# Patient Record
Sex: FEMALE | Race: WHITE | Employment: OTHER | URBAN - METROPOLITAN AREA
[De-identification: names, ages, dates, MRNs, and addresses within clinical notes are randomized per-mention and may not be internally consistent; named-entity substitution may affect disease eponyms.]

---

## 2017-03-31 ENCOUNTER — OFFICE VISIT (OUTPATIENT)
Dept: ORTHOPEDIC SURGERY | Age: 82
End: 2017-03-31

## 2017-03-31 VITALS — BODY MASS INDEX: 42.39 KG/M2 | HEIGHT: 67 IN | WEIGHT: 270.06 LBS

## 2017-03-31 DIAGNOSIS — M17.0 PRIMARY OSTEOARTHRITIS OF BOTH KNEES: Primary | ICD-10-CM

## 2017-03-31 PROCEDURE — 99213 OFFICE O/P EST LOW 20 MIN: CPT | Performed by: ORTHOPAEDIC SURGERY

## 2017-03-31 PROCEDURE — G8399 PT W/DXA RESULTS DOCUMENT: HCPCS | Performed by: ORTHOPAEDIC SURGERY

## 2017-03-31 PROCEDURE — 4040F PNEUMOC VAC/ADMIN/RCVD: CPT | Performed by: ORTHOPAEDIC SURGERY

## 2017-03-31 PROCEDURE — 1090F PRES/ABSN URINE INCON ASSESS: CPT | Performed by: ORTHOPAEDIC SURGERY

## 2017-03-31 PROCEDURE — G8427 DOCREV CUR MEDS BY ELIG CLIN: HCPCS | Performed by: ORTHOPAEDIC SURGERY

## 2017-03-31 PROCEDURE — 20611 DRAIN/INJ JOINT/BURSA W/US: CPT | Performed by: ORTHOPAEDIC SURGERY

## 2017-03-31 PROCEDURE — G8417 CALC BMI ABV UP PARAM F/U: HCPCS | Performed by: ORTHOPAEDIC SURGERY

## 2017-03-31 PROCEDURE — 1123F ACP DISCUSS/DSCN MKR DOCD: CPT | Performed by: ORTHOPAEDIC SURGERY

## 2017-03-31 PROCEDURE — G8484 FLU IMMUNIZE NO ADMIN: HCPCS | Performed by: ORTHOPAEDIC SURGERY

## 2017-03-31 PROCEDURE — 1036F TOBACCO NON-USER: CPT | Performed by: ORTHOPAEDIC SURGERY

## 2017-05-25 ENCOUNTER — TELEPHONE (OUTPATIENT)
Dept: FAMILY MEDICINE CLINIC | Age: 82
End: 2017-05-25

## 2017-06-21 ENCOUNTER — OFFICE VISIT (OUTPATIENT)
Dept: ORTHOPEDIC SURGERY | Age: 82
End: 2017-06-21

## 2017-06-21 VITALS
BODY MASS INDEX: 26.68 KG/M2 | SYSTOLIC BLOOD PRESSURE: 168 MMHG | HEART RATE: 72 BPM | DIASTOLIC BLOOD PRESSURE: 93 MMHG | HEIGHT: 67 IN | WEIGHT: 170 LBS

## 2017-06-21 DIAGNOSIS — M17.0 PRIMARY OSTEOARTHRITIS OF BOTH KNEES: Primary | ICD-10-CM

## 2017-06-21 PROCEDURE — 99214 OFFICE O/P EST MOD 30 MIN: CPT | Performed by: ORTHOPAEDIC SURGERY

## 2017-06-21 PROCEDURE — 1036F TOBACCO NON-USER: CPT | Performed by: ORTHOPAEDIC SURGERY

## 2017-06-21 PROCEDURE — 20611 DRAIN/INJ JOINT/BURSA W/US: CPT | Performed by: ORTHOPAEDIC SURGERY

## 2017-06-21 PROCEDURE — G8417 CALC BMI ABV UP PARAM F/U: HCPCS | Performed by: ORTHOPAEDIC SURGERY

## 2017-06-21 PROCEDURE — 4040F PNEUMOC VAC/ADMIN/RCVD: CPT | Performed by: ORTHOPAEDIC SURGERY

## 2017-06-21 PROCEDURE — 1123F ACP DISCUSS/DSCN MKR DOCD: CPT | Performed by: ORTHOPAEDIC SURGERY

## 2017-06-21 PROCEDURE — G8399 PT W/DXA RESULTS DOCUMENT: HCPCS | Performed by: ORTHOPAEDIC SURGERY

## 2017-06-21 PROCEDURE — 1090F PRES/ABSN URINE INCON ASSESS: CPT | Performed by: ORTHOPAEDIC SURGERY

## 2017-06-21 PROCEDURE — G8427 DOCREV CUR MEDS BY ELIG CLIN: HCPCS | Performed by: ORTHOPAEDIC SURGERY

## 2017-06-28 ENCOUNTER — OFFICE VISIT (OUTPATIENT)
Dept: ORTHOPEDIC SURGERY | Age: 82
End: 2017-06-28

## 2017-06-28 DIAGNOSIS — M17.0 PRIMARY OSTEOARTHRITIS OF BOTH KNEES: Primary | ICD-10-CM

## 2017-06-28 PROCEDURE — 20611 DRAIN/INJ JOINT/BURSA W/US: CPT | Performed by: PHYSICIAN ASSISTANT

## 2017-06-28 PROCEDURE — 1036F TOBACCO NON-USER: CPT | Performed by: PHYSICIAN ASSISTANT

## 2017-06-28 PROCEDURE — 99999 PR OFFICE/OUTPT VISIT,PROCEDURE ONLY: CPT | Performed by: PHYSICIAN ASSISTANT

## 2017-07-05 ENCOUNTER — OFFICE VISIT (OUTPATIENT)
Dept: ORTHOPEDIC SURGERY | Age: 82
End: 2017-07-05

## 2017-07-05 DIAGNOSIS — M17.0 OSTEOARTHRITIS OF BOTH KNEES, UNSPECIFIED OSTEOARTHRITIS TYPE: Primary | ICD-10-CM

## 2017-07-05 PROCEDURE — 20611 DRAIN/INJ JOINT/BURSA W/US: CPT | Performed by: ORTHOPAEDIC SURGERY

## 2017-10-17 DIAGNOSIS — E55.9 VITAMIN D DEFICIENCY: ICD-10-CM

## 2017-10-18 ENCOUNTER — OFFICE VISIT (OUTPATIENT)
Dept: FAMILY MEDICINE CLINIC | Age: 82
End: 2017-10-18

## 2017-10-18 VITALS
BODY MASS INDEX: 23.49 KG/M2 | OXYGEN SATURATION: 93 % | SYSTOLIC BLOOD PRESSURE: 134 MMHG | HEART RATE: 85 BPM | HEIGHT: 65 IN | WEIGHT: 141 LBS | DIASTOLIC BLOOD PRESSURE: 83 MMHG

## 2017-10-18 DIAGNOSIS — Z83.49: ICD-10-CM

## 2017-10-18 DIAGNOSIS — Z91.81 AT HIGH RISK FOR FALLS: ICD-10-CM

## 2017-10-18 DIAGNOSIS — I10 ESSENTIAL HYPERTENSION: Primary | ICD-10-CM

## 2017-10-18 DIAGNOSIS — M17.0 OSTEOARTHRITIS OF BOTH KNEES, UNSPECIFIED OSTEOARTHRITIS TYPE: ICD-10-CM

## 2017-10-18 DIAGNOSIS — J45.20 MILD INTERMITTENT ASTHMA WITHOUT COMPLICATION: ICD-10-CM

## 2017-10-18 DIAGNOSIS — Z23 NEED FOR STREPTOCOCCUS PNEUMONIAE AND INFLUENZA VACCINATION: ICD-10-CM

## 2017-10-18 DIAGNOSIS — F32.A DEPRESSION, UNSPECIFIED DEPRESSION TYPE: ICD-10-CM

## 2017-10-18 DIAGNOSIS — E55.9 VITAMIN D DEFICIENCY: ICD-10-CM

## 2017-10-18 LAB
A/G RATIO: 1.6 (ref 1.1–2.2)
ALBUMIN SERPL-MCNC: 4.1 G/DL (ref 3.4–5)
ALP BLD-CCNC: 101 U/L (ref 40–129)
ALT SERPL-CCNC: 15 U/L (ref 10–40)
ANION GAP SERPL CALCULATED.3IONS-SCNC: 13 MMOL/L (ref 3–16)
AST SERPL-CCNC: 20 U/L (ref 15–37)
BILIRUB SERPL-MCNC: 0.3 MG/DL (ref 0–1)
BUN BLDV-MCNC: 15 MG/DL (ref 7–20)
CALCIUM SERPL-MCNC: 9.9 MG/DL (ref 8.3–10.6)
CHLORIDE BLD-SCNC: 101 MMOL/L (ref 99–110)
CO2: 29 MMOL/L (ref 21–32)
CREAT SERPL-MCNC: 0.7 MG/DL (ref 0.6–1.2)
GFR AFRICAN AMERICAN: >60
GFR NON-AFRICAN AMERICAN: >60
GLOBULIN: 2.6 G/DL
GLUCOSE BLD-MCNC: 145 MG/DL (ref 70–99)
POTASSIUM SERPL-SCNC: 3.9 MMOL/L (ref 3.5–5.1)
SODIUM BLD-SCNC: 143 MMOL/L (ref 136–145)
TOTAL PROTEIN: 6.7 G/DL (ref 6.4–8.2)
VITAMIN D 25-HYDROXY: 41.4 NG/ML

## 2017-10-18 PROCEDURE — 90670 PCV13 VACCINE IM: CPT | Performed by: FAMILY MEDICINE

## 2017-10-18 PROCEDURE — 4040F PNEUMOC VAC/ADMIN/RCVD: CPT | Performed by: FAMILY MEDICINE

## 2017-10-18 PROCEDURE — G8484 FLU IMMUNIZE NO ADMIN: HCPCS | Performed by: FAMILY MEDICINE

## 2017-10-18 PROCEDURE — 1123F ACP DISCUSS/DSCN MKR DOCD: CPT | Performed by: FAMILY MEDICINE

## 2017-10-18 PROCEDURE — 1036F TOBACCO NON-USER: CPT | Performed by: FAMILY MEDICINE

## 2017-10-18 PROCEDURE — G8399 PT W/DXA RESULTS DOCUMENT: HCPCS | Performed by: FAMILY MEDICINE

## 2017-10-18 PROCEDURE — 1090F PRES/ABSN URINE INCON ASSESS: CPT | Performed by: FAMILY MEDICINE

## 2017-10-18 PROCEDURE — 99214 OFFICE O/P EST MOD 30 MIN: CPT | Performed by: FAMILY MEDICINE

## 2017-10-18 PROCEDURE — G0009 ADMIN PNEUMOCOCCAL VACCINE: HCPCS | Performed by: FAMILY MEDICINE

## 2017-10-18 PROCEDURE — G8420 CALC BMI NORM PARAMETERS: HCPCS | Performed by: FAMILY MEDICINE

## 2017-10-18 PROCEDURE — G8427 DOCREV CUR MEDS BY ELIG CLIN: HCPCS | Performed by: FAMILY MEDICINE

## 2017-10-18 RX ORDER — CALCIUM CARBONATE/VITAMIN D3 600 MG-20
TABLET ORAL
Qty: 30 CAPSULE | Refills: 1 | Status: SHIPPED | OUTPATIENT
Start: 2017-10-18 | End: 2017-10-18 | Stop reason: SDUPTHER

## 2017-10-18 RX ORDER — LISINOPRIL 40 MG/1
40 TABLET ORAL DAILY
Qty: 90 TABLET | Refills: 3 | Status: SHIPPED | OUTPATIENT
Start: 2017-10-18 | End: 2019-11-14 | Stop reason: ALTCHOICE

## 2017-10-18 RX ORDER — FLUTICASONE PROPIONATE 110 UG/1
AEROSOL, METERED RESPIRATORY (INHALATION)
Qty: 1 INHALER | Refills: 5 | Status: SHIPPED | OUTPATIENT
Start: 2017-10-18 | End: 2020-03-04

## 2017-10-18 RX ORDER — AMLODIPINE BESYLATE 10 MG/1
10 TABLET ORAL DAILY
Qty: 90 TABLET | Refills: 3 | Status: SHIPPED | OUTPATIENT
Start: 2017-10-18 | End: 2018-04-25 | Stop reason: ALTCHOICE

## 2017-10-18 ASSESSMENT — PATIENT HEALTH QUESTIONNAIRE - PHQ9
2. FEELING DOWN, DEPRESSED OR HOPELESS: 0
1. LITTLE INTEREST OR PLEASURE IN DOING THINGS: 0
SUM OF ALL RESPONSES TO PHQ9 QUESTIONS 1 & 2: 0
SUM OF ALL RESPONSES TO PHQ QUESTIONS 1-9: 0

## 2017-10-18 ASSESSMENT — ENCOUNTER SYMPTOMS: SHORTNESS OF BREATH: 0

## 2017-10-18 NOTE — PROGRESS NOTES
Chacho Mendiola is a 80 y.o. female    Chief Complaint   Patient presents with    Hypertension    Asthma    Knee Pain    Other     vitamin D deficiency       HPI:    Hypertension   This is a chronic problem. The current episode started more than 1 year ago. The problem has been gradually worsening since onset. The problem is uncontrolled. Pertinent negatives include no chest pain or shortness of breath. Risk factors for coronary artery disease include post-menopausal state. Past treatments include calcium channel blockers and ACE inhibitors. The current treatment provides moderate improvement. Mild intermittent asthma. This is a chronic condition. She has not used her albuterol at all this month. She has no current wheezing. She is compliant with her Flovent. Osteoarthritis. This is a chronic condition. Patient is scheduled her next knee Supartz injection in 2 months. The injections help moderately. Patient takes aspirin for her pain which helps. No bleeding. The Meloxicam does not help much. She would be interested in trying physical therapy again. Vitamin D deficiency. She takes her supplement daily. She would like to have her levels rechecked again    History of Vitamin B12 deficiency. Patient had a normal level in 2014 and 2016 despite not taking the supplement. ROS:    Review of Systems   Respiratory: Negative for shortness of breath. Cardiovascular: Negative for chest pain. /83   Pulse 85   Ht 5' 5\" (1.651 m)   Wt 141 lb (64 kg)   SpO2 93%   Breastfeeding? No   BMI 23.46 kg/m²     Physical Exam:    Physical Exam   Constitutional: No distress. HENT:   Head: Normocephalic. Cardiovascular: Normal rate and regular rhythm. No murmur heard. Pulmonary/Chest: Effort normal and breath sounds normal.   Neurological: She is alert. Skin: She is not diaphoretic. Psychiatric: She has a normal mood and affect.        Current Outpatient Prescriptions   Medication Sig Dispense Refill    lisinopril (PRINIVIL;ZESTRIL) 40 MG tablet Take 1 tablet by mouth daily 90 tablet 3    amLODIPine (NORVASC) 10 MG tablet Take 1 tablet by mouth daily 90 tablet 3    Cholecalciferol (CVS D3) 2000 units CAPS TAKE ONE CAPSULE BY MOUTH DAILY 90 capsule 3    fluticasone (FLOVENT HFA) 110 MCG/ACT inhaler INHALE 1 PUFF INTO THE LUNGS 2 TIMES DAILY. 1 Inhaler 5    meloxicam (MOBIC) 15 MG tablet Take 0.5 tablets by mouth daily 30 tablet 0    aspirin 325 MG EC tablet Take 325 mg by mouth daily      albuterol (PROVENTIL HFA;VENTOLIN HFA) 108 (90 BASE) MCG/ACT inhaler Inhale 2 puffs into the lungs every 6 hours as needed. 1 Inhaler 2    acetaminophen (TYLENOL) 325 MG tablet Take 650 mg by mouth every 6 hours as needed. OTC       No current facility-administered medications for this visit. Assessment:    1. Essential hypertension    2. Mild intermittent asthma without complication    3. Osteoarthritis of both knees, unspecified osteoarthritis type    4. Vitamin D deficiency    5. Need for Streptococcus pneumoniae and influenza vaccination    6. At high risk for falls    7. Family history of non-anemic vitamin B12 deficiency        Plan:    1. Essential hypertension  Stable. Continue current medications. Recheck was normal.   - Comprehensive Metabolic Panel  - lisinopril (PRINIVIL;ZESTRIL) 40 MG tablet; Take 1 tablet by mouth daily  Dispense: 90 tablet; Refill: 3  - amLODIPine (NORVASC) 10 MG tablet; Take 1 tablet by mouth daily  Dispense: 90 tablet; Refill: 3    2. Mild intermittent asthma without complication  Stable. Continue current medications. - fluticasone (FLOVENT HFA) 110 MCG/ACT inhaler; INHALE 1 PUFF INTO THE LUNGS 2 TIMES DAILY. Dispense: 1 Inhaler; Refill: 5    3. Osteoarthritis of both knees, unspecified osteoarthritis type  Continue seeing Ortho. Go to [facility]    4. Vitamin D deficiency  Recheck.    - Vitamin D 25 Hydroxy  - Cholecalciferol (CVS D3) 2000

## 2017-11-01 ENCOUNTER — OFFICE VISIT (OUTPATIENT)
Dept: ORTHOPEDIC SURGERY | Age: 82
End: 2017-11-01

## 2017-11-01 VITALS — WEIGHT: 141.09 LBS | BODY MASS INDEX: 23.51 KG/M2 | HEIGHT: 65 IN

## 2017-11-01 DIAGNOSIS — M17.0 PRIMARY OSTEOARTHRITIS OF BOTH KNEES: Primary | ICD-10-CM

## 2017-11-01 PROCEDURE — G8427 DOCREV CUR MEDS BY ELIG CLIN: HCPCS | Performed by: PHYSICIAN ASSISTANT

## 2017-11-01 PROCEDURE — 1123F ACP DISCUSS/DSCN MKR DOCD: CPT | Performed by: PHYSICIAN ASSISTANT

## 2017-11-01 PROCEDURE — G8399 PT W/DXA RESULTS DOCUMENT: HCPCS | Performed by: PHYSICIAN ASSISTANT

## 2017-11-01 PROCEDURE — G8420 CALC BMI NORM PARAMETERS: HCPCS | Performed by: PHYSICIAN ASSISTANT

## 2017-11-01 PROCEDURE — 1090F PRES/ABSN URINE INCON ASSESS: CPT | Performed by: PHYSICIAN ASSISTANT

## 2017-11-01 PROCEDURE — 1036F TOBACCO NON-USER: CPT | Performed by: PHYSICIAN ASSISTANT

## 2017-11-01 PROCEDURE — G8484 FLU IMMUNIZE NO ADMIN: HCPCS | Performed by: PHYSICIAN ASSISTANT

## 2017-11-01 PROCEDURE — 99213 OFFICE O/P EST LOW 20 MIN: CPT | Performed by: PHYSICIAN ASSISTANT

## 2017-11-01 PROCEDURE — 4040F PNEUMOC VAC/ADMIN/RCVD: CPT | Performed by: PHYSICIAN ASSISTANT

## 2017-11-01 PROCEDURE — 20611 DRAIN/INJ JOINT/BURSA W/US: CPT | Performed by: PHYSICIAN ASSISTANT

## 2017-11-01 RX ORDER — MELOXICAM 15 MG/1
7.5 TABLET ORAL DAILY
Qty: 30 TABLET | Refills: 0 | Status: SHIPPED | OUTPATIENT
Start: 2017-11-01 | End: 2018-01-19

## 2017-11-01 NOTE — PROGRESS NOTES
(6.0-15.0 MHz) linear transducer was used to localize the placement of a 22-gauge needle into the knee joint. Findings: Successful needle placement for knee injection. Final images were taken and saved for permanent record. The patient tolerated the injection well. T`he patient was instructed to call the office immediately if there is any pain, redness, warmth, fever, or chills.

## 2017-12-11 ENCOUNTER — HOSPITAL ENCOUNTER (OUTPATIENT)
Dept: PHYSICAL THERAPY | Age: 82
Discharge: HOME | End: 2017-12-31
Admitting: FAMILY MEDICINE

## 2017-12-11 NOTE — PROGRESS NOTES
be indep in HEP  Short term goal 2: pt will decrease pain 25%  Short term goal 3: pt will increase B hip strength to 4/5  Short term goal 4: pt will increase TKE by 5 degress BLE  Short term goal 5: pt will ambulate with increased rosa and improved stride length       Therapy Time   Individual Concurrent Group Co-treatment   Time In 1105         Time Out 1200         Minutes 55         Timed Code Treatment Minutes: [id number] [facility] [facility], [address] [place] [address] [address]

## 2017-12-20 ENCOUNTER — HOSPITAL ENCOUNTER (OUTPATIENT)
Dept: PHYSICAL THERAPY | Age: 82
Discharge: HOME | End: 2017-12-20
Admitting: FAMILY MEDICINE

## 2017-12-20 NOTE — FLOWSHEET NOTE
treatment well [] Patient limited by fatique  [] Patient limited by pain [] Patient limited by other medical complications  [] Other:     Goals:    Short term goals  Time Frame for Short term goals: 4 weeks  Short term goal 1: pt will be indep in HEP  Short term goal 2: pt will decrease pain 25%  Short term goal 3: pt will increase B hip strength to 4/5  Short term goal 4: pt will increase TKE by 5 degress BLE  Short term goal 5: pt will ambulate with increased rosa and improved stride length           Plan: [x] Continue per plan of care [] Alter current plan (see comments)   [] Plan of care initiated [] Hold pending MD visit [] Discharge      Plan for Next Session:  Biomechanical correction of problems as they present. Facilitate correct muscle firing patterns and activation with appropriate activities. Progress patient as tolerated.      Re-Certification Due Date:    1/11/18     Signature:  Ana Nguyen PT

## 2018-01-01 ENCOUNTER — HOSPITAL ENCOUNTER (OUTPATIENT)
Dept: OTHER | Age: 83
Discharge: HOME | End: 2018-01-31
Attending: FAMILY MEDICINE | Admitting: FAMILY MEDICINE

## 2018-01-19 PROBLEM — R77.8 ELEVATED TROPONIN: Status: ACTIVE | Noted: 2018-01-19

## 2018-01-19 PROBLEM — R79.89 ELEVATED TROPONIN: Status: ACTIVE | Noted: 2018-01-19

## 2018-01-19 PROBLEM — J20.9 ACUTE BRONCHITIS: Status: ACTIVE | Noted: 2018-01-19

## 2018-02-01 ENCOUNTER — HOSPITAL ENCOUNTER (OUTPATIENT)
Dept: OTHER | Age: 83
Discharge: HOME | End: 2018-02-28
Attending: FAMILY MEDICINE | Admitting: FAMILY MEDICINE

## 2018-02-15 ENCOUNTER — OFFICE VISIT (OUTPATIENT)
Dept: ORTHOPEDIC SURGERY | Age: 83
End: 2018-02-15

## 2018-02-15 VITALS
WEIGHT: 134.26 LBS | HEIGHT: 65 IN | BODY MASS INDEX: 22.37 KG/M2 | SYSTOLIC BLOOD PRESSURE: 121 MMHG | HEART RATE: 183 BPM | DIASTOLIC BLOOD PRESSURE: 64 MMHG

## 2018-02-15 DIAGNOSIS — M17.11 PRIMARY OSTEOARTHRITIS OF RIGHT KNEE: Primary | ICD-10-CM

## 2018-02-15 DIAGNOSIS — M25.561 PAIN IN BOTH KNEES, UNSPECIFIED CHRONICITY: ICD-10-CM

## 2018-02-15 DIAGNOSIS — M25.562 PAIN IN BOTH KNEES, UNSPECIFIED CHRONICITY: ICD-10-CM

## 2018-02-15 PROCEDURE — 1123F ACP DISCUSS/DSCN MKR DOCD: CPT | Performed by: ORTHOPAEDIC SURGERY

## 2018-02-15 PROCEDURE — G8484 FLU IMMUNIZE NO ADMIN: HCPCS | Performed by: ORTHOPAEDIC SURGERY

## 2018-02-15 PROCEDURE — 4040F PNEUMOC VAC/ADMIN/RCVD: CPT | Performed by: ORTHOPAEDIC SURGERY

## 2018-02-15 PROCEDURE — 1036F TOBACCO NON-USER: CPT | Performed by: ORTHOPAEDIC SURGERY

## 2018-02-15 PROCEDURE — G8427 DOCREV CUR MEDS BY ELIG CLIN: HCPCS | Performed by: ORTHOPAEDIC SURGERY

## 2018-02-15 PROCEDURE — G8420 CALC BMI NORM PARAMETERS: HCPCS | Performed by: ORTHOPAEDIC SURGERY

## 2018-02-15 PROCEDURE — 1090F PRES/ABSN URINE INCON ASSESS: CPT | Performed by: ORTHOPAEDIC SURGERY

## 2018-02-15 PROCEDURE — L1812 KO ELASTIC W/JOINTS PRE OTS: HCPCS | Performed by: ORTHOPAEDIC SURGERY

## 2018-02-15 PROCEDURE — G8399 PT W/DXA RESULTS DOCUMENT: HCPCS | Performed by: ORTHOPAEDIC SURGERY

## 2018-02-15 PROCEDURE — 99213 OFFICE O/P EST LOW 20 MIN: CPT | Performed by: ORTHOPAEDIC SURGERY

## 2018-02-15 RX ORDER — MELOXICAM 15 MG/1
15 TABLET ORAL DAILY
Qty: 30 TABLET | Refills: 1 | Status: SHIPPED | OUTPATIENT
Start: 2018-02-15 | End: 2018-04-12

## 2018-02-15 NOTE — PROGRESS NOTES
for Pain, Disp: , Rfl:     lisinopril (PRINIVIL;ZESTRIL) 40 MG tablet, Take 1 tablet by mouth daily (Patient taking differently: Take 20 mg by mouth daily ), Disp: 90 tablet, Rfl: 3    amLODIPine (NORVASC) 10 MG tablet, Take 1 tablet by mouth daily (Patient taking differently: Take 10 mg by mouth daily ), Disp: 90 tablet, Rfl: 3    Cholecalciferol (CVS D3) 2000 units CAPS, TAKE ONE CAPSULE BY MOUTH DAILY, Disp: 90 capsule, Rfl: 3    fluticasone (FLOVENT HFA) 110 MCG/ACT inhaler, INHALE 1 PUFF INTO THE LUNGS 2 TIMES DAILY. (Patient taking differently: 2 times daily as needed INHALE 1 PUFF INTO THE LUNGS 2 TIMES DAILY.), Disp: 1 Inhaler, Rfl: 5    aspirin 325 MG EC tablet, Take 325 mg by mouth daily, Disp: , Rfl:     acetaminophen (TYLENOL) 325 MG tablet, Take 650 mg by mouth every 6 hours as needed. OTC, Disp: , Rfl:   Allergies:  Hydrochlorothiazide  Social History:    reports that she has never smoked. She has never used smokeless tobacco. She reports that she does not drink alcohol or use drugs. Family History:   Family History   Problem Relation Age of Onset    Heart Disease Father      MI       REVIEW OF SYSTEMS:   For new problems, a full review of systems will be found scanned in the patient's chart. CONSTITUTIONAL: Denies unexplained weight loss, fevers, chills   NEUROLOGICAL: Denies unsteady gait or progressive weakness  SKIN: Denies skin changes, delayed healing, rash, itching       PHYSICAL EXAM:    Vitals: Blood pressure 121/64, pulse 183, height 5' 4.96\" (1.65 m), weight 134 lb 4.2 oz (60.9 kg), not currently breastfeeding. GENERAL EXAM:  · General Apparence: Patient is adequately groomed with no evidence of malnutrition. · Orientation: The patient is oriented to time, place and person.    · Mood & Affect:The patient's mood and affect are appropriate       Bilateral knees PHYSICAL EXAMINATION:  · Inspection:  Visible valgus deformities to each knee    · Palpation:  Tenderness to palpation diffusely along the lateral aspect of each knee      · Range of Motion: range of motion is limited from proximally 5-110° bilaterally    · Strength: no gross lower extremity weakness    · Special Tests:  I am able to correct her valgus deformity on exam.  No ligamentous laxity is noted. Negative Homans testing bilaterally. · Skin:  There are no rashes, ulcerations or lesions. · Gait & station: antalgic with a walker      · Additional Examinations:        Lower Back: Examination of the lower back does not show any tenderness, deformity or injury. Range of motion is unremarkable. There is no gross instability. There are no rashes, ulcerations or lesions. Strength and tone are normal.      Diagnostic Testing:      Views:  5   Location:  Bilateral knees. One AP view including both knees. 2 additional views right knee. 2 additional views LEFT knee. Findings:  X-rays taken today reveal severe, end-stage osteoarthritis of both knees with bone-on-bone findings of the lateral compartment    Orders     Orders Placed This Encounter   Procedures    Knee Bilateral Standard         Assessment / Treatment Plan:     1. Bilateral knee osteoarthritis, severe    I discussed with the patient the nature of osteoarthritis of the knee. We talked about treatment of arthritis and the various options that are involved with this. The patient understands that the treatments can vary from essentially doing nothing to a total joint replacement arthroplasty for arthritis. I then went on to describe the utilization of glucosamine and chondroitin sulfate as a joint nutrition product. We talked about the fact that this is essentially a joint vitamin with typically minimal side effects. We also talked about utilization of prescription over-the-counter anti-inflammatory medications as the next option. We also discussed the possibility of brace wear or orthotic wear if the patient has significant varus alignment.  We then went on to discuss the possibility of Visco supplementation with hyaluronate products. We talked about the typical course of this type of treatment and the fact that often times in the treatment for significant arthritis, this is successful less than half the time. We also talked about the corticosteroid injections and the fact that this can give a brief window of relief, but does not cure the problem; in fact, the pain often has a rebound effect in 6-10 weeks after the steroid has worn off. We also discussed arthroscopy surgery in attempts to debride the joint, but the fact that this is relatively unreliable treatment in the face of significant arthritis. It can occasionally be used, particularly if there is significant meniscus pathology. Lastly we discussed total joint replacement arthroplasty as the final and definitive step in treatment of arthritis. Patient realizes the magnitude of this type of treatment as well as having voiced a general understanding to the duration of the prosthesis. The patient voiced understanding to these continuum of treatment options. I explained to the patient that I was the only answer for her disabling arthritis as a joint replacement probably really question whether she could medically get cleared. The severe question on essentially for the primary care physician. I have personally performed and/or participated in the history, exam and medical decision making and agree with all pertinent clinical information. I have also reviewed and agree with the past medical, family and social history unless otherwise noted. This dictation was performed with a verbal recognition program (DRAGON) and it was checked for errors. It is possible that there are still dictated errors within this office note. If so, please bring any errors to my attention for an addendum. All efforts were made to ensure that this office note is accurate.           Mark Parr MD

## 2018-03-01 ENCOUNTER — HOSPITAL ENCOUNTER (OUTPATIENT)
Dept: OTHER | Age: 83
Discharge: HOME | End: 2018-03-31
Attending: FAMILY MEDICINE | Admitting: FAMILY MEDICINE

## 2018-04-02 ENCOUNTER — HOSPITAL ENCOUNTER (OUTPATIENT)
Dept: OTHER | Age: 83
Discharge: HOME | End: 2018-04-02
Attending: INTERNAL MEDICINE | Admitting: INTERNAL MEDICINE

## 2018-04-02 LAB
ANION GAP SERPL CALCULATED.3IONS-SCNC: 14 MMOL/L (ref 3–16)
BUN BLDV-MCNC: 28 MG/DL (ref 7–20)
CALCIUM SERPL-MCNC: 9.5 MG/DL (ref 8.3–10.6)
CHLORIDE BLD-SCNC: 102 MMOL/L (ref 99–110)
CO2: 28 MMOL/L (ref 21–32)
CREAT SERPL-MCNC: 0.7 MG/DL (ref 0.6–1.2)
GFR AFRICAN AMERICAN: >60
GFR NON-AFRICAN AMERICAN: >60
GLUCOSE BLD-MCNC: 84 MG/DL (ref 70–99)
POTASSIUM SERPL-SCNC: 3.8 MMOL/L (ref 3.5–5.1)
SODIUM BLD-SCNC: 144 MMOL/L (ref 136–145)

## 2018-04-10 ENCOUNTER — HOSPITAL ENCOUNTER (OUTPATIENT)
Dept: CT IMAGING | Age: 83
Discharge: HOME | End: 2018-04-10
Attending: INTERNAL MEDICINE | Admitting: INTERNAL MEDICINE

## 2018-04-10 DIAGNOSIS — K59.00 CONSTIPATION, UNSPECIFIED CONSTIPATION TYPE: ICD-10-CM

## 2018-04-10 DIAGNOSIS — K59.00 CONSTIPATION: ICD-10-CM

## 2018-04-11 PROBLEM — R77.8 ELEVATED TROPONIN: Status: RESOLVED | Noted: 2018-01-19 | Resolved: 2018-04-11

## 2018-04-11 PROBLEM — R79.89 ELEVATED TROPONIN: Status: RESOLVED | Noted: 2018-01-19 | Resolved: 2018-04-11

## 2018-04-12 ENCOUNTER — OFFICE VISIT (OUTPATIENT)
Dept: INTERNAL MEDICINE CLINIC | Age: 83
End: 2018-04-12

## 2018-04-12 VITALS
WEIGHT: 131 LBS | BODY MASS INDEX: 21.83 KG/M2 | DIASTOLIC BLOOD PRESSURE: 80 MMHG | HEIGHT: 65 IN | HEART RATE: 73 BPM | SYSTOLIC BLOOD PRESSURE: 152 MMHG | OXYGEN SATURATION: 97 % | RESPIRATION RATE: 18 BRPM

## 2018-04-12 DIAGNOSIS — N20.0 STAGHORN CALCULUS: ICD-10-CM

## 2018-04-12 DIAGNOSIS — I10 ESSENTIAL HYPERTENSION: ICD-10-CM

## 2018-04-12 DIAGNOSIS — Z00.00 ROUTINE GENERAL MEDICAL EXAMINATION AT A HEALTH CARE FACILITY: Primary | ICD-10-CM

## 2018-04-12 DIAGNOSIS — M17.0 PRIMARY OSTEOARTHRITIS OF BOTH KNEES: ICD-10-CM

## 2018-04-12 DIAGNOSIS — Z71.89 ACP (ADVANCE CARE PLANNING): ICD-10-CM

## 2018-04-12 DIAGNOSIS — M75.111 INCOMPLETE TEAR OF RIGHT ROTATOR CUFF: ICD-10-CM

## 2018-04-12 DIAGNOSIS — E04.9 GOITER: ICD-10-CM

## 2018-04-12 DIAGNOSIS — J45.20 MILD INTERMITTENT ASTHMA WITHOUT COMPLICATION: ICD-10-CM

## 2018-04-12 DIAGNOSIS — E53.8 B12 DEFICIENCY: ICD-10-CM

## 2018-04-12 DIAGNOSIS — Z23 NEED FOR PROPHYLACTIC VACCINATION AND INOCULATION AGAINST VARICELLA: ICD-10-CM

## 2018-04-12 DIAGNOSIS — H61.21 IMPACTED CERUMEN OF RIGHT EAR: ICD-10-CM

## 2018-04-12 DIAGNOSIS — E55.9 VITAMIN D DEFICIENCY: ICD-10-CM

## 2018-04-12 DIAGNOSIS — Z91.199 HISTORY OF NONADHERENCE TO MEDICAL TREATMENT: ICD-10-CM

## 2018-04-12 DIAGNOSIS — G56.02 CARPAL TUNNEL SYNDROME OF LEFT WRIST: ICD-10-CM

## 2018-04-12 PROBLEM — J20.9 ACUTE BRONCHITIS: Status: RESOLVED | Noted: 2018-01-19 | Resolved: 2018-04-12

## 2018-04-12 PROCEDURE — 1090F PRES/ABSN URINE INCON ASSESS: CPT | Performed by: INTERNAL MEDICINE

## 2018-04-12 PROCEDURE — 1123F ACP DISCUSS/DSCN MKR DOCD: CPT | Performed by: INTERNAL MEDICINE

## 2018-04-12 PROCEDURE — G8399 PT W/DXA RESULTS DOCUMENT: HCPCS | Performed by: INTERNAL MEDICINE

## 2018-04-12 PROCEDURE — G8427 DOCREV CUR MEDS BY ELIG CLIN: HCPCS | Performed by: INTERNAL MEDICINE

## 2018-04-12 PROCEDURE — 4040F PNEUMOC VAC/ADMIN/RCVD: CPT | Performed by: INTERNAL MEDICINE

## 2018-04-12 PROCEDURE — 99214 OFFICE O/P EST MOD 30 MIN: CPT | Performed by: INTERNAL MEDICINE

## 2018-04-12 PROCEDURE — G0439 PPPS, SUBSEQ VISIT: HCPCS | Performed by: INTERNAL MEDICINE

## 2018-04-12 PROCEDURE — 1036F TOBACCO NON-USER: CPT | Performed by: INTERNAL MEDICINE

## 2018-04-12 PROCEDURE — G8420 CALC BMI NORM PARAMETERS: HCPCS | Performed by: INTERNAL MEDICINE

## 2018-04-12 ASSESSMENT — ANXIETY QUESTIONNAIRES: GAD7 TOTAL SCORE: 0

## 2018-04-12 ASSESSMENT — LIFESTYLE VARIABLES: HOW OFTEN DO YOU HAVE A DRINK CONTAINING ALCOHOL: 0

## 2018-04-25 ENCOUNTER — OFFICE VISIT (OUTPATIENT)
Dept: INTERNAL MEDICINE CLINIC | Age: 83
End: 2018-04-25

## 2018-04-25 VITALS
BODY MASS INDEX: 22.88 KG/M2 | DIASTOLIC BLOOD PRESSURE: 80 MMHG | SYSTOLIC BLOOD PRESSURE: 130 MMHG | WEIGHT: 134 LBS | HEIGHT: 64 IN | RESPIRATION RATE: 16 BRPM | OXYGEN SATURATION: 99 % | HEART RATE: 66 BPM

## 2018-04-25 DIAGNOSIS — I10 ESSENTIAL HYPERTENSION: Primary | ICD-10-CM

## 2018-04-25 PROCEDURE — 1036F TOBACCO NON-USER: CPT | Performed by: INTERNAL MEDICINE

## 2018-04-25 PROCEDURE — 1090F PRES/ABSN URINE INCON ASSESS: CPT | Performed by: INTERNAL MEDICINE

## 2018-04-25 PROCEDURE — 99213 OFFICE O/P EST LOW 20 MIN: CPT | Performed by: INTERNAL MEDICINE

## 2018-04-25 PROCEDURE — G8399 PT W/DXA RESULTS DOCUMENT: HCPCS | Performed by: INTERNAL MEDICINE

## 2018-04-25 PROCEDURE — G8420 CALC BMI NORM PARAMETERS: HCPCS | Performed by: INTERNAL MEDICINE

## 2018-04-25 PROCEDURE — 1123F ACP DISCUSS/DSCN MKR DOCD: CPT | Performed by: INTERNAL MEDICINE

## 2018-04-25 PROCEDURE — G8427 DOCREV CUR MEDS BY ELIG CLIN: HCPCS | Performed by: INTERNAL MEDICINE

## 2018-04-25 PROCEDURE — 4040F PNEUMOC VAC/ADMIN/RCVD: CPT | Performed by: INTERNAL MEDICINE

## 2018-05-14 ENCOUNTER — TELEPHONE (OUTPATIENT)
Dept: INTERNAL MEDICINE CLINIC | Age: 83
End: 2018-05-14

## 2018-07-19 ENCOUNTER — TELEPHONE (OUTPATIENT)
Dept: INTERNAL MEDICINE CLINIC | Age: 83
End: 2018-07-19

## 2018-07-19 DIAGNOSIS — M17.0 PRIMARY OSTEOARTHRITIS OF BOTH KNEES: Primary | ICD-10-CM

## 2018-07-24 ENCOUNTER — TELEPHONE (OUTPATIENT)
Dept: INTERNAL MEDICINE CLINIC | Age: 83
End: 2018-07-24

## 2018-07-24 NOTE — TELEPHONE ENCOUNTER
I did fax this last week and received confirmation that it was successful so I shredded it. Please re-write so I can send it again and scan into pt's chart.

## 2018-07-24 NOTE — TELEPHONE ENCOUNTER
Patient states they did not receive her walker prescription. I do not see any confirmation notes or document scanned in for original faxing.        please fax again to     [phone]

## 2018-07-26 ENCOUNTER — TELEPHONE (OUTPATIENT)
Dept: INTERNAL MEDICINE CLINIC | Age: 83
End: 2018-07-26

## 2018-08-29 ENCOUNTER — OFFICE VISIT (OUTPATIENT)
Dept: INTERNAL MEDICINE CLINIC | Age: 83
End: 2018-08-29

## 2018-08-29 VITALS
OXYGEN SATURATION: 97 % | HEART RATE: 92 BPM | SYSTOLIC BLOOD PRESSURE: 130 MMHG | DIASTOLIC BLOOD PRESSURE: 94 MMHG | BODY MASS INDEX: 19.91 KG/M2 | WEIGHT: 116 LBS

## 2018-08-29 DIAGNOSIS — I10 ESSENTIAL HYPERTENSION: Primary | ICD-10-CM

## 2018-08-29 DIAGNOSIS — K59.04 CHRONIC IDIOPATHIC CONSTIPATION: ICD-10-CM

## 2018-08-29 DIAGNOSIS — M75.111 INCOMPLETE TEAR OF RIGHT ROTATOR CUFF: ICD-10-CM

## 2018-08-29 DIAGNOSIS — M17.0 PRIMARY OSTEOARTHRITIS OF BOTH KNEES: ICD-10-CM

## 2018-08-29 PROCEDURE — G8427 DOCREV CUR MEDS BY ELIG CLIN: HCPCS | Performed by: INTERNAL MEDICINE

## 2018-08-29 PROCEDURE — 99214 OFFICE O/P EST MOD 30 MIN: CPT | Performed by: INTERNAL MEDICINE

## 2018-08-29 PROCEDURE — 4040F PNEUMOC VAC/ADMIN/RCVD: CPT | Performed by: INTERNAL MEDICINE

## 2018-08-29 PROCEDURE — 1036F TOBACCO NON-USER: CPT | Performed by: INTERNAL MEDICINE

## 2018-08-29 PROCEDURE — 1123F ACP DISCUSS/DSCN MKR DOCD: CPT | Performed by: INTERNAL MEDICINE

## 2018-08-29 PROCEDURE — G8399 PT W/DXA RESULTS DOCUMENT: HCPCS | Performed by: INTERNAL MEDICINE

## 2018-08-29 PROCEDURE — 1090F PRES/ABSN URINE INCON ASSESS: CPT | Performed by: INTERNAL MEDICINE

## 2018-08-29 PROCEDURE — G8420 CALC BMI NORM PARAMETERS: HCPCS | Performed by: INTERNAL MEDICINE

## 2018-08-29 PROCEDURE — 1101F PT FALLS ASSESS-DOCD LE1/YR: CPT | Performed by: INTERNAL MEDICINE

## 2018-08-29 RX ORDER — AMLODIPINE BESYLATE 10 MG/1
5 TABLET ORAL DAILY
Qty: 1 TABLET | Refills: 0 | COMMUNITY
Start: 2018-08-29 | End: 2018-09-08

## 2018-08-29 RX ORDER — MELOXICAM 15 MG/1
15 TABLET ORAL DAILY
Qty: 1 TABLET | Refills: 0 | COMMUNITY
Start: 2018-08-29 | End: 2018-09-05 | Stop reason: SDUPTHER

## 2018-08-29 NOTE — PROGRESS NOTES
Severa Paschal  YOB: 1935    Date of Service:  8/29/2018    Chief Complaint:      Chief Complaint   Patient presents with    Hypertension     Patient says her BP has been \"crazy\". HPI:  Severa Paschal is a 80 y.o. She also needs a letter to excuse her from jury duty due to arthritis and difficulty getting around with her rolling walker. Hypertension:  Home blood pressure monitoring: Yes - 130/80 on Lisinopril 40 mg 1/2 qd and amlodipine 10 mg 1/2 qd.  She is adherent to a low sodium diet. Patient denies chest pain, shortness of breath, headache, lightheadedness, blurred vision, peripheral edema, palpitations, dry cough and fatigue.  Antihypertensive medication side effects: no medication side effects noted.  Use of agents associated with hypertension: none.    Right rotator cuff tear and bilateral knee OA:  Still in pain on Mobic 15 mg 1/2 qd with Tyl prn and  mg qd.   Chronic Constipation:  Continue Milk of Mag prn    Lab Results   Component Value Date    LABMICR YES 01/19/2018     Lab Results   Component Value Date     04/02/2018    K 3.8 04/02/2018     04/02/2018    CO2 28 04/02/2018    BUN 28 (H) 04/02/2018    CREATININE 0.7 04/02/2018    GLUCOSE 84 04/02/2018    CALCIUM 9.5 04/02/2018     Lab Results   Component Value Date    CHOL 150 01/20/2018    TRIG 56 01/20/2018    HDL 47 01/20/2018    LDLCALC 92 01/20/2018     Lab Results   Component Value Date    ALT 9 (L) 01/19/2018    AST 13 (L) 01/19/2018     Lab Results   Component Value Date    TSH 0.57 05/19/2015    TSH 1.18 10/06/2011    T4FREE 1.05 10/06/2011    T4FREE 1.05 10/06/2011     Lab Results   Component Value Date    WBC 4.1 01/19/2018    HGB 12.9 01/19/2018    HCT 39.5 01/19/2018    MCV 89.8 01/19/2018     01/19/2018     Lab Results   Component Value Date    INR 1.10 10/31/2016      No results found for: PSA   No results found for: [facility]     Patient Active Problem List   Diagnosis    Carpal tunnel syndrome of left wrist    Mild intermittent asthma    Essential hypertension    Goiter    Vitamin D deficiency    Staghorn calculus    Biceps rupture, proximal    Incomplete tear of right rotator cuff    History of nonadherence to medical treatment    B12 deficiency    Primary osteoarthritis of both knees       Allergies   Allergen Reactions    Hydrochlorothiazide      Constipation and severe leg cramps     Outpatient Prescriptions Marked as Taking for the 8/29/18 encounter (Office Visit) with Lara Sinha MD   Medication Sig Dispense Refill    Misc. Devices (ROLLER WALKER) MISC 1 each by Does not apply route daily 1 each 0    lisinopril (PRINIVIL;ZESTRIL) 40 MG tablet Take 1 tablet by mouth daily (Patient taking differently: Take 20 mg by mouth daily ) 90 tablet 3    Cholecalciferol (CVS D3) 2000 units CAPS TAKE ONE CAPSULE BY MOUTH DAILY 90 capsule 3    fluticasone (FLOVENT HFA) 110 MCG/ACT inhaler INHALE 1 PUFF INTO THE LUNGS 2 TIMES DAILY. (Patient taking differently: 2 times daily as needed INHALE 1 PUFF INTO THE LUNGS 2 TIMES DAILY. ) 1 Inhaler 5    aspirin 325 MG EC tablet Take 325 mg by mouth daily      acetaminophen (TYLENOL) 325 MG tablet Take 650 mg by mouth every 6 hours as needed. OTC           Review of Systems: 14 systems were negative except of what was stated on HPI    Nursing note and vitals reviewed. Vitals:    08/29/18 1337   BP: (!) 130/94   Site: Left Arm   Position: Sitting   Cuff Size: Medium Adult   Pulse: 92   SpO2: 97%   Weight: 116 lb (52.6 kg)     Wt Readings from Last 3 Encounters:   08/29/18 116 lb (52.6 kg)   04/25/18 134 lb (60.8 kg)   04/12/18 131 lb (59.4 kg)     BP Readings from Last 3 Encounters:   08/29/18 (!) 130/94   04/25/18 130/80   04/12/18 (!) 152/80     Body mass index is 19.91 kg/m². Constitutional: Patient appears well-developed and well-nourished. No distress. Head: Normocephalic and atraumatic. Neck: Normal range of motion. Neck supple.

## 2018-09-05 ENCOUNTER — OFFICE VISIT (OUTPATIENT)
Dept: ORTHOPEDIC SURGERY | Age: 83
End: 2018-09-05

## 2018-09-05 VITALS — BODY MASS INDEX: 22.88 KG/M2 | HEIGHT: 64 IN | WEIGHT: 134 LBS

## 2018-09-05 DIAGNOSIS — M17.0 PRIMARY OSTEOARTHRITIS OF BOTH KNEES: Primary | ICD-10-CM

## 2018-09-05 PROCEDURE — G8399 PT W/DXA RESULTS DOCUMENT: HCPCS | Performed by: ORTHOPAEDIC SURGERY

## 2018-09-05 PROCEDURE — 1036F TOBACCO NON-USER: CPT | Performed by: ORTHOPAEDIC SURGERY

## 2018-09-05 PROCEDURE — 4040F PNEUMOC VAC/ADMIN/RCVD: CPT | Performed by: ORTHOPAEDIC SURGERY

## 2018-09-05 PROCEDURE — 99213 OFFICE O/P EST LOW 20 MIN: CPT | Performed by: ORTHOPAEDIC SURGERY

## 2018-09-05 PROCEDURE — 1123F ACP DISCUSS/DSCN MKR DOCD: CPT | Performed by: ORTHOPAEDIC SURGERY

## 2018-09-05 PROCEDURE — 1090F PRES/ABSN URINE INCON ASSESS: CPT | Performed by: ORTHOPAEDIC SURGERY

## 2018-09-05 PROCEDURE — 20611 DRAIN/INJ JOINT/BURSA W/US: CPT | Performed by: ORTHOPAEDIC SURGERY

## 2018-09-05 PROCEDURE — G8427 DOCREV CUR MEDS BY ELIG CLIN: HCPCS | Performed by: ORTHOPAEDIC SURGERY

## 2018-09-05 PROCEDURE — G8420 CALC BMI NORM PARAMETERS: HCPCS | Performed by: ORTHOPAEDIC SURGERY

## 2018-09-05 PROCEDURE — 1101F PT FALLS ASSESS-DOCD LE1/YR: CPT | Performed by: ORTHOPAEDIC SURGERY

## 2018-09-05 RX ORDER — MELOXICAM 15 MG/1
15 TABLET ORAL DAILY
Qty: 90 TABLET | Refills: 0 | Status: SHIPPED | OUTPATIENT
Start: 2018-09-05 | End: 2019-01-02 | Stop reason: SDUPTHER

## 2018-09-05 NOTE — PROGRESS NOTES
(60.8 kg), not currently breastfeeding. GENERAL EXAM:  · General Apparence: Patient is adequately groomed with no evidence of malnutrition. · Orientation: The patient is oriented to time, place and person. · Mood & Affect:The patient's mood and affect are appropriate       Bilateral knees PHYSICAL EXAMINATION:  · Inspection:  Moderate valgus alignment bilateral limbus effusions bilateral    · Palpation:  Tender diffusely over both knees. · Range of Motion: 5120 degreesbilateral    · Strength: No gross motor weaknessbilateral    · Special Tests:  No ligamentous instabilitybilateral.  The MCL is intactbilateral            · Skin:  There are no rashes, ulcerations or lesions. · There are mild distal dysvascular changes     Gait & station: Ambulatory aid      Additional Examinations:        Negative straight leg raise bilateral.  Negative log roll examination bilateral.      Diagnostic Testing:          Orders   No orders of the defined types were placed in this encounter. Assessment / Treatment Plan:     1. End-stage osteoarthritis bilateral knees in this nonsurgical candidate. We discussed treatment options and she is decided to go ahead and receive another course of steroids. 2.  LEFT and right knee injected today. I discussed in detail the risks, benefits and complications of an injection which included but are not limited to infection, skin reactions, hot swollen joint, and anaphylaxis with the patient. The patient verbalized understanding and gave informed consent for the injection. The patient's knee was flexed to 90° and the skin prepped using sterile alcohol solution. A sterile 22-gauge needle was inserted into the knee and the mixture of 4 mL of 2% Carbocaine, 4 mL of 0.25% Marcaine, and 2mL of 40 mg of Depo-Medrol was injected under sterile technique. The needle was withdrawn and the puncture site sealed with a Band-Aid.      Technique: Under sterile conditions a [employer] ultrasound unit with a variable frequency (6.0-15.0 MHz) linear transducer was used to localize the placement of a 22-gauge needle into the knee joint. Findings: Successful needle placement for knee injection. Final images were taken and saved for permanent record. The patient tolerated the injection well. The patient was instructed to call the office immediately if there is any pain, redness, warmth, fever, or chills. 3.  Follow-up in 3 months p.r.n.    I have personally performed and/or participated in the history, exam and medical decision making and agree with all pertinent clinical information. I have also reviewed and agree with the past medical, family and social history unless otherwise noted. This dictation was performed with a verbal recognition program (DRAGON) and it was checked for errors. It is possible that there are still dictated errors within this office note. If so, please bring any errors to my attention for an addendum. All efforts were made to ensure that this office note is accurate.           Brittney Islas MD

## 2018-09-08 ENCOUNTER — APPOINTMENT (OUTPATIENT)
Dept: GENERAL RADIOLOGY | Age: 83
End: 2018-09-08
Payer: MEDICARE

## 2018-09-08 ENCOUNTER — HOSPITAL ENCOUNTER (EMERGENCY)
Age: 83
Discharge: HOME | End: 2018-09-08
Payer: MEDICARE

## 2018-09-08 VITALS
SYSTOLIC BLOOD PRESSURE: 143 MMHG | BODY MASS INDEX: 17.19 KG/M2 | HEART RATE: 69 BPM | HEIGHT: 67 IN | TEMPERATURE: 97.9 F | DIASTOLIC BLOOD PRESSURE: 94 MMHG | RESPIRATION RATE: 18 BRPM | OXYGEN SATURATION: 97 % | WEIGHT: 109.5 LBS

## 2018-09-08 DIAGNOSIS — R53.83 FATIGUE, UNSPECIFIED TYPE: Primary | ICD-10-CM

## 2018-09-08 DIAGNOSIS — E86.0 DEHYDRATION: ICD-10-CM

## 2018-09-08 DIAGNOSIS — R63.4 WEIGHT LOSS: ICD-10-CM

## 2018-09-08 LAB
A/G RATIO: 1.7 (ref 1.1–2.2)
ALBUMIN SERPL-MCNC: 4.2 G/DL (ref 3.4–5)
ALP BLD-CCNC: 85 U/L (ref 40–129)
ALT SERPL-CCNC: 13 U/L (ref 10–40)
ANION GAP SERPL CALCULATED.3IONS-SCNC: 11 MMOL/L (ref 3–16)
AST SERPL-CCNC: 16 U/L (ref 15–37)
BACTERIA: ABNORMAL /HPF
BASOPHILS ABSOLUTE: 0 K/UL (ref 0–0.2)
BASOPHILS RELATIVE PERCENT: 0.5 %
BILIRUB SERPL-MCNC: 0.3 MG/DL (ref 0–1)
BILIRUBIN URINE: NEGATIVE
BLOOD, URINE: ABNORMAL
BUN BLDV-MCNC: 24 MG/DL (ref 7–20)
CALCIUM SERPL-MCNC: 9.4 MG/DL (ref 8.3–10.6)
CHLORIDE BLD-SCNC: 98 MMOL/L (ref 99–110)
CLARITY: ABNORMAL
CO2: 29 MMOL/L (ref 21–32)
COLOR: YELLOW
CREAT SERPL-MCNC: 0.7 MG/DL (ref 0.6–1.2)
EOSINOPHILS ABSOLUTE: 0 K/UL (ref 0–0.6)
EOSINOPHILS RELATIVE PERCENT: 0.3 %
EPITHELIAL CELLS, UA: ABNORMAL /HPF
GFR AFRICAN AMERICAN: >60
GFR NON-AFRICAN AMERICAN: >60
GLOBULIN: 2.5 G/DL
GLUCOSE BLD-MCNC: 193 MG/DL (ref 70–99)
GLUCOSE URINE: NEGATIVE MG/DL
HCT VFR BLD CALC: 40.9 % (ref 36–48)
HEMOGLOBIN: 13.5 G/DL (ref 12–16)
KETONES, URINE: NEGATIVE MG/DL
LEUKOCYTE ESTERASE, URINE: ABNORMAL
LIPASE: 36 U/L (ref 13–60)
LYMPHOCYTES ABSOLUTE: 0.9 K/UL (ref 1–5.1)
LYMPHOCYTES RELATIVE PERCENT: 15.7 %
MCH RBC QN AUTO: 29.7 PG (ref 26–34)
MCHC RBC AUTO-ENTMCNC: 32.9 G/DL (ref 31–36)
MCV RBC AUTO: 90 FL (ref 80–100)
MICROSCOPIC EXAMINATION: YES
MONOCYTES ABSOLUTE: 0.4 K/UL (ref 0–1.3)
MONOCYTES RELATIVE PERCENT: 6.5 %
MUCUS: ABNORMAL /LPF
NEUTROPHILS ABSOLUTE: 4.5 K/UL (ref 1.7–7.7)
NEUTROPHILS RELATIVE PERCENT: 77 %
NITRITE, URINE: NEGATIVE
PDW BLD-RTO: 14.1 % (ref 12.4–15.4)
PH UA: 6
PLATELET # BLD: 181 K/UL (ref 135–450)
PMV BLD AUTO: 9.7 FL (ref 5–10.5)
POTASSIUM SERPL-SCNC: 3.9 MMOL/L (ref 3.5–5.1)
PROTEIN UA: NEGATIVE MG/DL
RBC # BLD: 4.54 M/UL (ref 4–5.2)
RBC UA: ABNORMAL /HPF (ref 0–2)
SODIUM BLD-SCNC: 138 MMOL/L (ref 136–145)
SPECIFIC GRAVITY UA: 1.02
TOTAL PROTEIN: 6.7 G/DL (ref 6.4–8.2)
TROPONIN: 0.02 NG/ML
URINE REFLEX TO CULTURE: YES
URINE TYPE: ABNORMAL
UROBILINOGEN, URINE: 0.2 E.U./DL
WBC # BLD: 5.8 K/UL (ref 4–11)
WBC UA: ABNORMAL /HPF (ref 0–5)

## 2018-09-08 PROCEDURE — 93005 ELECTROCARDIOGRAM TRACING: CPT | Performed by: NURSE PRACTITIONER

## 2018-09-08 PROCEDURE — 85025 COMPLETE CBC W/AUTO DIFF WBC: CPT

## 2018-09-08 PROCEDURE — 96360 HYDRATION IV INFUSION INIT: CPT

## 2018-09-08 PROCEDURE — 81001 URINALYSIS AUTO W/SCOPE: CPT

## 2018-09-08 PROCEDURE — 93010 ELECTROCARDIOGRAM REPORT: CPT | Performed by: INTERNAL MEDICINE

## 2018-09-08 PROCEDURE — 83690 ASSAY OF LIPASE: CPT

## 2018-09-08 PROCEDURE — 80053 COMPREHEN METABOLIC PANEL: CPT

## 2018-09-08 PROCEDURE — 2580000003 HC RX 258: Performed by: NURSE PRACTITIONER

## 2018-09-08 PROCEDURE — 99284 EMERGENCY DEPT VISIT MOD MDM: CPT

## 2018-09-08 PROCEDURE — 71046 X-RAY EXAM CHEST 2 VIEWS: CPT

## 2018-09-08 PROCEDURE — 87086 URINE CULTURE/COLONY COUNT: CPT

## 2018-09-08 PROCEDURE — 84484 ASSAY OF TROPONIN QUANT: CPT

## 2018-09-08 RX ORDER — 0.9 % SODIUM CHLORIDE 0.9 %
500 INTRAVENOUS SOLUTION INTRAVENOUS ONCE
Status: COMPLETED | OUTPATIENT
Start: 2018-09-08 | End: 2018-09-08

## 2018-09-08 RX ADMIN — SODIUM CHLORIDE 500 ML: 9 INJECTION, SOLUTION INTRAVENOUS at 12:30

## 2018-09-08 ASSESSMENT — ENCOUNTER SYMPTOMS
SHORTNESS OF BREATH: 0
ABDOMINAL PAIN: 0

## 2018-09-08 NOTE — ED PROVIDER NOTES
Evaluated by [id number] [facility] Provider          [facility] [address] [facility] ED  eMERGENCY dEPARTMENT eNCOUnter        Pt Name: Blu Gomez  MRN: [id number]  [place] 1935  Date of evaluation: 9/8/2018  Provider: Prabhu Johnson, APRN - CNP  PCP: Enma Webber MD  ED Attending: No att. providers found    [facility]       Chief Complaint   Patient presents with    Dehydration     Pt presents with c/o dehydration. Pt states she has been week for 1 month but this morning it is worse. Denies chest pain. States she had steroid injection on Wednesday in Knees. States she normally feels bad after these injections. Denies n/v/f.  Fatigue       HISTORY OF PRESENT ILLNESS   (Location/Symptom, Timing/Onset, Context/Setting, Quality, Duration, Modifying Factors, Severity)  Note limiting factors. Blu Gomez is a 80 y.o. female for concerns for dehydration. Onset was the last few weeks. Duration has been since the onset. Context includes pt states she is having generalized weakness and has lost 59 pounds in the last 6 months. Patient is concerned that she is dehydrated. Patient denies any chest pain or shortness of breath. Patient reports that she is eating okay and having increased urine output but recently had a cortisone injection for her knees. Alleviating factors include nothing. Aggravating factors include nothing. Pain is 0/10. Nothing has been used for pain today. Nursing Notes were all reviewed and agreed with or any disagreements were addressed  in the HPI. REVIEW OF SYSTEMS    (2-9 systems for level 4, 10 or more for level 5)     Review of Systems   Constitutional: Positive for fatigue and unexpected weight change. Negative for fever. Respiratory: Negative for shortness of breath. Cardiovascular: Negative for chest pain. Gastrointestinal: Negative for abdominal pain. Genitourinary: Negative for difficulty urinating. Neurological: Positive for weakness.    All other systems reviewed and are negative. Positives and Pertinent negatives as per HPI. Except as noted above in the ROS, all other systems were reviewed and negative. PAST MEDICAL HISTORY     Past Medical History:   Diagnosis Date    Anxiety 7/20/2012    Arthritis     Asthma     Carpal tunnel syndrome of left wrist 10/6/2011    Depression 5/2/2012    GERD (gastroesophageal reflux disease) 9/4/2013    Hematuria     chronic from stone    History of nonadherence to medical treatment 10/11/2014    Hypertension     Kidney stone     saw Dr Jerome - 6 cm stone    Thyroid disease     goiter    Wears glasses     for reading         SURGICAL HISTORY       Past Surgical History:   Procedure Laterality Date    CARPAL TUNNEL RELEASE Right 09/14/2011    OPEN REDUCTION INTERNAL FIXATION RIGHT WRIST (RUSSO'S FRACTURE)    HYSTERECTOMY           CURRENT MEDICATIONS       Previous Medications    ASPIRIN 325 MG EC TABLET    Take 325 mg by mouth every 4 hours as needed     CHOLECALCIFEROL (CVS D3) 2000 UNITS CAPS    TAKE ONE CAPSULE BY MOUTH DAILY    FLUTICASONE (FLOVENT HFA) 110 MCG/ACT INHALER    INHALE 1 PUFF INTO THE LUNGS 2 TIMES DAILY. LISINOPRIL (PRINIVIL;ZESTRIL) 40 MG TABLET    Take 1 tablet by mouth daily    MAGNESIUM HYDROXIDE (MILK OF MAGNESIA) 400 MG/5ML SUSPENSION    Take 30 mLs by mouth daily as needed for Constipation    MELOXICAM (MOBIC) 15 MG TABLET    Take 1 tablet by mouth daily    MISC. DEVICES (ROLLER WALKER) MISC    1 each by Does not apply route daily         ALLERGIES     Hydrochlorothiazide    FAMILY HISTORY       Family History   Problem Relation Age of Onset    Other Mother         scleroderma    Heart Disease Father         MI    Heart Attack Son 43    Diabetes Son           SOCIAL HISTORY       Social History     Social History    Marital status:       Spouse name: N/A    Number of children: N/A    Years of education: N/A     Social History Main Topics    Smoking

## 2018-09-08 NOTE — ED TRIAGE NOTES
Chief Complaint   Patient presents with    Dehydration     Pt presents with c/o dehydration. Pt states she has been week for 1 month but this morning it is worse. Denies chest pain. States she had steroid injection on Wednesday in Knees. States she normally feels bad after these injections. Denies n/v/f.     Fatigue

## 2018-09-08 NOTE — ED NOTES
Pt up to bedside with standby assistance. Urine sample obtained and sent to lab.       Alfredito Magdaleno RN  09/08/18 [id number]

## 2018-09-09 LAB — URINE CULTURE, ROUTINE: NORMAL

## 2018-09-11 LAB
EKG ATRIAL RATE: 72 BPM
EKG DIAGNOSIS: NORMAL
EKG P AXIS: 46 DEGREES
EKG P-R INTERVAL: 158 MS
EKG Q-T INTERVAL: 382 MS
EKG QRS DURATION: 104 MS
EKG QTC CALCULATION (BAZETT): 418 MS
EKG R AXIS: -63 DEGREES
EKG T AXIS: 46 DEGREES
EKG VENTRICULAR RATE: 72 BPM

## 2018-10-18 ENCOUNTER — TELEPHONE (OUTPATIENT)
Dept: INTERNAL MEDICINE CLINIC | Age: 83
End: 2018-10-18

## 2018-10-18 NOTE — TELEPHONE ENCOUNTER
Home Care Nurse called & just give FYI    Patient had a fall & did land/injure left elbow. There was a very small cut that did bleed a little, nurse went to look at it & it was very minor & it was healing.

## 2019-01-02 ENCOUNTER — OFFICE VISIT (OUTPATIENT)
Dept: INTERNAL MEDICINE CLINIC | Age: 84
End: 2019-01-02
Payer: MEDICARE

## 2019-01-02 VITALS
DIASTOLIC BLOOD PRESSURE: 88 MMHG | HEART RATE: 60 BPM | SYSTOLIC BLOOD PRESSURE: 138 MMHG | WEIGHT: 121 LBS | OXYGEN SATURATION: 93 % | BODY MASS INDEX: 18.95 KG/M2

## 2019-01-02 DIAGNOSIS — E55.9 VITAMIN D DEFICIENCY: ICD-10-CM

## 2019-01-02 DIAGNOSIS — I10 ESSENTIAL HYPERTENSION: Primary | ICD-10-CM

## 2019-01-02 DIAGNOSIS — H61.22 IMPACTED CERUMEN OF LEFT EAR: ICD-10-CM

## 2019-01-02 DIAGNOSIS — K59.04 CHRONIC IDIOPATHIC CONSTIPATION: ICD-10-CM

## 2019-01-02 DIAGNOSIS — M17.0 PRIMARY OSTEOARTHRITIS OF BOTH KNEES: ICD-10-CM

## 2019-01-02 DIAGNOSIS — E53.8 B12 DEFICIENCY: ICD-10-CM

## 2019-01-02 PROCEDURE — G8420 CALC BMI NORM PARAMETERS: HCPCS | Performed by: INTERNAL MEDICINE

## 2019-01-02 PROCEDURE — G8399 PT W/DXA RESULTS DOCUMENT: HCPCS | Performed by: INTERNAL MEDICINE

## 2019-01-02 PROCEDURE — G8484 FLU IMMUNIZE NO ADMIN: HCPCS | Performed by: INTERNAL MEDICINE

## 2019-01-02 PROCEDURE — 69209 REMOVE IMPACTED EAR WAX UNI: CPT | Performed by: INTERNAL MEDICINE

## 2019-01-02 PROCEDURE — 1036F TOBACCO NON-USER: CPT | Performed by: INTERNAL MEDICINE

## 2019-01-02 PROCEDURE — 1090F PRES/ABSN URINE INCON ASSESS: CPT | Performed by: INTERNAL MEDICINE

## 2019-01-02 PROCEDURE — 1101F PT FALLS ASSESS-DOCD LE1/YR: CPT | Performed by: INTERNAL MEDICINE

## 2019-01-02 PROCEDURE — 4040F PNEUMOC VAC/ADMIN/RCVD: CPT | Performed by: INTERNAL MEDICINE

## 2019-01-02 PROCEDURE — 99214 OFFICE O/P EST MOD 30 MIN: CPT | Performed by: INTERNAL MEDICINE

## 2019-01-02 PROCEDURE — 1123F ACP DISCUSS/DSCN MKR DOCD: CPT | Performed by: INTERNAL MEDICINE

## 2019-01-02 PROCEDURE — G8427 DOCREV CUR MEDS BY ELIG CLIN: HCPCS | Performed by: INTERNAL MEDICINE

## 2019-01-02 RX ORDER — DOCUSATE SODIUM 100 MG/1
100 CAPSULE, LIQUID FILLED ORAL DAILY PRN
Qty: 30 CAPSULE | Refills: 2 | Status: SHIPPED | OUTPATIENT
Start: 2019-01-02 | End: 2019-04-03 | Stop reason: ALTCHOICE

## 2019-01-02 RX ORDER — MELOXICAM 15 MG/1
15 TABLET ORAL DAILY
Qty: 90 TABLET | Refills: 0 | Status: SHIPPED | OUTPATIENT
Start: 2019-01-02 | End: 2019-07-25

## 2019-01-02 RX ORDER — CYANOCOBALAMIN (VITAMIN B-12) 1000 MCG
1000 TABLET ORAL DAILY
Qty: 30 TABLET | Refills: 2 | Status: SHIPPED | OUTPATIENT
Start: 2019-01-02 | End: 2019-07-25

## 2019-02-04 ENCOUNTER — OFFICE VISIT (OUTPATIENT)
Dept: ORTHOPEDIC SURGERY | Age: 84
End: 2019-02-04
Payer: MEDICARE

## 2019-02-04 VITALS — HEIGHT: 67 IN | BODY MASS INDEX: 19 KG/M2 | WEIGHT: 121.03 LBS

## 2019-02-04 DIAGNOSIS — M17.0 BILATERAL PRIMARY OSTEOARTHRITIS OF KNEE: Primary | ICD-10-CM

## 2019-02-04 PROCEDURE — 1090F PRES/ABSN URINE INCON ASSESS: CPT | Performed by: ORTHOPAEDIC SURGERY

## 2019-02-04 PROCEDURE — 1036F TOBACCO NON-USER: CPT | Performed by: ORTHOPAEDIC SURGERY

## 2019-02-04 PROCEDURE — 1123F ACP DISCUSS/DSCN MKR DOCD: CPT | Performed by: ORTHOPAEDIC SURGERY

## 2019-02-04 PROCEDURE — 99214 OFFICE O/P EST MOD 30 MIN: CPT | Performed by: ORTHOPAEDIC SURGERY

## 2019-02-04 PROCEDURE — G8484 FLU IMMUNIZE NO ADMIN: HCPCS | Performed by: ORTHOPAEDIC SURGERY

## 2019-02-04 PROCEDURE — 4040F PNEUMOC VAC/ADMIN/RCVD: CPT | Performed by: ORTHOPAEDIC SURGERY

## 2019-02-04 PROCEDURE — G8427 DOCREV CUR MEDS BY ELIG CLIN: HCPCS | Performed by: ORTHOPAEDIC SURGERY

## 2019-02-04 PROCEDURE — 1101F PT FALLS ASSESS-DOCD LE1/YR: CPT | Performed by: ORTHOPAEDIC SURGERY

## 2019-02-04 PROCEDURE — G8399 PT W/DXA RESULTS DOCUMENT: HCPCS | Performed by: ORTHOPAEDIC SURGERY

## 2019-02-04 PROCEDURE — G8420 CALC BMI NORM PARAMETERS: HCPCS | Performed by: ORTHOPAEDIC SURGERY

## 2019-04-03 ENCOUNTER — OFFICE VISIT (OUTPATIENT)
Dept: INTERNAL MEDICINE CLINIC | Age: 84
End: 2019-04-03
Payer: MEDICARE

## 2019-04-03 VITALS
DIASTOLIC BLOOD PRESSURE: 78 MMHG | OXYGEN SATURATION: 98 % | SYSTOLIC BLOOD PRESSURE: 132 MMHG | BODY MASS INDEX: 18.32 KG/M2 | WEIGHT: 117 LBS | HEART RATE: 81 BPM

## 2019-04-03 DIAGNOSIS — K58.1 IRRITABLE BOWEL SYNDROME WITH CONSTIPATION: Primary | ICD-10-CM

## 2019-04-03 DIAGNOSIS — J30.89 ENVIRONMENTAL AND SEASONAL ALLERGIES: ICD-10-CM

## 2019-04-03 DIAGNOSIS — I10 ESSENTIAL HYPERTENSION: ICD-10-CM

## 2019-04-03 DIAGNOSIS — M17.0 PRIMARY OSTEOARTHRITIS OF BOTH KNEES: ICD-10-CM

## 2019-04-03 PROCEDURE — G8399 PT W/DXA RESULTS DOCUMENT: HCPCS | Performed by: INTERNAL MEDICINE

## 2019-04-03 PROCEDURE — 1036F TOBACCO NON-USER: CPT | Performed by: INTERNAL MEDICINE

## 2019-04-03 PROCEDURE — 4040F PNEUMOC VAC/ADMIN/RCVD: CPT | Performed by: INTERNAL MEDICINE

## 2019-04-03 PROCEDURE — G8427 DOCREV CUR MEDS BY ELIG CLIN: HCPCS | Performed by: INTERNAL MEDICINE

## 2019-04-03 PROCEDURE — 1090F PRES/ABSN URINE INCON ASSESS: CPT | Performed by: INTERNAL MEDICINE

## 2019-04-03 PROCEDURE — G8419 CALC BMI OUT NRM PARAM NOF/U: HCPCS | Performed by: INTERNAL MEDICINE

## 2019-04-03 PROCEDURE — 99214 OFFICE O/P EST MOD 30 MIN: CPT | Performed by: INTERNAL MEDICINE

## 2019-04-03 PROCEDURE — 1123F ACP DISCUSS/DSCN MKR DOCD: CPT | Performed by: INTERNAL MEDICINE

## 2019-04-03 RX ORDER — AMLODIPINE BESYLATE 5 MG/1
5 TABLET ORAL DAILY
COMMUNITY
End: 2019-05-30

## 2019-04-03 ASSESSMENT — PATIENT HEALTH QUESTIONNAIRE - PHQ9
SUM OF ALL RESPONSES TO PHQ9 QUESTIONS 1 & 2: 0
SUM OF ALL RESPONSES TO PHQ QUESTIONS 1-9: 0
2. FEELING DOWN, DEPRESSED OR HOPELESS: 0
1. LITTLE INTEREST OR PLEASURE IN DOING THINGS: 0
SUM OF ALL RESPONSES TO PHQ QUESTIONS 1-9: 0

## 2019-04-03 NOTE — PROGRESS NOTES
Vanessa Garduno  YOB: 1935    Date of Service:  4/3/2019    Chief Complaint:      Chief Complaint   Patient presents with    Hypertension       HPI:  Vanessa Garduno is a 80 y.o. Hypertension:  Home blood pressure monitoring: Yes - 130/80 on Lisinopril 40 mg 1/2 qd and amlodipine 10 mg 1/2 qd.  She is adherent to a low sodium diet. Patient denies chest pain, shortness of breath, headache, lightheadedness, blurred vision, peripheral edema, palpitations, dry cough and fatigue.  Antihypertensive medication side effects: no medication side effects noted.  Use of agents associated with hypertension: none.    Right rotator cuff tear and bilateral knee OA:  Still in pain since been off Mobic 15 mg 1/2 qd due to fear of affecting her kidneys and upseting her stomach since she doesn't eat much. Chronic Constipation:  not like taste of Milk of Mag so only been using it q6 days and Colace didn't work.     Lab Results   Component Value Date    LABMICR YES 09/08/2018     Lab Results   Component Value Date     09/08/2018    K 3.9 09/08/2018    CL 98 (L) 09/08/2018    CO2 29 09/08/2018    BUN 24 (H) 09/08/2018    CREATININE 0.7 09/08/2018    GLUCOSE 193 (H) 09/08/2018    CALCIUM 9.4 09/08/2018     Lab Results   Component Value Date    CHOL 150 01/20/2018    TRIG 56 01/20/2018    HDL 47 01/20/2018    LDLCALC 92 01/20/2018     Lab Results   Component Value Date    ALT 13 09/08/2018    AST 16 09/08/2018     Lab Results   Component Value Date    TSH 0.57 05/19/2015    TSH 1.18 10/06/2011    T4FREE 1.05 10/06/2011    T4FREE 1.05 10/06/2011     Lab Results   Component Value Date    WBC 5.8 09/08/2018    HGB 13.5 09/08/2018    HCT 40.9 09/08/2018    MCV 90.0 09/08/2018     09/08/2018     Lab Results   Component Value Date    INR 1.10 10/31/2016      No results found for: PSA   No results found for: [facility]     Patient Active Problem List   Diagnosis    Carpal tunnel syndrome of left wrist    Mild intermittent asthma    Essential hypertension    Goiter    Vitamin D deficiency    Staghorn calculus    Biceps rupture, proximal    Incomplete tear of right rotator cuff    History of nonadherence to medical treatment    B12 deficiency    Primary osteoarthritis of both knees    Chronic idiopathic constipation       Allergies   Allergen Reactions    Hydrochlorothiazide      Constipation and severe leg cramps     Outpatient Medications Marked as Taking for the 4/3/19 encounter (Office Visit) with Nina Harris MD   Medication Sig Dispense Refill    amLODIPine (NORVASC) 5 MG tablet Take 5 mg by mouth daily      Cholecalciferol ([employer] D3) 2000 units CAPS TAKE ONE CAPSULE BY MOUTH DAILY 90 capsule 3    magnesium hydroxide (MILK OF MAGNESIA) 400 MG/5ML suspension Take 30 mLs by mouth daily as needed for Constipation 1 Bottle 0    Misc. Devices (ROLLER WALKER) MISC 1 each by Does not apply route daily 1 each 0    lisinopril (PRINIVIL;ZESTRIL) 40 MG tablet Take 1 tablet by mouth daily (Patient taking differently: Take 20 mg by mouth daily Patient states she only takes 10 mg) 90 tablet 3    fluticasone (FLOVENT HFA) 110 MCG/ACT inhaler INHALE 1 PUFF INTO THE LUNGS 2 TIMES DAILY. (Patient taking differently: 2 times daily as needed INHALE 1 PUFF INTO THE LUNGS 2 TIMES DAILY. ) 1 Inhaler 5    aspirin 325 MG EC tablet Take 325 mg by mouth every 4 hours as needed            Review of Systems: 14 systems were negative except of what was stated on HPI    Nursing note and vitals reviewed. Vitals:    04/03/19 1301   BP: 132/78   Pulse: 81   SpO2: 98%   Weight: 117 lb (53.1 kg)     Wt Readings from Last 3 Encounters:   04/03/19 117 lb (53.1 kg)   02/04/19 121 lb 0.5 oz (54.9 kg)   01/02/19 121 lb (54.9 kg)     BP Readings from Last 3 Encounters:   04/03/19 132/78   01/02/19 138/88   09/08/18 (!) 143/94     Body mass index is 18.32 kg/m². Constitutional: Patient appears well-developed and well-nourished.  No distress. Head: Normocephalic and atraumatic. Neck: Normal range of motion. Neck supple. No thyroidmegaly. Cardiovascular: Normal rate, regular rhythm, normal heart sounds and intact distal pulses. Pulmonary/Chest: Effort normal and breath sounds normal. No stridor. No respiratory distress. No wheezes and no rales. Abdominal: Soft. Bowel sounds are normal. No distension and no mass. No tenderness. No rebound and no guarding. Musculoskeletal: No edema and no tenderness. Skin: No rash or erythema. Psychiatric: Normal mood and affect. Behavior is normal.     Assessment/Plan:  Dorie Chinchilla was seen today for hypertension. Diagnoses and all orders for this visit:    Irritable bowel syndrome with constipation  Start linaclotide (LINZESS) 145 MCG capsule; Take 1 capsule by mouth every morning (before breakfast)    Essential hypertension    Primary osteoarthritis of both knees    Environmental and seasonal allergies        Return Medicare Wellness and constipation f/u 4 weeks.

## 2019-04-10 DIAGNOSIS — M17.0 PRIMARY OSTEOARTHRITIS OF BOTH KNEES: ICD-10-CM

## 2019-04-10 RX ORDER — MELOXICAM 15 MG/1
15 TABLET ORAL DAILY
Qty: 90 TABLET | Refills: 0 | OUTPATIENT
Start: 2019-04-10 | End: 2019-07-09

## 2019-05-09 DIAGNOSIS — K58.1 IRRITABLE BOWEL SYNDROME WITH CONSTIPATION: ICD-10-CM

## 2019-05-09 RX ORDER — LINACLOTIDE 145 UG/1
CAPSULE, GELATIN COATED ORAL
Qty: 30 CAPSULE | Refills: 0 | Status: SHIPPED | OUTPATIENT
Start: 2019-05-09 | End: 2019-07-25

## 2019-05-09 NOTE — TELEPHONE ENCOUNTER
Requested Prescriptions     Pending Prescriptions Disp Refills    LINZESS 145 MCG capsule [Pharmacy Med Name: Jaleesa Kamran 145 MCG CAPSULE] 30 capsule 0     Sig: TAKE 1 CAPSULE BY MOUTH EVERY DAY IN THE MORNING BEFORE BREAKFAST       LAST REFILL 4/3/19  QUANTITY 30         REFILLS 0  LAST VISIT 4/3/19  NEXT VISIT n/a cancelled AWV 5/9/19    [facility] [phone]

## 2019-05-30 ENCOUNTER — OFFICE VISIT (OUTPATIENT)
Dept: INTERNAL MEDICINE CLINIC | Age: 84
End: 2019-05-30
Payer: MEDICARE

## 2019-05-30 VITALS
WEIGHT: 121 LBS | HEART RATE: 70 BPM | BODY MASS INDEX: 18.99 KG/M2 | OXYGEN SATURATION: 98 % | DIASTOLIC BLOOD PRESSURE: 68 MMHG | SYSTOLIC BLOOD PRESSURE: 130 MMHG | HEIGHT: 67 IN

## 2019-05-30 DIAGNOSIS — M17.0 PRIMARY OSTEOARTHRITIS OF BOTH KNEES: ICD-10-CM

## 2019-05-30 DIAGNOSIS — K58.1 IRRITABLE BOWEL SYNDROME WITH CONSTIPATION: ICD-10-CM

## 2019-05-30 DIAGNOSIS — Z00.00 ROUTINE GENERAL MEDICAL EXAMINATION AT A HEALTH CARE FACILITY: Primary | ICD-10-CM

## 2019-05-30 DIAGNOSIS — R53.1 WEAKNESS GENERALIZED: ICD-10-CM

## 2019-05-30 DIAGNOSIS — I10 ESSENTIAL HYPERTENSION: ICD-10-CM

## 2019-05-30 PROBLEM — F03.90 SENILE DEMENTIA WITHOUT BEHAVIORAL DISTURBANCE (HCC): Status: ACTIVE | Noted: 2019-05-30

## 2019-05-30 PROCEDURE — 4040F PNEUMOC VAC/ADMIN/RCVD: CPT | Performed by: INTERNAL MEDICINE

## 2019-05-30 PROCEDURE — G8399 PT W/DXA RESULTS DOCUMENT: HCPCS | Performed by: INTERNAL MEDICINE

## 2019-05-30 PROCEDURE — G8427 DOCREV CUR MEDS BY ELIG CLIN: HCPCS | Performed by: INTERNAL MEDICINE

## 2019-05-30 PROCEDURE — G0439 PPPS, SUBSEQ VISIT: HCPCS | Performed by: INTERNAL MEDICINE

## 2019-05-30 PROCEDURE — 1090F PRES/ABSN URINE INCON ASSESS: CPT | Performed by: INTERNAL MEDICINE

## 2019-05-30 PROCEDURE — 1036F TOBACCO NON-USER: CPT | Performed by: INTERNAL MEDICINE

## 2019-05-30 PROCEDURE — 99214 OFFICE O/P EST MOD 30 MIN: CPT | Performed by: INTERNAL MEDICINE

## 2019-05-30 PROCEDURE — G8420 CALC BMI NORM PARAMETERS: HCPCS | Performed by: INTERNAL MEDICINE

## 2019-05-30 PROCEDURE — 1123F ACP DISCUSS/DSCN MKR DOCD: CPT | Performed by: INTERNAL MEDICINE

## 2019-05-30 RX ORDER — AMLODIPINE BESYLATE 10 MG/1
5 TABLET ORAL DAILY
Qty: 1 TABLET | Refills: 0 | COMMUNITY
Start: 2019-05-30 | End: 2019-11-14 | Stop reason: ALTCHOICE

## 2019-05-30 ASSESSMENT — LIFESTYLE VARIABLES: HOW OFTEN DO YOU HAVE A DRINK CONTAINING ALCOHOL: 0

## 2019-05-30 ASSESSMENT — PATIENT HEALTH QUESTIONNAIRE - PHQ9
SUM OF ALL RESPONSES TO PHQ QUESTIONS 1-9: 1
SUM OF ALL RESPONSES TO PHQ QUESTIONS 1-9: 1

## 2019-05-30 ASSESSMENT — ANXIETY QUESTIONNAIRES: GAD7 TOTAL SCORE: 1

## 2019-05-30 NOTE — PATIENT INSTRUCTIONS
Once you run out of Lisinopril 40 mg or Amlodipine 10 mg, you start taking a whole pill of one of the blood pressure medication    Personalized Preventive Plan for Rin Anthony - 5/30/2019  Medicare offers a range of preventive health benefits. Some of the tests and screenings are paid in full while other may be subject to a deductible, co-insurance, and/or copay. Some of these benefits include a comprehensive review of your medical history including lifestyle, illnesses that may run in your family, and various assessments and screenings as appropriate. After reviewing your medical record and screening and assessments performed today your provider may have ordered immunizations, labs, imaging, and/or referrals for you. A list of these orders (if applicable) as well as your Preventive Care list are included within your After Visit Summary for your review. Other Preventive Recommendations:    · A preventive eye exam performed by an eye specialist is recommended every 1-2 years to screen for glaucoma; cataracts, macular degeneration, and other eye disorders. · A preventive dental visit is recommended every 6 months. · Try to get at least 150 minutes of exercise per week or 10,000 steps per day on a pedometer . · Order or download the FREE \"Exercise & Physical Activity: Your Everyday Guide\" from [employer]. Call 9-684.293.2181 or search [employer] online. · You need 6342-7346 mg of calcium and 4668-3271 IU of vitamin D per day. It is possible to meet your calcium requirement with diet alone, but a vitamin D supplement is usually necessary to meet this goal.  · When exposed to the sun, use a sunscreen that protects against both UVA and UVB radiation with an SPF of 30 or greater. Reapply every 2 to 3 hours or after sweating, drying off with a towel, or swimming. · Always wear a seat belt when traveling in a car.  Always wear a helmet when riding a bicycle or motorcycle.

## 2019-05-30 NOTE — PROGRESS NOTES
Medicare Annual Wellness Visit  Name: Antonia Ovalles Date: 2019   MRN: [id number] Sex: Female   Age: 80 y.o. Ethnicity: /   : 1935 Race: Juan Garcia is here for Medicare AWV and Hypertension    Screenings for behavioral, psychosocial and functional/safety risks, and cognitive dysfunction are all negative except as indicated below. These results, as well as other patient data from the [address] form, are documented in Flowsheets linked to this Encounter. Allergies   Allergen Reactions    Hydrochlorothiazide      Constipation and severe leg cramps     Prior to Visit Medications    Medication Sig Taking? Authorizing Provider   LINZESS 145 MCG capsule TAKE 1 CAPSULE BY MOUTH EVERY DAY IN THE MORNING BEFORE BREAKFAST Yes Mark Harris MD   amLODIPine (NORVASC) 5 MG tablet Take 5 mg by mouth daily Yes Historical Provider, MD   Cholecalciferol (CVS D3) 2000 units CAPS TAKE ONE CAPSULE BY MOUTH DAILY Yes Diane Harris MD   Cyanocobalamin (B-12) 1000 MCG TABS Take 1,000 mcg by mouth daily Yes Diane Harris MD   magnesium hydroxide (MILK OF MAGNESIA) 400 MG/5ML suspension Take 30 mLs by mouth daily as needed for Constipation Yes Zhanna Harris MD   Misc. Devices (ROLLER [place]) MISC 1 each by Does not apply route daily Yes Diane Harris MD   lisinopril (PRINIVIL;ZESTRIL) 40 MG tablet Take 1 tablet by mouth daily  Patient taking differently: Take 20 mg by mouth daily Patient states she only takes 10 mg Yes Дмитрий Cooney DO   fluticasone (FLOVENT HFA) 110 MCG/ACT inhaler INHALE 1 PUFF INTO THE LUNGS 2 TIMES DAILY. Patient taking differently: 2 times daily as needed INHALE 1 PUFF INTO THE LUNGS 2 TIMES DAILY.  Yes Дмитрий Cooney DO   aspirin 325 MG EC tablet Take 325 mg by mouth every 4 hours as needed  Yes Historical Provider, MD   meloxicam (MOBIC) 15 MG tablet Take 1 tablet by mouth daily  Elmira Flowers MD     Past Medical History:   Diagnosis Date    Anxiety 7/20/2012    Arthritis     Asthma     Carpal tunnel syndrome of left wrist 10/6/2011    Depression 5/2/2012    GERD (gastroesophageal reflux disease) 9/4/2013    Hematuria     chronic from stone    History of nonadherence to medical treatment 10/11/2014    Hypertension     Kidney stone     saw Dr Amos Verde - 6 cm stone    Thyroid disease     goiter    Wears glasses     for reading     Past Surgical History:   Procedure Laterality Date    CARPAL TUNNEL RELEASE Right 09/14/2011    OPEN REDUCTION INTERNAL FIXATION RIGHT WRIST (RUSSO'S FRACTURE)    HYSTERECTOMY       Family History   Problem Relation Age of Onset    Other Mother         scleroderma    Heart Disease Father         MI    Heart Attack Son 43    Diabetes Son        CareTeam (Including outside providers/suppliers regularly involved in providing care):   Patient Care Team:  Zhanna Harris MD as PCP - General  Zhanna Harris MD as PCP - S Attributed Provider    Wt Readings from Last 3 Encounters:   05/30/19 121 lb (54.9 kg)   04/03/19 117 lb (53.1 kg)   02/04/19 121 lb 0.5 oz (54.9 kg)     Vitals:    05/30/19 1316   BP: 130/68   Pulse: 70   SpO2: 98%   Weight: 121 lb (54.9 kg)   Height: 5' 7\" (1.702 m)     Body mass index is 18.95 kg/m². Based upon direct observation of the patient, evaluation of cognition reveals recent and remote memory intact. Patient's complete Health Risk Assessment and screening values have been reviewed and are found in Flowsheets. The following problems were reviewed today and where indicated follow up appointments were made and/or referrals ordered. Positive Risk Factor Screenings with Interventions:     Fall Risk:  Timed Up and Go Test > 12 seconds?: (!) yes  2 or more falls in past year?: no  Fall with injury in past year?: (!) yes  Fall Risk Assessment[de-identified] (!) Positive Screening for Falls  Fall Risk Interventions:    · walk with rolling walker    Cognitive:   Words recalled: 2 Words Recalled  Total Score Interpretation: Positive Mini-Cog  Cognitive Impairment Interventions:  · She's able to remember once she focuses    General Health:  General  In general, how would you say your health is?: Fair  In the past 7 days, have you experienced any of the following? New or Increased Pain, New or Increased Fatigue, Loneliness, Social Isolation, Stress or Anger?: (!) Loneliness, New or Increased Pain, Stress  Do you get the social and emotional support that you need?: (!) No  Do you have a Living Will?: (!) No  General Health Risk Interventions:  · No family member to check on her. She has a house keeper from [employer] to come every 2 weeks to help her clean her house. Health Habits/Nutrition:  Health Habits/Nutrition  Do you exercise for at least 20 minutes 2-3 times per week?: (!) No  Have you lost any weight without trying in the past 3 months?: No  Do you eat fewer than 2 meals per day?: No  Have you seen a dentist within the past year?: Yes  Body mass index is 18.95 kg/m². Health Habits/Nutrition Interventions:  · Inadequate physical activity:  she will try to move around with a walker as much as possible    Hearing/Vision:  Hearing/Vision  Do you or your family notice any trouble with your hearing?: No  Do you have difficulty driving, watching TV, or doing any of your daily activities because of your eyesight?: (!) Yes  Have you had an eye exam within the past year?: (!) No  Hearing/Vision Interventions:  Vision concerns:  patient encouraged to make appointment with his/her eye specialist   [facility]  YOB: 1935    Date of Service:  5/30/2019    Chief Complaint:      Chief Complaint   Patient presents with    Medicare AWV    Hypertension       HPI:  [facility] is a 80 y.o.   Lab Results   Component Value Date    LABMICR YES 09/08/2018     Lab Results   Component Value Date     09/08/2018    K 3.9 09/08/2018    CL 98 (L) 09/08/2018    CO2 29 09/08/2018    BUN 24 (H) 09/08/2018    CREATININE 0.7 09/08/2018    GLUCOSE 193 (H) 09/08/2018    CALCIUM 9.4 09/08/2018     Lab Results   Component Value Date    CHOL 150 01/20/2018    TRIG 56 01/20/2018    HDL 47 01/20/2018    LDLCALC 92 01/20/2018     Lab Results   Component Value Date    ALT 13 09/08/2018    AST 16 09/08/2018     Lab Results   Component Value Date    TSH 0.57 05/19/2015    TSH 1.18 10/06/2011    T4FREE 1.05 10/06/2011    T4FREE 1.05 10/06/2011     Lab Results   Component Value Date    WBC 5.8 09/08/2018    HGB 13.5 09/08/2018    HCT 40.9 09/08/2018    MCV 90.0 09/08/2018     09/08/2018     Lab Results   Component Value Date    INR 1.10 10/31/2016      No results found for: PSA   No results found for: [facility]     Patient Active Problem List   Diagnosis    Carpal tunnel syndrome of left wrist    Mild intermittent asthma    Essential hypertension    Goiter    Vitamin D deficiency    Staghorn calculus    Biceps rupture, proximal    Incomplete tear of right rotator cuff    History of nonadherence to medical treatment    B12 deficiency    Primary osteoarthritis of both knees    Irritable bowel syndrome with constipation    Environmental and seasonal allergies    Senile dementia without behavioral disturbance    Weakness generalized       Allergies   Allergen Reactions    Hydrochlorothiazide      Constipation and severe leg cramps     Outpatient Medications Marked as Taking for the 5/30/19 encounter (Office Visit) with Malika Harris MD   Medication Sig Dispense Refill    LINZESS 145 MCG capsule TAKE 1 CAPSULE BY MOUTH EVERY DAY IN THE MORNING BEFORE BREAKFAST 30 capsule 0    amLODIPine (NORVASC) 5 MG tablet Take 5 mg by mouth daily      Cholecalciferol (CVS D3) 2000 units CAPS TAKE ONE CAPSULE BY MOUTH DAILY 90 capsule 3    Cyanocobalamin (B-12) 1000 MCG TABS Take 1,000 mcg by mouth daily 30 tablet 2    magnesium hydroxide (MILK OF MAGNESIA) 400 MG/5ML suspension Take 30 mLs by mouth daily as needed for Constipation 1 Bottle 0    Misc. Devices (ROLLER WALKER) MISC 1 each by Does not apply route daily 1 each 0    lisinopril (PRINIVIL;ZESTRIL) 40 MG tablet Take 1 tablet by mouth daily (Patient taking differently: Take 20 mg by mouth daily Patient states she only takes 10 mg) 90 tablet 3    fluticasone (FLOVENT HFA) 110 MCG/ACT inhaler INHALE 1 PUFF INTO THE LUNGS 2 TIMES DAILY. (Patient taking differently: 2 times daily as needed INHALE 1 PUFF INTO THE LUNGS 2 TIMES DAILY. ) 1 Inhaler 5    aspirin 325 MG EC tablet Take 325 mg by mouth every 4 hours as needed            Review of Systems: 14 systems were negative except of what was stated on HPI    Nursing note and vitals reviewed. Vitals:    05/30/19 1316   BP: 130/68   Pulse: 70   SpO2: 98%   Weight: 121 lb (54.9 kg)   Height: 5' 7\" (1.702 m)     Wt Readings from Last 3 Encounters:   05/30/19 121 lb (54.9 kg)   04/03/19 117 lb (53.1 kg)   02/04/19 121 lb 0.5 oz (54.9 kg)     BP Readings from Last 3 Encounters:   05/30/19 130/68   04/03/19 132/78   01/02/19 138/88     Body mass index is 18.95 kg/m². Constitutional: Patient appears well-developed and well-nourished. No distress. Head: Normocephalic and atraumatic. Neck: Normal range of motion. Neck supple. No thyroidmegaly. Cardiovascular: Normal rate, regular rhythm, normal heart sounds and intact distal pulses. Pulmonary/Chest: Effort normal and breath sounds normal. No stridor. No respiratory distress. No wheezes and no rales. Abdominal: Soft. Bowel sounds are normal. No distension and no mass. No tenderness. No rebound and no guarding. Musculoskeletal: No edema and no tenderness. Skin: No rash or erythema. Psychiatric: Normal mood and affect. Behavior is normal.     Assessment/Plan:  Beto Murillo was seen today for medicare awv and hypertension.     Diagnoses and all orders for this visit:    Routine general medical examination at a health care facility    Essential hypertension    Primary

## 2019-05-30 NOTE — PROGRESS NOTES
wrist    Mild intermittent asthma    Essential hypertension    Goiter    Vitamin D deficiency    Staghorn calculus    Biceps rupture, proximal    Incomplete tear of right rotator cuff    History of nonadherence to medical treatment    B12 deficiency    Primary osteoarthritis of both knees    Irritable bowel syndrome with constipation    Environmental and seasonal allergies    Senile dementia without behavioral disturbance    Weakness generalized       Allergies   Allergen Reactions    Hydrochlorothiazide      Constipation and severe leg cramps     Outpatient Medications Marked as Taking for the 5/30/19 encounter (Office Visit) with Bk Harris MD   Medication Sig Dispense Refill    LINZESS 145 MCG capsule TAKE 1 CAPSULE BY MOUTH EVERY DAY IN THE MORNING BEFORE BREAKFAST 30 capsule 0    amLODIPine (NORVASC) 5 MG tablet Take 5 mg by mouth daily      Cholecalciferol (CVS D3) 2000 units CAPS TAKE ONE CAPSULE BY MOUTH DAILY 90 capsule 3    Cyanocobalamin (B-12) 1000 MCG TABS Take 1,000 mcg by mouth daily 30 tablet 2    magnesium hydroxide (MILK OF MAGNESIA) 400 MG/5ML suspension Take 30 mLs by mouth daily as needed for Constipation 1 Bottle 0    Misc. Devices (ROLLER WALKER) MISC 1 each by Does not apply route daily 1 each 0    lisinopril (PRINIVIL;ZESTRIL) 40 MG tablet Take 1 tablet by mouth daily (Patient taking differently: Take 20 mg by mouth daily Patient states she only takes 10 mg) 90 tablet 3    fluticasone (FLOVENT HFA) 110 MCG/ACT inhaler INHALE 1 PUFF INTO THE LUNGS 2 TIMES DAILY. (Patient taking differently: 2 times daily as needed INHALE 1 PUFF INTO THE LUNGS 2 TIMES DAILY. ) 1 Inhaler 5    aspirin 325 MG EC tablet Take 325 mg by mouth every 4 hours as needed            Review of Systems: 14 systems were negative except of what was stated on HPI    Nursing note and vitals reviewed.     Vitals:    05/30/19 1316   BP: 130/68   Pulse: 70   SpO2: 98%   Weight: 121 lb (54.9 kg)   Height: 5' 7\" (1.702 m)     Wt Readings from Last 3 Encounters:   05/30/19 121 lb (54.9 kg)   04/03/19 117 lb (53.1 kg)   02/04/19 121 lb 0.5 oz (54.9 kg)     BP Readings from Last 3 Encounters:   05/30/19 130/68   04/03/19 132/78   01/02/19 138/88     Body mass index is 18.95 kg/m². Constitutional: Patient appears well-developed and well-nourished. No distress. Head: Normocephalic and atraumatic. Neck: Normal range of motion. Neck supple. No thyroidmegaly. Cardiovascular: Normal rate, regular rhythm, normal heart sounds and intact distal pulses. Pulmonary/Chest: Effort normal and breath sounds normal. No stridor. No respiratory distress. No wheezes and no rales. Abdominal: Soft. Bowel sounds are normal. No distension and no mass. No tenderness. No rebound and no guarding. Musculoskeletal: No edema and no tenderness. Skin: No rash or erythema. Psychiatric: Normal mood and affect. Behavior is normal.     Assessment/Plan:  Samantha Oreilly was seen today for medicare awv and hypertension. Diagnoses and all orders for this visit:    Routine general medical examination at a health care facility    Essential hypertension  Once she runs out of Lisinopril 40 mg or Amlodipine 10 mg, start taking a whole pill of one of the blood pressure medication    Primary osteoarthritis of both knees  -     [facility] [place]    Weakness generalized  -     [facility] [place]    Irritable bowel syndrome with constipation        Return 2 months BP and weak.

## 2019-06-18 ENCOUNTER — HOSPITAL ENCOUNTER (OUTPATIENT)
Dept: PHYSICAL THERAPY | Age: 84
Setting detail: THERAPIES SERIES
Discharge: HOME | End: 2019-06-18
Payer: MEDICARE

## 2019-06-18 PROCEDURE — 97110 THERAPEUTIC EXERCISES: CPT

## 2019-06-18 PROCEDURE — 97161 PT EVAL LOW COMPLEX 20 MIN: CPT

## 2019-06-18 NOTE — PROGRESS NOTES
Outpatient Physical Therapy  Phone: [phone] Fax: [phone]     To: Patito Courtney MD       From: Driss Alexis PT     Patient: Cameron Little        : 1935  Diagnosis: B knee OA      Date: 2019    Physical Therapy Certification/Re-Certification Form  Dear Dr. Deja Betts,  The following patient has been evaluated for physical therapy services and for therapy to continue, Medicare requires monthly physician review of the treatment plan. Please review the attached evaluation and/or summary of the patient's plan of care, and verify that you agree therapy should continue by signing the attached document and sending it back to our office. Plan of Care/Treatment to date:  [x] Therapeutic Exercise   [] Modalities:  [] Therapeutic Activity     [] Ultrasound  [] Electrical Stimulation   [x] Gait Training      [] Cervical Traction [] Lumbar Traction  [] Neuromuscular Re-education  [] Hot/Coldpack [] Iontophoresis    [x] Instruction in HEP      Other:  [x] Manual Therapy       []                        [] Aquatic Therapy       []                      ? Frequency/Duration:  # Days per week: [] 1 day # Weeks: [] 1 week [] 5 weeks      [x] 2 days? [] 2 weeks [] 6 weeks     [] 3 days   [] 3 weeks [] 7 weeks     [] 4 days   [x] 4 weeks [] 8 weeks    Rehab Potential: [] Excellent [] Good [x] Fair  [] Poor       Electronically signed by:  Driss Alexis PT      If you have any questions or concerns, please don't hesitate to call.   Thank you for your referral.    Physician Signature:________________________________Date:__________________  By signing above, therapists plan is approved by physician        Physical Therapy  Initial Assessment  Date: 2019  Patient Name: Cameron Little  MRN: [id number]  : 1935     Treatment Diagnosis: M17.0    Restrictions  Position Activity Restriction  Other position/activity restrictions: none    Subjective   General  Chart Reviewed: Yes  Patient assessed for rehabilitation services?: Yes  Referring Practitioner: Robert Novoa MD  Referral Date : 06/18/19  Diagnosis: OA of both knees  General Comment  Comments: PLOF: working as an RN, indep in ADLs  PT Visit Information  Onset Date: 04/18/19  PT Insurance Information: Medicare  Subjective  Subjective: Pt reports that she is still dealing with knee pain but it is worse in the last couple months. Pt notes that she has been having falls now because her knees are giving out on her. Her last fall was a couple weeks ago. Pt reports that her RLE is still worse than her LLE but the LLE is quickly approaching the R. She is still doing the exercises in the morning to stretch out. Pt has since moved to a place that does not have any steps, but she still lives alone. Pt also reprots that she got fired from her job on March 3. She gets transportation from [employer] and meals from [employer]. Spends most of her day in the apartment. Drives very infrequently. Notes that she does not feel like doing anything. Small bouts of housework at a time.   Pain Screening  Patient Currently in Pain: Denies(at rest; when she is standing pain is moderate)  Vital Signs  Patient Currently in Pain: Denies(at rest; when she is standing pain is moderate)    Objective     Observation/Palpation  Posture: Poor(FF, B knee flexion)  Observation: gait - pt ambulated with 4WW, FF with forward lean, on toes, small shuffing steps    AROM RLE (degrees)  R Knee Flexion 0-145: 18-91*  AROM LLE (degrees)  L Knee Flexion 0-145: *    Strength RLE  R Hip Flexion: 4/5  R Hip Extension: 3/5  R Hip ABduction: 3/5  R Hip External Rotation: 3+/5  Strength LLE  L Hip Flexion: 4/5  L Hip Extension: 3/5  L Hip ABduction: 3/5  L Hip External Rotation: 3+/5     Additional Measures  Special Tests: DTRs - L3/4 and S1/2 2+ BLE; (-) clonus BLE  Sensation  Overall Sensation Status: WFL  Bed mobility  Bridging: Independent  Supine to Sit: Independent  Sit to Supine: Independent  Scooting: Independent  Transfers  Sit to Stand: Supervision  Stand to sit: Supervision    Assessment   Conditions Requiring Skilled Therapeutic Intervention  Body structures, Functions, Activity limitations: Decreased functional mobility ; Decreased ADL status; Decreased ROM; Decreased strength;Decreased balance; Increased Pain;Decreased endurance  Assessment: Pt presented to physical therapy with worsening B knee OA symptoms. Pt demonstrated significant limitations in B knee AROM, most notably TKE. Pt also demonstrated poor muscle firing patterns of glute complex BLE. Pt with altered gait sequence, placing her at a high risk for falls while ambulating.   Treatment Diagnosis: M17.0  Prognosis: Fair  Decision Making: Low Complexity  REQUIRES PT FOLLOW UP: Yes  Activity Tolerance  Activity Tolerance: Patient Tolerated treatment well         Plan   Plan  Times per week: 2x/wk  Plan weeks: 4 weeks  Current Treatment Recommendations: Strengthening, ROM, Balance Training, Neuromuscular Re-education, Manual Therapy - Soft Tissue Mobilization, Endurance Training, Gait Training, Home Exercise Program    Goals  Short term goals  Time Frame for Short term goals: 4 weeks  Short term goal 1: pt will be indep in HEP  Short term goal 2: pt will decrease pain 25%  Short term goal 3: pt will increase B knee TKE by 5 degrees  Short term goal 4: pt will ambulate with increased stride length and glut activation       Therapy Time   Individual Concurrent Group Co-treatment   Time In 1005         Time Out 1100         Minutes 55         Timed Code Treatment Minutes: [id number] [facility] [facility], [address] [place] [address] [address]

## 2019-06-18 NOTE — FLOWSHEET NOTE
they present. Facilitate correct muscle firing patterns and activation with appropriate activities. Progress patient as tolerated.      Re-Certification Due Date:         Signature:  Aaron Hawkins PT

## 2019-07-09 ENCOUNTER — HOSPITAL ENCOUNTER (OUTPATIENT)
Dept: PHYSICAL THERAPY | Age: 84
Setting detail: THERAPIES SERIES
Discharge: HOME | End: 2019-07-09
Payer: MEDICARE

## 2019-07-09 PROCEDURE — 97110 THERAPEUTIC EXERCISES: CPT

## 2019-07-09 NOTE — FLOWSHEET NOTE
pt will increase B knee TKE by 5 degrees  Short term goal 4: pt will ambulate with increased stride length and glut activation           Plan: [x] Continue per plan of care [] Alter current plan (see comments)   [] Plan of care initiated [] Hold pending MD visit [] Discharge      Plan for Next Session:  Biomechanical correction of problems as they present. Facilitate correct muscle firing patterns and activation with appropriate activities. Progress patient as tolerated.      Re-Certification Due Date:         Signature:  Marcie Boston PT

## 2019-07-12 ENCOUNTER — HOSPITAL ENCOUNTER (OUTPATIENT)
Dept: PHYSICAL THERAPY | Age: 84
Setting detail: THERAPIES SERIES
Discharge: HOME | End: 2019-07-12
Payer: MEDICARE

## 2019-07-12 PROCEDURE — 97110 THERAPEUTIC EXERCISES: CPT

## 2019-07-16 ENCOUNTER — HOSPITAL ENCOUNTER (OUTPATIENT)
Dept: PHYSICAL THERAPY | Age: 84
Setting detail: THERAPIES SERIES
Discharge: HOME | End: 2019-07-16
Payer: MEDICARE

## 2019-07-16 PROCEDURE — 97110 THERAPEUTIC EXERCISES: CPT

## 2019-07-19 ENCOUNTER — APPOINTMENT (OUTPATIENT)
Dept: PHYSICAL THERAPY | Age: 84
End: 2019-07-19
Payer: MEDICARE

## 2019-07-23 ENCOUNTER — HOSPITAL ENCOUNTER (OUTPATIENT)
Dept: PHYSICAL THERAPY | Age: 84
Setting detail: THERAPIES SERIES
Discharge: HOME | End: 2019-07-23
Payer: MEDICARE

## 2019-07-23 PROCEDURE — 97110 THERAPEUTIC EXERCISES: CPT

## 2019-07-25 ENCOUNTER — OFFICE VISIT (OUTPATIENT)
Dept: INTERNAL MEDICINE CLINIC | Age: 84
End: 2019-07-25
Payer: MEDICARE

## 2019-07-25 VITALS
HEIGHT: 67 IN | BODY MASS INDEX: 18.52 KG/M2 | OXYGEN SATURATION: 98 % | SYSTOLIC BLOOD PRESSURE: 138 MMHG | DIASTOLIC BLOOD PRESSURE: 84 MMHG | HEART RATE: 61 BPM | WEIGHT: 118 LBS

## 2019-07-25 DIAGNOSIS — K58.1 IRRITABLE BOWEL SYNDROME WITH CONSTIPATION: ICD-10-CM

## 2019-07-25 DIAGNOSIS — E53.8 B12 DEFICIENCY: ICD-10-CM

## 2019-07-25 DIAGNOSIS — I10 ESSENTIAL HYPERTENSION: Primary | ICD-10-CM

## 2019-07-25 DIAGNOSIS — H61.22 HEARING LOSS OF LEFT EAR DUE TO CERUMEN IMPACTION: ICD-10-CM

## 2019-07-25 DIAGNOSIS — R53.1 WEAKNESS GENERALIZED: ICD-10-CM

## 2019-07-25 PROCEDURE — G8419 CALC BMI OUT NRM PARAM NOF/U: HCPCS | Performed by: INTERNAL MEDICINE

## 2019-07-25 PROCEDURE — 4040F PNEUMOC VAC/ADMIN/RCVD: CPT | Performed by: INTERNAL MEDICINE

## 2019-07-25 PROCEDURE — 69209 REMOVE IMPACTED EAR WAX UNI: CPT | Performed by: INTERNAL MEDICINE

## 2019-07-25 PROCEDURE — 1090F PRES/ABSN URINE INCON ASSESS: CPT | Performed by: INTERNAL MEDICINE

## 2019-07-25 PROCEDURE — G8427 DOCREV CUR MEDS BY ELIG CLIN: HCPCS | Performed by: INTERNAL MEDICINE

## 2019-07-25 PROCEDURE — 1123F ACP DISCUSS/DSCN MKR DOCD: CPT | Performed by: INTERNAL MEDICINE

## 2019-07-25 PROCEDURE — 99213 OFFICE O/P EST LOW 20 MIN: CPT | Performed by: INTERNAL MEDICINE

## 2019-07-25 PROCEDURE — 1036F TOBACCO NON-USER: CPT | Performed by: INTERNAL MEDICINE

## 2019-07-25 PROCEDURE — G8399 PT W/DXA RESULTS DOCUMENT: HCPCS | Performed by: INTERNAL MEDICINE

## 2019-07-25 NOTE — PROGRESS NOTES
04/03/19 132/78     Body mass index is 18.48 kg/m². Constitutional: Patient appears well-developed and well-nourished. No distress. Head: Normocephalic and atraumatic. Neck: Normal range of motion. Neck supple. No thyroidmegaly. Cardiovascular: Normal rate, regular rhythm, normal heart sounds and intact distal pulses. Pulmonary/Chest: Effort normal and breath sounds normal. No stridor. No respiratory distress. No wheezes and no rales. Abdominal: Soft. Bowel sounds are normal. No distension and no mass. No tenderness. No rebound and no guarding. Musculoskeletal: No edema and no tenderness. Skin: No rash or erythema. Psychiatric: Normal mood and affect. Behavior is normal.   Left ear cerumen impaction    Assessment/Plan:  Austin Manley was seen today for fatigue and hypertension. Diagnoses and all orders for this visit:    Essential hypertension  Once she runs out of Lisinopril 40 mg or Amlodipine 10 mg, start taking a whole pill of one of the blood pressure medication    Weakness generalized    Irritable bowel syndrome with constipation    B12 deficiency    Hearing loss of left ear due to cerumen impaction  -     FL REMOVAL IMPACTED CERUMEN IRRIGATION/LVG UNILAT        Return November for BP.

## 2019-07-26 ENCOUNTER — HOSPITAL ENCOUNTER (OUTPATIENT)
Dept: PHYSICAL THERAPY | Age: 84
Setting detail: THERAPIES SERIES
Discharge: HOME | End: 2019-07-26
Payer: MEDICARE

## 2019-07-26 PROCEDURE — 97110 THERAPEUTIC EXERCISES: CPT

## 2019-07-26 NOTE — FLOWSHEET NOTE
Physical Therapy Daily Treatment Note    Date:  2019    Patient Name:  Kinsey Gifford    :  1935  MRN: [id number]  Restrictions/Precautions:    Medical/Treatment Diagnosis Information:  · Diagnosis: OA of both knees  Insurance/Certification information:  PT Insurance Information: Medicare  Physician Information:  Referring Practitioner: Shelbie Bedoya MD  Plan of care signed (Y/N):  N  Visit# / total visits:       G-Code (if applicable):          LEFS     Medicare Cap (if applicable):  887 = total amount used, updated 2019    Time in:   10:30      Timed Treatment: 30 Total Treatment Time:  30  ________________________________________________________________________________________    Pain Level:    /10  SUBJECTIVE:  Pt verbally agreed to being searched for bed bugs prior to prior, per [address] [employer] protocol. Pt reports that she had her apartment sprayed for bed bugs this week and they will return in 2 weeks to check again. She notes that they found them around her windowsill and pictures. OBJECTIVE:     Exercise/Equipment Resistance/Repetitions Other comments   NuStep 7 min           QS     SLR x10 BLE    bridge x10    Supine clams x15 Green TB   Supine hip flexion x10 B Green TB   DKTC x15    Standing marches x15 B    Mini-squats x15    Standing hip abduction x10 B    Calf raises x15    Lateral walking 3 laps in // bars                                              Other Therapeutic Activities:  Pt educated on POC and HEP. Manual Treatments:         Modalities:      Test/Measurements:  See eval       ASSESSMENT:  Pt tolerated session well. Progressed appropriately without complaint.      Treatment/Activity Tolerance:   [x]Patient tolerated treatment well [] Patient limited by fatique  []Patient limited by pain [] Patient limited by other medical complications  [] Other:     Goals:    Short term goals  Time Frame for Short term goals: 4 weeks  Short term goal 1: pt will be indep in HEP  Short term goal 2: pt will decrease pain 25%  Short term goal 3: pt will increase B knee TKE by 5 degrees  Short term goal 4: pt will ambulate with increased stride length and glut activation           Plan: [x] Continue per plan of care [] Alter current plan (see comments)   [] Plan of care initiated [] Hold pending MD visit [] Discharge      Plan for Next Session:  Biomechanical correction of problems as they present. Facilitate correct muscle firing patterns and activation with appropriate activities. Progress patient as tolerated.      Re-Certification Due Date:         Signature:  Mike Castillo , PT

## 2019-07-30 ENCOUNTER — HOSPITAL ENCOUNTER (OUTPATIENT)
Dept: PHYSICAL THERAPY | Age: 84
Setting detail: THERAPIES SERIES
Discharge: HOME | End: 2019-07-30
Payer: MEDICARE

## 2019-07-30 PROCEDURE — 97110 THERAPEUTIC EXERCISES: CPT

## 2019-07-30 NOTE — FLOWSHEET NOTE
Physical Therapy Daily Treatment Note    Date:  2019    Patient Name:  Alverda Angelucci    :  1935  MRN: [id number]  Restrictions/Precautions:    Medical/Treatment Diagnosis Information:  · Diagnosis: OA of both knees  Insurance/Certification information:  PT Insurance Information: Medicare  Physician Information:  Referring Practitioner: Irma Asencio MD  Plan of care signed (Y/N):  N  Visit# / total visits:       G-Code (if applicable):          LEFS     Medicare Cap (if applicable):  119 = total amount used, updated 2019    Time in:   12:13      Timed Treatment: 40 Total Treatment Time:  40  ________________________________________________________________________________________    Pain Level:    /10  SUBJECTIVE:  Pt reports that he was able to schedule a couple more visits but could not get on the schedule for 2 weeks. OBJECTIVE:     Exercise/Equipment Resistance/Repetitions Other comments   NuStep 7 min           QS     SLR x15 BLE    bridge x10    Supine clams x15 Green TB   Supine hip flexion x10 B Green TB   DKTC     Standing marches x15 B    Mini-squats x15    Standing hip abduction x10 B    Calf raises x15    Lateral walking     FSU x10 B 4\"                                        Other Therapeutic Activities:  Pt educated on POC and HEP. Manual Treatments:         Modalities:      Test/Measurements:  See eval       ASSESSMENT:  Pt tolerated session well. Progressed appropriately without complaint.      Treatment/Activity Tolerance:   [x]Patient tolerated treatment well [] Patient limited by fatique  []Patient limited by pain [] Patient limited by other medical complications  [] Other:     Goals:    Short term goals  Time Frame for Short term goals: 4 weeks  Short term goal 1: pt will be indep in HEP  Short term goal 2: pt will decrease pain 25%  Short term goal 3: pt will increase B knee TKE by 5 degrees  Short term goal 4: pt will ambulate with increased stride length and

## 2019-08-16 ENCOUNTER — APPOINTMENT (OUTPATIENT)
Dept: PHYSICAL THERAPY | Age: 84
End: 2019-08-16
Payer: MEDICARE

## 2019-08-20 ENCOUNTER — HOSPITAL ENCOUNTER (OUTPATIENT)
Dept: PHYSICAL THERAPY | Age: 84
Setting detail: THERAPIES SERIES
Discharge: HOME | End: 2019-08-20
Payer: MEDICARE

## 2019-08-20 PROCEDURE — 97110 THERAPEUTIC EXERCISES: CPT

## 2019-08-20 NOTE — FLOWSHEET NOTE
Continue per plan of care [] Alter current plan (see comments)   [] Plan of care initiated [] Hold pending MD visit [] Discharge      Plan for Next Session:  Biomechanical correction of problems as they present. Facilitate correct muscle firing patterns and activation with appropriate activities. Progress patient as tolerated.      Re-Certification Due Date:         Signature:  Monica Sands PT

## 2019-08-27 ENCOUNTER — HOSPITAL ENCOUNTER (OUTPATIENT)
Dept: PHYSICAL THERAPY | Age: 84
Setting detail: THERAPIES SERIES
Discharge: HOME | End: 2019-08-27
Payer: MEDICARE

## 2019-08-27 PROCEDURE — 97110 THERAPEUTIC EXERCISES: CPT

## 2019-11-14 ENCOUNTER — OFFICE VISIT (OUTPATIENT)
Dept: INTERNAL MEDICINE CLINIC | Age: 84
End: 2019-11-14
Payer: MEDICARE

## 2019-11-14 VITALS
BODY MASS INDEX: 18.21 KG/M2 | WEIGHT: 116 LBS | SYSTOLIC BLOOD PRESSURE: 130 MMHG | OXYGEN SATURATION: 94 % | HEIGHT: 67 IN | DIASTOLIC BLOOD PRESSURE: 82 MMHG | HEART RATE: 68 BPM

## 2019-11-14 DIAGNOSIS — Z23 NEED FOR INFLUENZA VACCINATION: ICD-10-CM

## 2019-11-14 DIAGNOSIS — H61.22 HEARING LOSS DUE TO CERUMEN IMPACTION, LEFT: ICD-10-CM

## 2019-11-14 DIAGNOSIS — I10 ESSENTIAL HYPERTENSION: Primary | ICD-10-CM

## 2019-11-14 DIAGNOSIS — G56.02 CARPAL TUNNEL SYNDROME OF LEFT WRIST: ICD-10-CM

## 2019-11-14 PROCEDURE — 1036F TOBACCO NON-USER: CPT | Performed by: INTERNAL MEDICINE

## 2019-11-14 PROCEDURE — G8399 PT W/DXA RESULTS DOCUMENT: HCPCS | Performed by: INTERNAL MEDICINE

## 2019-11-14 PROCEDURE — G8482 FLU IMMUNIZE ORDER/ADMIN: HCPCS | Performed by: INTERNAL MEDICINE

## 2019-11-14 PROCEDURE — 99214 OFFICE O/P EST MOD 30 MIN: CPT | Performed by: INTERNAL MEDICINE

## 2019-11-14 PROCEDURE — 90653 IIV ADJUVANT VACCINE IM: CPT | Performed by: INTERNAL MEDICINE

## 2019-11-14 PROCEDURE — G8419 CALC BMI OUT NRM PARAM NOF/U: HCPCS | Performed by: INTERNAL MEDICINE

## 2019-11-14 PROCEDURE — 4040F PNEUMOC VAC/ADMIN/RCVD: CPT | Performed by: INTERNAL MEDICINE

## 2019-11-14 PROCEDURE — G8427 DOCREV CUR MEDS BY ELIG CLIN: HCPCS | Performed by: INTERNAL MEDICINE

## 2019-11-14 PROCEDURE — 69209 REMOVE IMPACTED EAR WAX UNI: CPT | Performed by: INTERNAL MEDICINE

## 2019-11-14 PROCEDURE — G0008 ADMIN INFLUENZA VIRUS VAC: HCPCS | Performed by: INTERNAL MEDICINE

## 2019-11-14 PROCEDURE — 1090F PRES/ABSN URINE INCON ASSESS: CPT | Performed by: INTERNAL MEDICINE

## 2019-11-14 PROCEDURE — 1123F ACP DISCUSS/DSCN MKR DOCD: CPT | Performed by: INTERNAL MEDICINE

## 2019-11-14 RX ORDER — AMLODIPINE BESYLATE AND BENAZEPRIL HYDROCHLORIDE 5; 20 MG/1; MG/1
1 CAPSULE ORAL DAILY
Qty: 30 CAPSULE | Refills: 0 | Status: SHIPPED | OUTPATIENT
Start: 2019-11-14 | End: 2020-02-10

## 2019-11-26 ENCOUNTER — TELEPHONE (OUTPATIENT)
Dept: INTERNAL MEDICINE CLINIC | Age: 84
End: 2019-11-26

## 2019-11-26 ENCOUNTER — CARE COORDINATION (OUTPATIENT)
Dept: CARE COORDINATION | Age: 84
End: 2019-11-26

## 2019-12-02 ENCOUNTER — CARE COORDINATION (OUTPATIENT)
Dept: CARE COORDINATION | Age: 84
End: 2019-12-02

## 2019-12-04 ENCOUNTER — TELEPHONE (OUTPATIENT)
Dept: INTERNAL MEDICINE CLINIC | Age: 84
End: 2019-12-04

## 2019-12-04 ENCOUNTER — CARE COORDINATION (OUTPATIENT)
Dept: CARE COORDINATION | Age: 84
End: 2019-12-04

## 2019-12-04 DIAGNOSIS — M17.0 PRIMARY OSTEOARTHRITIS OF BOTH KNEES: ICD-10-CM

## 2019-12-04 DIAGNOSIS — H61.20 HEARING LOSS DUE TO CERUMEN IMPACTION, UNSPECIFIED LATERALITY: Primary | ICD-10-CM

## 2019-12-04 DIAGNOSIS — H61.20 IMPACTED CERUMEN, UNSPECIFIED LATERALITY: Primary | ICD-10-CM

## 2019-12-04 RX ORDER — MELOXICAM 15 MG/1
15 TABLET ORAL DAILY
Qty: 90 TABLET | Refills: 1 | Status: SHIPPED | OUTPATIENT
Start: 2019-12-04 | End: 2020-06-04 | Stop reason: SDUPTHER

## 2019-12-09 ENCOUNTER — TELEPHONE (OUTPATIENT)
Dept: INTERNAL MEDICINE CLINIC | Age: 84
End: 2019-12-09

## 2019-12-11 ENCOUNTER — TELEPHONE (OUTPATIENT)
Dept: INTERNAL MEDICINE CLINIC | Age: 84
End: 2019-12-11

## 2019-12-12 ENCOUNTER — CARE COORDINATION (OUTPATIENT)
Dept: CARE COORDINATION | Age: 84
End: 2019-12-12

## 2019-12-16 ENCOUNTER — TELEPHONE (OUTPATIENT)
Dept: INTERNAL MEDICINE CLINIC | Age: 84
End: 2019-12-16

## 2019-12-16 ENCOUNTER — CARE COORDINATION (OUTPATIENT)
Dept: CARE COORDINATION | Age: 84
End: 2019-12-16

## 2019-12-16 DIAGNOSIS — R53.1 WEAKNESS GENERALIZED: ICD-10-CM

## 2019-12-16 DIAGNOSIS — M17.0 PRIMARY OSTEOARTHRITIS OF BOTH KNEES: ICD-10-CM

## 2019-12-16 DIAGNOSIS — Z91.199 HISTORY OF NONADHERENCE TO MEDICAL TREATMENT: ICD-10-CM

## 2019-12-16 DIAGNOSIS — I10 ESSENTIAL HYPERTENSION: Primary | ICD-10-CM

## 2019-12-17 ENCOUNTER — TELEPHONE (OUTPATIENT)
Dept: INTERNAL MEDICINE CLINIC | Age: 84
End: 2019-12-17

## 2019-12-20 ENCOUNTER — TELEPHONE (OUTPATIENT)
Dept: INTERNAL MEDICINE CLINIC | Age: 84
End: 2019-12-20

## 2019-12-23 ENCOUNTER — TELEPHONE (OUTPATIENT)
Dept: INTERNAL MEDICINE CLINIC | Age: 84
End: 2019-12-23

## 2020-01-21 ENCOUNTER — TELEPHONE (OUTPATIENT)
Dept: INTERNAL MEDICINE CLINIC | Age: 85
End: 2020-01-21

## 2020-01-24 ENCOUNTER — TELEPHONE (OUTPATIENT)
Dept: INTERNAL MEDICINE CLINIC | Age: 85
End: 2020-01-24

## 2020-01-27 ENCOUNTER — OFFICE VISIT (OUTPATIENT)
Dept: ORTHOPEDIC SURGERY | Age: 85
End: 2020-01-27
Payer: MEDICARE

## 2020-01-27 VITALS — WEIGHT: 115.96 LBS | HEIGHT: 67 IN | BODY MASS INDEX: 18.2 KG/M2

## 2020-01-27 PROCEDURE — G8427 DOCREV CUR MEDS BY ELIG CLIN: HCPCS | Performed by: PHYSICIAN ASSISTANT

## 2020-01-27 PROCEDURE — G8419 CALC BMI OUT NRM PARAM NOF/U: HCPCS | Performed by: PHYSICIAN ASSISTANT

## 2020-01-27 PROCEDURE — 4040F PNEUMOC VAC/ADMIN/RCVD: CPT | Performed by: PHYSICIAN ASSISTANT

## 2020-01-27 PROCEDURE — 99214 OFFICE O/P EST MOD 30 MIN: CPT | Performed by: PHYSICIAN ASSISTANT

## 2020-01-27 PROCEDURE — 1090F PRES/ABSN URINE INCON ASSESS: CPT | Performed by: PHYSICIAN ASSISTANT

## 2020-01-27 PROCEDURE — 1036F TOBACCO NON-USER: CPT | Performed by: PHYSICIAN ASSISTANT

## 2020-01-27 PROCEDURE — G8482 FLU IMMUNIZE ORDER/ADMIN: HCPCS | Performed by: PHYSICIAN ASSISTANT

## 2020-01-27 PROCEDURE — G8399 PT W/DXA RESULTS DOCUMENT: HCPCS | Performed by: PHYSICIAN ASSISTANT

## 2020-01-27 PROCEDURE — 1123F ACP DISCUSS/DSCN MKR DOCD: CPT | Performed by: PHYSICIAN ASSISTANT

## 2020-01-27 NOTE — PROGRESS NOTES
CHIEF COMPLAINT:    Chief Complaint   Patient presents with    Knee Pain     CK PARTH KNEES R>L       HISTORY OF PRESENT ILLNESS:                The patient is a 80 y.o. female presents today for orthopedic follow-up regarding her bilateral knees. She has known severe osteoarthritis of her bilateral knees which is fairly disabling for her. We have pursued injection therapies in the past with minimal relief. She is had a home exercise program and uses a walker to assist with ambulation. She is essentially homebound and uses [facility] services to get to doctor's appointments. She admits that she does not ambulate much at home due to her severe knee pain. Her right knee in particular is very unstable for her. States she can often not make it to the bathroom in time due to her aright knee pain. Recently, [facility] has been contacted to help this patient with some activities of daily living. They are going to help her with a bath once a week. She does have 1 cat and 1 bird at home.   Past Medical History:   Diagnosis Date    Anxiety 7/20/2012    Arthritis     Asthma     Carpal tunnel syndrome of left wrist 10/6/2011    Depression 5/2/2012    GERD (gastroesophageal reflux disease) 9/4/2013    Hematuria     chronic from stone    History of nonadherence to medical treatment 10/11/2014    Hypertension     Kidney stone     saw Dr Germania Luis - 6 cm stone    Thyroid disease     goiter    Wears glasses     for reading            The pain assessment was noted & is as follows:  Pain Assessment  Location of Pain: Knee  Location Modifiers: Left, Right  Severity of Pain: 8  Quality of Pain: Aching, Dull, Sharp, Throbbing  Duration of Pain: Persistent  Frequency of Pain: Constant]      Work Status/Functionality:     Past Medical History: Medical history form was reviewed today & can be found in the media tab  Past Medical History:   Diagnosis Date    Anxiety 7/20/2012    Arthritis     Asthma     bilateral knee pain. [facility] to help her with some activities of daily living and housecleaning. She admits to a very poor diet due to her inability to get out and get groceries. She also admits to holding her urine for 12 or 13 hours at a time due to her knee pain. Discussed treatment options. She would like to move forward with aright total knee replacement using [employer] cobalt chrome arthroplasty    She will need clearance from her PCP. Will need to stay at a rehab facility postoperatively as she lives alone. We discussed the risk, benefits, and potential complications of total knee replacement arthroplasty surgery. The patient voiced their understanding to concerns that include infection, deep vein thrombosis, neurological injury, and delayed rehabilitation. The patient also realizes that there are concerns regarding the potential need for manipulation under anesthesia if range of motion proves to be problematic. The patient also understands that there is always a chance of dystrophy and anesthetic complications that would include a stroke, cardiopulmonary pathology, and even death. We also discussed the rehabilitation involved with this operation and options that involved not only the hospitalization but then the potential of either going home with visiting home therapy versus going to a rehabilitation facility. We talked about the nuances of each of these treatments as well as utilization of a continuous passive motion machine (CPM machine). The patient also realizes the need for the hinged knee brace in the rehabilitation process as well as the very significant role that the patient plays in terms of rehabilitation after this type of operation. All questions were answered. I have personally performed and/or participated in the history, exam and medical decision making and agree with all pertinent clinical information.  I have also reviewed and agree with the past medical, family and social history unless otherwise noted. This dictation was performed with a verbal recognition program (DRAGON) and it was checked for errors. It is possible that there are still dictated errors within this office note. If so, please bring any errors to my attention for an addendum. All efforts were made to ensure that this office note is accurate.           Jonas Osgood, MD

## 2020-01-28 ENCOUNTER — TELEPHONE (OUTPATIENT)
Dept: INTERNAL MEDICINE CLINIC | Age: 85
End: 2020-01-28

## 2020-01-28 NOTE — TELEPHONE ENCOUNTER
Patient upset the LISINOPRIL was denied. States she needs that medication. Explained to her dr mclain changed her BP medication to amLODIPine-benazepril (LOTREL) 5-20 MG per capsule. Patient said she didn't do well on the combo pill so she has been taking her LISINOPRIL but now she is out and her BP has been high; states she sometimes has to take more than 1. Explained to patient she should have contacted the office much sooner regarding the combo bp medication. Tried to schedule patient for this week but states she cannot due to transportation. So scheduled for Monday 2/3/20    Patient still requesting refill of her LISINOPRIL.

## 2020-01-28 NOTE — TELEPHONE ENCOUNTER
Arleth Rogers called back due to needing to change appointment to Thursday. I was placed on speaker with patient and patients daughter also. They requested Corina hyde assist. I asked what kind of assistance and she said someone to help with PT OT requests and to explain more information regarding visits because patient insists \"dr mclain just sits at her computer and doesn't really talk to her\". I explained that dr mclain would handle those requests and she does engage with patient and does explain til patient acknowledges understanding. I then requested a family member to be present to help reiterated dr mclain instructions at home. However daughter said she babysits 3 kids and cant be here, even after I told her she can bring them. Then patient said she didn't want her daughter present anyways.    I then again informed all on the phone patient is to take her amLODIPine-benazepril (LOTREL) 5-20 MG per capsule   Once a day unless bp is >150/90 then she can increase to twice a day and to follow up on 02/06/20

## 2020-02-10 ENCOUNTER — TELEPHONE (OUTPATIENT)
Dept: ORTHOPEDIC SURGERY | Age: 85
End: 2020-02-10

## 2020-02-10 RX ORDER — AMLODIPINE BESYLATE AND BENAZEPRIL HYDROCHLORIDE 5; 20 MG/1; MG/1
CAPSULE ORAL
Qty: 30 CAPSULE | Refills: 0 | Status: SHIPPED | OUTPATIENT
Start: 2020-02-10 | End: 2020-03-04 | Stop reason: SDUPTHER

## 2020-02-13 ENCOUNTER — TELEPHONE (OUTPATIENT)
Dept: ORTHOPEDIC SURGERY | Age: 85
End: 2020-02-13

## 2020-02-24 ENCOUNTER — OFFICE VISIT (OUTPATIENT)
Dept: ORTHOPEDIC SURGERY | Age: 85
End: 2020-02-24
Payer: MEDICARE

## 2020-02-24 VITALS — HEIGHT: 67 IN | BODY MASS INDEX: 18.2 KG/M2 | WEIGHT: 115.96 LBS

## 2020-02-24 PROCEDURE — G8427 DOCREV CUR MEDS BY ELIG CLIN: HCPCS | Performed by: ORTHOPAEDIC SURGERY

## 2020-02-24 PROCEDURE — G8419 CALC BMI OUT NRM PARAM NOF/U: HCPCS | Performed by: ORTHOPAEDIC SURGERY

## 2020-02-24 PROCEDURE — 4040F PNEUMOC VAC/ADMIN/RCVD: CPT | Performed by: ORTHOPAEDIC SURGERY

## 2020-02-24 PROCEDURE — G8482 FLU IMMUNIZE ORDER/ADMIN: HCPCS | Performed by: ORTHOPAEDIC SURGERY

## 2020-02-24 PROCEDURE — 99213 OFFICE O/P EST LOW 20 MIN: CPT | Performed by: ORTHOPAEDIC SURGERY

## 2020-02-24 PROCEDURE — G8399 PT W/DXA RESULTS DOCUMENT: HCPCS | Performed by: ORTHOPAEDIC SURGERY

## 2020-02-24 PROCEDURE — 1036F TOBACCO NON-USER: CPT | Performed by: ORTHOPAEDIC SURGERY

## 2020-02-24 PROCEDURE — 1123F ACP DISCUSS/DSCN MKR DOCD: CPT | Performed by: ORTHOPAEDIC SURGERY

## 2020-02-24 PROCEDURE — 1090F PRES/ABSN URINE INCON ASSESS: CPT | Performed by: ORTHOPAEDIC SURGERY

## 2020-02-24 RX ORDER — ACETAMINOPHEN AND CODEINE PHOSPHATE 300; 30 MG/1; MG/1
1 TABLET ORAL EVERY 8 HOURS PRN
Qty: 40 TABLET | Refills: 0 | Status: SHIPPED | OUTPATIENT
Start: 2020-02-24 | End: 2020-03-09

## 2020-02-24 NOTE — PROGRESS NOTES
review of systems will be found scanned in the patient's chart. CONSTITUTIONAL: Denies unexplained weight loss, fevers, chills   NEUROLOGICAL: Denies unsteady gait or progressive weakness  SKIN: Denies skin changes, delayed healing, rash, itching       PHYSICAL EXAM:    Vitals: Height 5' 7.01\" (1.702 m), weight 115 lb 15.4 oz (52.6 kg), not currently breastfeeding. GENERAL EXAM:  · General Apparence: Patient is adequately groomed with no evidence of malnutrition. · Orientation: The patient is oriented to time, place and person. · Mood & Affect:The patient's mood and affect are appropriate       BiLateral knees PHYSICAL EXAMINATION:  · Inspection: deformitiesof the bilateral knees consistent with severe degenerative joint disease. · Palpation: Tender diffusely involving the bilateral knees    · Range of Motion: 5-90 degrees bilaterally    · Strength: Extensor mechanism is intact but mild global weakness bilaterally. · Special Tests: Negatrive Homans sign bilaterally          · Skin:  There are no rashes, ulcerations or lesions. · There are no dysvascular changes     Gait & station: She ambulates with a walker      Additional Examinations:              Diagnostic Testing:      Orders   No orders of the defined types were placed in this encounter. Assessment / Treatment Plan:     1. Severe disabling bilateral knee osteoarthritis    Patient's knee pain has significantly affected her quality of life. She wants to move forward with surgery and understands the risks. I will give her a prescription for Tylenol 3 to use sparingly up until surgery.

## 2020-02-25 ENCOUNTER — HOSPITAL ENCOUNTER (OUTPATIENT)
Dept: PREADMISSION TESTING | Age: 85
Discharge: HOME | End: 2020-02-29
Payer: MEDICARE

## 2020-02-25 LAB
ABO/RH: NORMAL
ALBUMIN SERPL-MCNC: 4 G/DL (ref 3.4–5)
AMORPHOUS: ABNORMAL /HPF
ANION GAP SERPL CALCULATED.3IONS-SCNC: 13 MMOL/L (ref 3–16)
ANTIBODY SCREEN: NORMAL
APTT: 32.8 SEC (ref 24.2–36.2)
BACTERIA: ABNORMAL /HPF
BASOPHILS ABSOLUTE: 0 K/UL (ref 0–0.2)
BASOPHILS RELATIVE PERCENT: 0.9 %
BILIRUBIN URINE: ABNORMAL
BLOOD, URINE: ABNORMAL
BUN BLDV-MCNC: 26 MG/DL (ref 7–20)
CALCIUM SERPL-MCNC: 9.6 MG/DL (ref 8.3–10.6)
CHLORIDE BLD-SCNC: 99 MMOL/L (ref 99–110)
CLARITY: ABNORMAL
CO2: 26 MMOL/L (ref 21–32)
COLOR: ABNORMAL
CREAT SERPL-MCNC: 0.9 MG/DL (ref 0.6–1.2)
EOSINOPHILS ABSOLUTE: 0.1 K/UL (ref 0–0.6)
EOSINOPHILS RELATIVE PERCENT: 1.3 %
EPITHELIAL CELLS, UA: ABNORMAL /HPF (ref 0–5)
GFR AFRICAN AMERICAN: >60
GFR NON-AFRICAN AMERICAN: 60
GLUCOSE BLD-MCNC: 83 MG/DL (ref 70–99)
GLUCOSE URINE: NEGATIVE MG/DL
HCT VFR BLD CALC: 37.8 % (ref 36–48)
HEMOGLOBIN: 12.5 G/DL (ref 12–16)
INR BLD: 0.98 (ref 0.86–1.14)
KETONES, URINE: 15 MG/DL
LEUKOCYTE ESTERASE, URINE: NEGATIVE
LYMPHOCYTES ABSOLUTE: 0.9 K/UL (ref 1–5.1)
LYMPHOCYTES RELATIVE PERCENT: 17 %
MCH RBC QN AUTO: 30 PG (ref 26–34)
MCHC RBC AUTO-ENTMCNC: 33.1 G/DL (ref 31–36)
MCV RBC AUTO: 90.7 FL (ref 80–100)
MICROSCOPIC EXAMINATION: YES
MONOCYTES ABSOLUTE: 0.4 K/UL (ref 0–1.3)
MONOCYTES RELATIVE PERCENT: 7.3 %
NEUTROPHILS ABSOLUTE: 3.8 K/UL (ref 1.7–7.7)
NEUTROPHILS RELATIVE PERCENT: 73.5 %
NITRITE, URINE: NEGATIVE
PDW BLD-RTO: 12.9 % (ref 12.4–15.4)
PH UA: 5 (ref 5–8)
PLATELET # BLD: 185 K/UL (ref 135–450)
PMV BLD AUTO: 9.7 FL (ref 5–10.5)
POTASSIUM SERPL-SCNC: 4.6 MMOL/L (ref 3.5–5.1)
PROTEIN UA: 30 MG/DL
PROTHROMBIN TIME: 11.4 SEC (ref 10–13.2)
RBC # BLD: 4.17 M/UL (ref 4–5.2)
RBC UA: >100 /HPF (ref 0–4)
SODIUM BLD-SCNC: 138 MMOL/L (ref 136–145)
SPECIFIC GRAVITY UA: >=1.03 (ref 1–1.03)
TRANSFERRIN: 264 MG/DL (ref 200–360)
URINE TYPE: ABNORMAL
UROBILINOGEN, URINE: 0.2 E.U./DL
WBC # BLD: 5.1 K/UL (ref 4–11)
WBC UA: ABNORMAL /HPF (ref 0–5)

## 2020-02-25 PROCEDURE — 80048 BASIC METABOLIC PNL TOTAL CA: CPT

## 2020-02-25 PROCEDURE — 87086 URINE CULTURE/COLONY COUNT: CPT

## 2020-02-25 PROCEDURE — 82040 ASSAY OF SERUM ALBUMIN: CPT

## 2020-02-25 PROCEDURE — 36415 COLL VENOUS BLD VENIPUNCTURE: CPT

## 2020-02-25 PROCEDURE — 85730 THROMBOPLASTIN TIME PARTIAL: CPT

## 2020-02-25 PROCEDURE — 86850 RBC ANTIBODY SCREEN: CPT

## 2020-02-25 PROCEDURE — 87077 CULTURE AEROBIC IDENTIFY: CPT

## 2020-02-25 PROCEDURE — 84466 ASSAY OF TRANSFERRIN: CPT

## 2020-02-25 PROCEDURE — 86900 BLOOD TYPING SEROLOGIC ABO: CPT

## 2020-02-25 PROCEDURE — 86901 BLOOD TYPING SEROLOGIC RH(D): CPT

## 2020-02-25 PROCEDURE — 81001 URINALYSIS AUTO W/SCOPE: CPT

## 2020-02-25 PROCEDURE — 83036 HEMOGLOBIN GLYCOSYLATED A1C: CPT

## 2020-02-25 PROCEDURE — 85025 COMPLETE CBC W/AUTO DIFF WBC: CPT

## 2020-02-25 PROCEDURE — 85610 PROTHROMBIN TIME: CPT

## 2020-02-25 PROCEDURE — 93005 ELECTROCARDIOGRAM TRACING: CPT | Performed by: ORTHOPAEDIC SURGERY

## 2020-02-26 LAB
EKG ATRIAL RATE: 59 BPM
EKG DIAGNOSIS: NORMAL
EKG P AXIS: 8 DEGREES
EKG P-R INTERVAL: 134 MS
EKG Q-T INTERVAL: 440 MS
EKG QRS DURATION: 114 MS
EKG QTC CALCULATION (BAZETT): 435 MS
EKG R AXIS: -70 DEGREES
EKG T AXIS: 23 DEGREES
EKG VENTRICULAR RATE: 59 BPM
ESTIMATED AVERAGE GLUCOSE: 102.5 MG/DL
HBA1C MFR BLD: 5.2 %
ORGANISM: ABNORMAL
URINE CULTURE, ROUTINE: ABNORMAL
URINE CULTURE, ROUTINE: ABNORMAL

## 2020-02-26 PROCEDURE — 93010 ELECTROCARDIOGRAM REPORT: CPT | Performed by: INTERNAL MEDICINE

## 2020-02-27 ENCOUNTER — TELEPHONE (OUTPATIENT)
Dept: INTERNAL MEDICINE CLINIC | Age: 85
End: 2020-02-27

## 2020-03-02 ENCOUNTER — TELEPHONE (OUTPATIENT)
Dept: ORTHOPEDIC SURGERY | Age: 85
End: 2020-03-02

## 2020-03-02 NOTE — PROGRESS NOTES
Obstructive Sleep Apnea (SHIRA) Screening     Patient:  Татьяна Kaplan    YOB: 1935      Medical Record #:  [id number]                     Date:  3/2/2020     1. Are you a loud and/or regular snorer? []  Yes       [x] No    2. Have you been observed to gasp or stop breathing during sleep? []  Yes       [x] No    3. Do you feel tired or groggy upon awakening or do you awaken with a headache?           []  Yes       [x] No    4. Are you often tired or fatigued during the wake time hours? []  Yes       [x] No    5. Do you fall asleep sitting, reading, watching TV or driving? []  Yes       [x] No    6. Do you often have problems with memory or concentration? []  Yes       [x] No    **If patient's score is ? 3 they are considered high risk for SHIRA. An Anesthesia provider will evaluate the patient and develop a plan of care the day of surgery. Note:  If the patient's BMI is more than 35 kg m¯² , has neck circumference > 40 cm, and/or high blood pressure the risk is greater (© American Sleep Apnea Association, 2006).

## 2020-03-04 ENCOUNTER — OFFICE VISIT (OUTPATIENT)
Dept: INTERNAL MEDICINE CLINIC | Age: 85
End: 2020-03-04
Payer: MEDICARE

## 2020-03-04 VITALS
SYSTOLIC BLOOD PRESSURE: 132 MMHG | HEIGHT: 66 IN | HEART RATE: 58 BPM | BODY MASS INDEX: 19.13 KG/M2 | DIASTOLIC BLOOD PRESSURE: 84 MMHG | OXYGEN SATURATION: 91 % | WEIGHT: 119 LBS

## 2020-03-04 PROCEDURE — G8482 FLU IMMUNIZE ORDER/ADMIN: HCPCS | Performed by: INTERNAL MEDICINE

## 2020-03-04 PROCEDURE — 1090F PRES/ABSN URINE INCON ASSESS: CPT | Performed by: INTERNAL MEDICINE

## 2020-03-04 PROCEDURE — 1123F ACP DISCUSS/DSCN MKR DOCD: CPT | Performed by: INTERNAL MEDICINE

## 2020-03-04 PROCEDURE — 99214 OFFICE O/P EST MOD 30 MIN: CPT | Performed by: INTERNAL MEDICINE

## 2020-03-04 PROCEDURE — G8427 DOCREV CUR MEDS BY ELIG CLIN: HCPCS | Performed by: INTERNAL MEDICINE

## 2020-03-04 PROCEDURE — G8420 CALC BMI NORM PARAMETERS: HCPCS | Performed by: INTERNAL MEDICINE

## 2020-03-04 PROCEDURE — 4040F PNEUMOC VAC/ADMIN/RCVD: CPT | Performed by: INTERNAL MEDICINE

## 2020-03-04 PROCEDURE — 1036F TOBACCO NON-USER: CPT | Performed by: INTERNAL MEDICINE

## 2020-03-04 PROCEDURE — G8399 PT W/DXA RESULTS DOCUMENT: HCPCS | Performed by: INTERNAL MEDICINE

## 2020-03-04 RX ORDER — AMLODIPINE BESYLATE AND BENAZEPRIL HYDROCHLORIDE 5; 20 MG/1; MG/1
CAPSULE ORAL
Qty: 90 CAPSULE | Refills: 0 | Status: SHIPPED | OUTPATIENT
Start: 2020-03-04 | End: 2020-05-21 | Stop reason: SDUPTHER

## 2020-03-04 RX ORDER — ALBUTEROL SULFATE 90 UG/1
2 AEROSOL, METERED RESPIRATORY (INHALATION) EVERY 6 HOURS PRN
Qty: 1 INHALER | Refills: 1 | Status: SHIPPED | OUTPATIENT
Start: 2020-03-04 | End: 2022-06-29 | Stop reason: SDUPTHER

## 2020-03-04 ASSESSMENT — PATIENT HEALTH QUESTIONNAIRE - PHQ9
SUM OF ALL RESPONSES TO PHQ QUESTIONS 1-9: 1
1. LITTLE INTEREST OR PLEASURE IN DOING THINGS: 0
2. FEELING DOWN, DEPRESSED OR HOPELESS: 1
SUM OF ALL RESPONSES TO PHQ9 QUESTIONS 1 & 2: 1
SUM OF ALL RESPONSES TO PHQ QUESTIONS 1-9: 1

## 2020-03-04 NOTE — PROGRESS NOTES
PREOPERATIVE CONSULTATION      Bruce Florence  YOB: 1935    Date of Service:  3/4/2020    Vitals:    03/04/20 1112   BP: 132/84   Site: Left Upper Arm   Position: Sitting   Cuff Size: Small Adult   Pulse: 58   SpO2: 91%   Weight: 119 lb (54 kg)   Height: 5' 6\" (1.676 m)      Wt Readings from Last 2 Encounters:   03/04/20 119 lb (54 kg)   02/24/20 115 lb 15.4 oz (52.6 kg)     BP Readings from Last 3 Encounters:   03/04/20 132/84   11/14/19 130/82   07/25/19 138/84        Chief Complaint   Patient presents with   [facility] Pre-op Exam     Right knee replacement/ 3/10/20/ [facility] with Dr. Susana Colby     Allergies   Allergen Reactions    Hydrochlorothiazide      Constipation and severe leg cramps     Outpatient Medications Marked as Taking for the 3/4/20 encounter (Office Visit) with Carmelo Harris MD   Medication Sig Dispense Refill    amLODIPine-benazepril (LOTREL) 5-20 MG per capsule TAKE 1 CAPSULE BY MOUTH EVERY DAY (Patient taking differently: Take 1 capsule by mouth daily ) 30 capsule 0    SENNA CO Take 1 tablet by mouth as needed       magnesium hydroxide (MILK OF MAGNESIA) 400 MG/5ML suspension Take 30 mLs by mouth daily as needed for Constipation 1 Bottle 0    Misc. Devices (ROLLER WALKER) MISC 1 each by Does not apply route daily 1 each 0    fluticasone (FLOVENT HFA) 110 MCG/ACT inhaler INHALE 1 PUFF INTO THE LUNGS 2 TIMES DAILY. 1 Inhaler 5       This patient presents to the office today for a preoperative consultation at the request of surgeon, Dr. Susana Colby, who plans on performing RIGHT KNEE REPLACEMENT on March 10 at [facility].  The current problem began many years and symptoms have been worsening with time. Conservative therapy: Yes: injection, which has been not very effective. .    Planned anesthesia: General   Known anesthesia problems: None   Bleeding risk: No recent or remote history of abnormal bleeding  Personal or FH of DVT/PE: No    Patient objection to receiving blood products: No    Patient Active Problem List   Diagnosis    Carpal tunnel syndrome of left wrist    Mild intermittent asthma    Essential hypertension    Goiter    Vitamin D deficiency    Staghorn calculus    Biceps rupture, proximal    Incomplete tear of right rotator cuff    History of nonadherence to medical treatment    B12 deficiency    Primary osteoarthritis of both knees    Irritable bowel syndrome with constipation    Environmental and seasonal allergies    Weakness generalized       Past Medical History:   Diagnosis Date    Anxiety 7/20/2012    Arthritis     Asthma     Carpal tunnel syndrome of left wrist 10/6/2011    Depression 5/2/2012    GERD (gastroesophageal reflux disease) 9/4/2013    Hematuria     chronic from stone    History of nonadherence to medical treatment 10/11/2014    Hypertension     Kidney stone     saw Dr Shawn Pacheco - 6 cm stone    Thyroid disease     goiter    Tight ring on finger     unable to remove - left hand    Wears dentures     full upper    Wears glasses     for reading     Past Surgical History:   Procedure Laterality Date    CARPAL TUNNEL RELEASE Right 09/14/2011    OPEN REDUCTION INTERNAL FIXATION RIGHT WRIST (RUSSO'S FRACTURE)    HYSTERECTOMY       Family History   Problem Relation Age of Onset    Other Mother         scleroderma    Heart Disease Father         MI    Heart Attack Son 43    Diabetes Son      Social History     Socioeconomic History    Marital status:       Spouse name: Not on file    Number of children: Not on file    Years of education: Not on file    Highest education level: Not on file   Occupational History    Not on file   Social Needs    Financial resource strain: Not on file    Food insecurity:     Worry: Not on file     Inability: Not on file    Transportation needs:     Medical: Not on file     Non-medical: Not on file   Tobacco Use    Smoking status: Never Smoker    Smokeless tobacco: Never Used pre-operative beta-blocker use:    · Vascular surgery with history of postitive stress test  · Intermediate or high risk surgery with history of CAD   · Intermediate or high risk surgery with multiple clinical predictors of CAD- 2 of the following: history of compensated or prior heart failure, history of cerebrovascular disease, DM, or renal insufficiency    Routine administration of higher-dose, long-acting metoprolol in beta-blocker-naïve patients on the day of surgery, and in the absence of dose titration is associated with an overall increase in mortality. Beta-blockers should be started days to weeks prior to surgery and titrated to pulse < 70.  4. Deep vein thrombosis prophylaxis: regimen to be chosen by surgical team  5. No contraindications to planned surgery    Pita Nava was seen today for pre-op exam.    Diagnoses and all orders for this visit:    Preop exam for internal medicine    Primary osteoarthritis of right knee    Essential hypertension  -     amLODIPine-benazepril (LOTREL) 5-20 MG per capsule; TAKE 1 CAPSULE BY MOUTH EVERY DAY    Mild intermittent asthma without complication  -     albuterol sulfate  (90 Base) MCG/ACT inhaler;  Inhale 2 puffs into the lungs every 6 hours as needed for Wheezing              MARIAH CORONEL M.D.  [facility]  Office: [phone]  Fax: [phone]

## 2020-03-09 ENCOUNTER — ANESTHESIA EVENT (OUTPATIENT)
Dept: OPERATING ROOM | Age: 85
DRG: 470 | End: 2020-03-09
Payer: MEDICARE

## 2020-03-10 ENCOUNTER — ANESTHESIA (OUTPATIENT)
Dept: OPERATING ROOM | Age: 85
DRG: 470 | End: 2020-03-10
Payer: MEDICARE

## 2020-03-10 ENCOUNTER — HOSPITAL ENCOUNTER (INPATIENT)
Age: 85
LOS: 4 days | Discharge: NURSING, REHAB OR LONG-TERM CARE | DRG: 470 | End: 2020-03-14
Attending: ORTHOPAEDIC SURGERY | Admitting: ORTHOPAEDIC SURGERY
Payer: MEDICARE

## 2020-03-10 ENCOUNTER — APPOINTMENT (OUTPATIENT)
Dept: GENERAL RADIOLOGY | Age: 85
DRG: 470 | End: 2020-03-10
Attending: ORTHOPAEDIC SURGERY
Payer: MEDICARE

## 2020-03-10 VITALS — SYSTOLIC BLOOD PRESSURE: 95 MMHG | DIASTOLIC BLOOD PRESSURE: 81 MMHG | OXYGEN SATURATION: 97 %

## 2020-03-10 PROBLEM — Z96.651 STATUS POST TOTAL RIGHT KNEE REPLACEMENT: Status: ACTIVE | Noted: 2020-03-10

## 2020-03-10 PROBLEM — Z96.651 S/P TOTAL KNEE ARTHROPLASTY, RIGHT: Status: ACTIVE | Noted: 2020-03-10

## 2020-03-10 LAB
ABO/RH: NORMAL
ANTIBODY SCREEN: NORMAL

## 2020-03-10 PROCEDURE — 86923 COMPATIBILITY TEST ELECTRIC: CPT

## 2020-03-10 PROCEDURE — 2580000003 HC RX 258: Performed by: ANESTHESIOLOGY

## 2020-03-10 PROCEDURE — 1200000000 HC SEMI PRIVATE

## 2020-03-10 PROCEDURE — 7100000000 HC PACU RECOVERY - FIRST 15 MIN: Performed by: ORTHOPAEDIC SURGERY

## 2020-03-10 PROCEDURE — 6370000000 HC RX 637 (ALT 250 FOR IP): Performed by: ORTHOPAEDIC SURGERY

## 2020-03-10 PROCEDURE — 6360000002 HC RX W HCPCS: Performed by: ORTHOPAEDIC SURGERY

## 2020-03-10 PROCEDURE — C1776 JOINT DEVICE (IMPLANTABLE): HCPCS | Performed by: ORTHOPAEDIC SURGERY

## 2020-03-10 PROCEDURE — 6370000000 HC RX 637 (ALT 250 FOR IP): Performed by: PHYSICIAN ASSISTANT

## 2020-03-10 PROCEDURE — 2500000003 HC RX 250 WO HCPCS: Performed by: NURSE ANESTHETIST, CERTIFIED REGISTERED

## 2020-03-10 PROCEDURE — 2500000003 HC RX 250 WO HCPCS: Performed by: ORTHOPAEDIC SURGERY

## 2020-03-10 PROCEDURE — 2500000003 HC RX 250 WO HCPCS: Performed by: ANESTHESIOLOGY

## 2020-03-10 PROCEDURE — 97110 THERAPEUTIC EXERCISES: CPT

## 2020-03-10 PROCEDURE — 97162 PT EVAL MOD COMPLEX 30 MIN: CPT

## 2020-03-10 PROCEDURE — 6360000002 HC RX W HCPCS: Performed by: NURSE ANESTHETIST, CERTIFIED REGISTERED

## 2020-03-10 PROCEDURE — 3700000000 HC ANESTHESIA ATTENDED CARE: Performed by: ORTHOPAEDIC SURGERY

## 2020-03-10 PROCEDURE — 97535 SELF CARE MNGMENT TRAINING: CPT

## 2020-03-10 PROCEDURE — 86850 RBC ANTIBODY SCREEN: CPT

## 2020-03-10 PROCEDURE — 88305 TISSUE EXAM BY PATHOLOGIST: CPT

## 2020-03-10 PROCEDURE — 86900 BLOOD TYPING SEROLOGIC ABO: CPT

## 2020-03-10 PROCEDURE — 3600000005 HC SURGERY LEVEL 5 BASE: Performed by: ORTHOPAEDIC SURGERY

## 2020-03-10 PROCEDURE — 73560 X-RAY EXAM OF KNEE 1 OR 2: CPT

## 2020-03-10 PROCEDURE — 2700000000 HC OXYGEN THERAPY PER DAY

## 2020-03-10 PROCEDURE — 3E0T3BZ INTRODUCTION OF ANESTHETIC AGENT INTO PERIPHERAL NERVES AND PLEXI, PERCUTANEOUS APPROACH: ICD-10-PCS | Performed by: ANESTHESIOLOGY

## 2020-03-10 PROCEDURE — C1713 ANCHOR/SCREW BN/BN,TIS/BN: HCPCS | Performed by: ORTHOPAEDIC SURGERY

## 2020-03-10 PROCEDURE — 6370000000 HC RX 637 (ALT 250 FOR IP): Performed by: ANESTHESIOLOGY

## 2020-03-10 PROCEDURE — 0SRC069 REPLACEMENT OF RIGHT KNEE JOINT WITH OXIDIZED ZIRCONIUM ON POLYETHYLENE SYNTHETIC SUBSTITUTE, CEMENTED, OPEN APPROACH: ICD-10-PCS | Performed by: ORTHOPAEDIC SURGERY

## 2020-03-10 PROCEDURE — 64447 NJX AA&/STRD FEMORAL NRV IMG: CPT | Performed by: ANESTHESIOLOGY

## 2020-03-10 PROCEDURE — 2580000003 HC RX 258: Performed by: PHYSICIAN ASSISTANT

## 2020-03-10 PROCEDURE — 2709999900 HC NON-CHARGEABLE SUPPLY: Performed by: ORTHOPAEDIC SURGERY

## 2020-03-10 PROCEDURE — 94761 N-INVAS EAR/PLS OXIMETRY MLT: CPT

## 2020-03-10 PROCEDURE — 7100000001 HC PACU RECOVERY - ADDTL 15 MIN: Performed by: ORTHOPAEDIC SURGERY

## 2020-03-10 PROCEDURE — P9016 RBC LEUKOCYTES REDUCED: HCPCS

## 2020-03-10 PROCEDURE — 97530 THERAPEUTIC ACTIVITIES: CPT

## 2020-03-10 PROCEDURE — 6360000002 HC RX W HCPCS: Performed by: PHYSICIAN ASSISTANT

## 2020-03-10 PROCEDURE — C9290 INJ, BUPIVACAINE LIPOSOME: HCPCS | Performed by: ORTHOPAEDIC SURGERY

## 2020-03-10 PROCEDURE — 2580000003 HC RX 258: Performed by: ORTHOPAEDIC SURGERY

## 2020-03-10 PROCEDURE — 88311 DECALCIFY TISSUE: CPT

## 2020-03-10 PROCEDURE — 86901 BLOOD TYPING SEROLOGIC RH(D): CPT

## 2020-03-10 PROCEDURE — 97166 OT EVAL MOD COMPLEX 45 MIN: CPT

## 2020-03-10 PROCEDURE — 3600000015 HC SURGERY LEVEL 5 ADDTL 15MIN: Performed by: ORTHOPAEDIC SURGERY

## 2020-03-10 PROCEDURE — 3700000001 HC ADD 15 MINUTES (ANESTHESIA): Performed by: ORTHOPAEDIC SURGERY

## 2020-03-10 DEVICE — GENESIS II POSTERIOR STABILIZED                                    HIGH FLEXION INSERT SIZE 3-4 9MM
Type: IMPLANTABLE DEVICE | Site: KNEE | Status: FUNCTIONAL
Brand: GENESIS II

## 2020-03-10 DEVICE — GENESIS II RESURFACING PATELLAR                                    PROSTHESIS  32MM
Type: IMPLANTABLE DEVICE | Site: KNEE | Status: FUNCTIONAL
Brand: GENESIS II

## 2020-03-10 DEVICE — RALLY HV BONE CEMENT 40 GRAMS
Type: IMPLANTABLE DEVICE | Site: KNEE | Status: FUNCTIONAL
Brand: RALLY

## 2020-03-10 DEVICE — GENESIS II NON-POROUS TIBIAL                                    BASEPLATE SIZE 4 RIGHT
Type: IMPLANTABLE DEVICE | Site: KNEE | Status: FUNCTIONAL
Brand: GENESIS II

## 2020-03-10 DEVICE — LEGION POSTERIOR STABILIZED                                    NONPOROUS FEMORAL SIZE 5 RIGHT
Type: IMPLANTABLE DEVICE | Site: KNEE | Status: FUNCTIONAL
Brand: LEGION

## 2020-03-10 RX ORDER — DIPHENHYDRAMINE HYDROCHLORIDE 50 MG/ML
6.25 INJECTION INTRAMUSCULAR; INTRAVENOUS
Status: DISCONTINUED | OUTPATIENT
Start: 2020-03-10 | End: 2020-03-10 | Stop reason: HOSPADM

## 2020-03-10 RX ORDER — OXYCODONE HYDROCHLORIDE AND ACETAMINOPHEN 5; 325 MG/1; MG/1
2 TABLET ORAL PRN
Status: DISCONTINUED | OUTPATIENT
Start: 2020-03-10 | End: 2020-03-10 | Stop reason: HOSPADM

## 2020-03-10 RX ORDER — DEXAMETHASONE SODIUM PHOSPHATE 4 MG/ML
INJECTION, SOLUTION INTRA-ARTICULAR; INTRALESIONAL; INTRAMUSCULAR; INTRAVENOUS; SOFT TISSUE PRN
Status: DISCONTINUED | OUTPATIENT
Start: 2020-03-10 | End: 2020-03-10 | Stop reason: SDUPTHER

## 2020-03-10 RX ORDER — SODIUM CHLORIDE 0.9 % (FLUSH) 0.9 %
10 SYRINGE (ML) INJECTION PRN
Status: DISCONTINUED | OUTPATIENT
Start: 2020-03-10 | End: 2020-03-10 | Stop reason: HOSPADM

## 2020-03-10 RX ORDER — SODIUM CHLORIDE, SODIUM LACTATE, POTASSIUM CHLORIDE, CALCIUM CHLORIDE 600; 310; 30; 20 MG/100ML; MG/100ML; MG/100ML; MG/100ML
INJECTION, SOLUTION INTRAVENOUS CONTINUOUS
Status: DISCONTINUED | OUTPATIENT
Start: 2020-03-10 | End: 2020-03-10

## 2020-03-10 RX ORDER — HYDRALAZINE HYDROCHLORIDE 20 MG/ML
5 INJECTION INTRAMUSCULAR; INTRAVENOUS EVERY 30 MIN PRN
Status: DISCONTINUED | OUTPATIENT
Start: 2020-03-10 | End: 2020-03-10 | Stop reason: HOSPADM

## 2020-03-10 RX ORDER — ALBUTEROL SULFATE 90 UG/1
2 AEROSOL, METERED RESPIRATORY (INHALATION) EVERY 6 HOURS PRN
Status: DISCONTINUED | OUTPATIENT
Start: 2020-03-10 | End: 2020-03-14 | Stop reason: HOSPADM

## 2020-03-10 RX ORDER — SODIUM CHLORIDE 0.9 % (FLUSH) 0.9 %
10 SYRINGE (ML) INJECTION EVERY 12 HOURS SCHEDULED
Status: DISCONTINUED | OUTPATIENT
Start: 2020-03-10 | End: 2020-03-10 | Stop reason: HOSPADM

## 2020-03-10 RX ORDER — OXYCODONE HYDROCHLORIDE 5 MG/1
10 TABLET ORAL EVERY 4 HOURS PRN
Status: CANCELLED | OUTPATIENT
Start: 2020-03-10

## 2020-03-10 RX ORDER — PROPOFOL 10 MG/ML
INJECTION, EMULSION INTRAVENOUS CONTINUOUS PRN
Status: DISCONTINUED | OUTPATIENT
Start: 2020-03-10 | End: 2020-03-10 | Stop reason: SDUPTHER

## 2020-03-10 RX ORDER — MORPHINE SULFATE 4 MG/ML
4 INJECTION, SOLUTION INTRAMUSCULAR; INTRAVENOUS
Status: CANCELLED | OUTPATIENT
Start: 2020-03-10

## 2020-03-10 RX ORDER — BUPIVACAINE HYDROCHLORIDE 7.5 MG/ML
INJECTION, SOLUTION INTRASPINAL PRN
Status: DISCONTINUED | OUTPATIENT
Start: 2020-03-10 | End: 2020-03-10 | Stop reason: SDUPTHER

## 2020-03-10 RX ORDER — ONDANSETRON 2 MG/ML
4 INJECTION INTRAMUSCULAR; INTRAVENOUS EVERY 30 MIN PRN
Status: DISCONTINUED | OUTPATIENT
Start: 2020-03-10 | End: 2020-03-10 | Stop reason: HOSPADM

## 2020-03-10 RX ORDER — OXYCODONE HYDROCHLORIDE 5 MG/1
5 TABLET ORAL EVERY 4 HOURS PRN
Status: CANCELLED | OUTPATIENT
Start: 2020-03-10

## 2020-03-10 RX ORDER — FENTANYL CITRATE 50 UG/ML
INJECTION, SOLUTION INTRAMUSCULAR; INTRAVENOUS PRN
Status: DISCONTINUED | OUTPATIENT
Start: 2020-03-10 | End: 2020-03-10 | Stop reason: SDUPTHER

## 2020-03-10 RX ORDER — LABETALOL HYDROCHLORIDE 5 MG/ML
5 INJECTION, SOLUTION INTRAVENOUS
Status: DISCONTINUED | OUTPATIENT
Start: 2020-03-10 | End: 2020-03-10 | Stop reason: HOSPADM

## 2020-03-10 RX ORDER — ACETAMINOPHEN AND CODEINE PHOSPHATE 300; 30 MG/1; MG/1
1 TABLET ORAL EVERY 4 HOURS PRN
Status: DISCONTINUED | OUTPATIENT
Start: 2020-03-10 | End: 2020-03-14 | Stop reason: HOSPADM

## 2020-03-10 RX ORDER — AMLODIPINE BESYLATE AND BENAZEPRIL HYDROCHLORIDE 5; 20 MG/1; MG/1
1 CAPSULE ORAL DAILY
Status: DISCONTINUED | OUTPATIENT
Start: 2020-03-10 | End: 2020-03-10 | Stop reason: CLARIF

## 2020-03-10 RX ORDER — 0.9 % SODIUM CHLORIDE 0.9 %
500 INTRAVENOUS SOLUTION INTRAVENOUS ONCE
Status: COMPLETED | OUTPATIENT
Start: 2020-03-11 | End: 2020-03-11

## 2020-03-10 RX ORDER — ACETAMINOPHEN 325 MG/1
650 TABLET ORAL EVERY 6 HOURS
Status: DISCONTINUED | OUTPATIENT
Start: 2020-03-10 | End: 2020-03-11

## 2020-03-10 RX ORDER — LIDOCAINE HYDROCHLORIDE 20 MG/ML
INJECTION, SOLUTION INFILTRATION; PERINEURAL PRN
Status: DISCONTINUED | OUTPATIENT
Start: 2020-03-10 | End: 2020-03-10 | Stop reason: SDUPTHER

## 2020-03-10 RX ORDER — EPHEDRINE SULFATE 50 MG/ML
INJECTION INTRAVENOUS PRN
Status: DISCONTINUED | OUTPATIENT
Start: 2020-03-10 | End: 2020-03-10 | Stop reason: SDUPTHER

## 2020-03-10 RX ORDER — AMLODIPINE BESYLATE 5 MG/1
5 TABLET ORAL DAILY
Status: DISCONTINUED | OUTPATIENT
Start: 2020-03-10 | End: 2020-03-14 | Stop reason: HOSPADM

## 2020-03-10 RX ORDER — ACETAMINOPHEN 500 MG
1000 TABLET ORAL ONCE
Status: COMPLETED | OUTPATIENT
Start: 2020-03-10 | End: 2020-03-10

## 2020-03-10 RX ORDER — OXYCODONE HYDROCHLORIDE AND ACETAMINOPHEN 5; 325 MG/1; MG/1
1 TABLET ORAL PRN
Status: DISCONTINUED | OUTPATIENT
Start: 2020-03-10 | End: 2020-03-10 | Stop reason: HOSPADM

## 2020-03-10 RX ORDER — LISINOPRIL 20 MG/1
20 TABLET ORAL DAILY
Status: DISCONTINUED | OUTPATIENT
Start: 2020-03-10 | End: 2020-03-14 | Stop reason: HOSPADM

## 2020-03-10 RX ORDER — LIDOCAINE HYDROCHLORIDE 10 MG/ML
INJECTION, SOLUTION INFILTRATION; PERINEURAL PRN
Status: DISCONTINUED | OUTPATIENT
Start: 2020-03-10 | End: 2020-03-10 | Stop reason: SDUPTHER

## 2020-03-10 RX ORDER — SODIUM CHLORIDE 0.9 % (FLUSH) 0.9 %
10 SYRINGE (ML) INJECTION PRN
Status: DISCONTINUED | OUTPATIENT
Start: 2020-03-10 | End: 2020-03-14 | Stop reason: HOSPADM

## 2020-03-10 RX ORDER — PROPOFOL 10 MG/ML
INJECTION, EMULSION INTRAVENOUS PRN
Status: DISCONTINUED | OUTPATIENT
Start: 2020-03-10 | End: 2020-03-10 | Stop reason: SDUPTHER

## 2020-03-10 RX ORDER — SODIUM CHLORIDE 9 MG/ML
INJECTION, SOLUTION INTRAVENOUS CONTINUOUS
Status: DISCONTINUED | OUTPATIENT
Start: 2020-03-10 | End: 2020-03-14 | Stop reason: HOSPADM

## 2020-03-10 RX ORDER — SENNA AND DOCUSATE SODIUM 50; 8.6 MG/1; MG/1
1 TABLET, FILM COATED ORAL 2 TIMES DAILY
Status: DISCONTINUED | OUTPATIENT
Start: 2020-03-10 | End: 2020-03-14 | Stop reason: HOSPADM

## 2020-03-10 RX ORDER — ALBUTEROL SULFATE 90 UG/1
2 AEROSOL, METERED RESPIRATORY (INHALATION) EVERY 6 HOURS PRN
Status: DISCONTINUED | OUTPATIENT
Start: 2020-03-10 | End: 2020-03-10

## 2020-03-10 RX ORDER — SODIUM CHLORIDE 0.9 % (FLUSH) 0.9 %
10 SYRINGE (ML) INJECTION EVERY 12 HOURS SCHEDULED
Status: DISCONTINUED | OUTPATIENT
Start: 2020-03-10 | End: 2020-03-14 | Stop reason: HOSPADM

## 2020-03-10 RX ORDER — MORPHINE SULFATE 2 MG/ML
2 INJECTION, SOLUTION INTRAMUSCULAR; INTRAVENOUS
Status: CANCELLED | OUTPATIENT
Start: 2020-03-10

## 2020-03-10 RX ORDER — LIDOCAINE HYDROCHLORIDE 10 MG/ML
1 INJECTION, SOLUTION EPIDURAL; INFILTRATION; INTRACAUDAL; PERINEURAL
Status: DISCONTINUED | OUTPATIENT
Start: 2020-03-10 | End: 2020-03-10 | Stop reason: HOSPADM

## 2020-03-10 RX ORDER — FENTANYL CITRATE 50 UG/ML
25 INJECTION, SOLUTION INTRAMUSCULAR; INTRAVENOUS
Status: DISCONTINUED | OUTPATIENT
Start: 2020-03-10 | End: 2020-03-10 | Stop reason: HOSPADM

## 2020-03-10 RX ADMIN — SODIUM CHLORIDE: 9 INJECTION, SOLUTION INTRAVENOUS at 19:01

## 2020-03-10 RX ADMIN — LIDOCAINE HYDROCHLORIDE 2 ML: 10 INJECTION, SOLUTION INFILTRATION; PERINEURAL at 10:15

## 2020-03-10 RX ADMIN — DEXAMETHASONE SODIUM PHOSPHATE 8 MG: 4 INJECTION, SOLUTION INTRAMUSCULAR; INTRAVENOUS at 10:22

## 2020-03-10 RX ADMIN — CEFAZOLIN SODIUM 2 G: 10 INJECTION, POWDER, FOR SOLUTION INTRAVENOUS at 10:30

## 2020-03-10 RX ADMIN — SODIUM CHLORIDE, POTASSIUM CHLORIDE, SODIUM LACTATE AND CALCIUM CHLORIDE: 600; 310; 30; 20 INJECTION, SOLUTION INTRAVENOUS at 10:03

## 2020-03-10 RX ADMIN — LIDOCAINE HYDROCHLORIDE 60 MG: 20 INJECTION, SOLUTION INFILTRATION; PERINEURAL at 10:22

## 2020-03-10 RX ADMIN — ACETAMINOPHEN 650 MG: 325 TABLET ORAL at 19:01

## 2020-03-10 RX ADMIN — SODIUM CHLORIDE, POTASSIUM CHLORIDE, SODIUM LACTATE AND CALCIUM CHLORIDE: 600; 310; 30; 20 INJECTION, SOLUTION INTRAVENOUS at 09:17

## 2020-03-10 RX ADMIN — BUPIVACAINE HYDROCHLORIDE 1.4 ML: 7.5 INJECTION, SOLUTION SUBARACHNOID at 10:19

## 2020-03-10 RX ADMIN — PROPOFOL 10 MCG/KG/MIN: 10 INJECTION, EMULSION INTRAVENOUS at 10:22

## 2020-03-10 RX ADMIN — PROPOFOL 100 MG: 10 INJECTION, EMULSION INTRAVENOUS at 10:39

## 2020-03-10 RX ADMIN — EPHEDRINE SULFATE 10 MG: 50 INJECTION INTRAVENOUS at 11:17

## 2020-03-10 RX ADMIN — EPHEDRINE SULFATE 5 MG: 50 INJECTION INTRAVENOUS at 11:03

## 2020-03-10 RX ADMIN — FENTANYL CITRATE 25 MCG: 50 INJECTION INTRAMUSCULAR; INTRAVENOUS at 10:54

## 2020-03-10 RX ADMIN — FENTANYL CITRATE 50 MCG: 50 INJECTION INTRAMUSCULAR; INTRAVENOUS at 10:39

## 2020-03-10 RX ADMIN — CEFAZOLIN SODIUM 2 G: 10 INJECTION, POWDER, FOR SOLUTION INTRAVENOUS at 19:01

## 2020-03-10 RX ADMIN — ACETAMINOPHEN AND CODEINE PHOSPHATE 1 TABLET: 300; 30 TABLET ORAL at 22:34

## 2020-03-10 RX ADMIN — LABETALOL HYDROCHLORIDE 5 MG: 5 INJECTION INTRAVENOUS at 16:20

## 2020-03-10 RX ADMIN — PROPOFOL 20 MG: 10 INJECTION, EMULSION INTRAVENOUS at 10:22

## 2020-03-10 RX ADMIN — ACETAMINOPHEN 1000 MG: 500 TABLET ORAL at 09:15

## 2020-03-10 RX ADMIN — LIDOCAINE HYDROCHLORIDE 2 ML: 10 INJECTION, SOLUTION INFILTRATION; PERINEURAL at 10:18

## 2020-03-10 ASSESSMENT — PULMONARY FUNCTION TESTS
PIF_VALUE: 1
PIF_VALUE: 3
PIF_VALUE: 16
PIF_VALUE: 0
PIF_VALUE: 0
PIF_VALUE: 12
PIF_VALUE: 1
PIF_VALUE: 0
PIF_VALUE: 2
PIF_VALUE: 3
PIF_VALUE: 0
PIF_VALUE: 3
PIF_VALUE: 0
PIF_VALUE: 3
PIF_VALUE: 0
PIF_VALUE: 16
PIF_VALUE: 2
PIF_VALUE: 0
PIF_VALUE: 1
PIF_VALUE: 0
PIF_VALUE: 3
PIF_VALUE: 0
PIF_VALUE: 16
PIF_VALUE: 16
PIF_VALUE: 3
PIF_VALUE: 3
PIF_VALUE: 18
PIF_VALUE: 16
PIF_VALUE: 3
PIF_VALUE: 2
PIF_VALUE: 1
PIF_VALUE: 3
PIF_VALUE: 16
PIF_VALUE: 17
PIF_VALUE: 0
PIF_VALUE: 3
PIF_VALUE: 0
PIF_VALUE: 3
PIF_VALUE: 3
PIF_VALUE: 2
PIF_VALUE: 0
PIF_VALUE: 2
PIF_VALUE: 0
PIF_VALUE: 0
PIF_VALUE: 2
PIF_VALUE: 0
PIF_VALUE: 0
PIF_VALUE: 3
PIF_VALUE: 3
PIF_VALUE: 17
PIF_VALUE: 3
PIF_VALUE: 18
PIF_VALUE: 0
PIF_VALUE: 0
PIF_VALUE: 3
PIF_VALUE: 2
PIF_VALUE: 0
PIF_VALUE: 0
PIF_VALUE: 3
PIF_VALUE: 3
PIF_VALUE: 0
PIF_VALUE: 1
PIF_VALUE: 0
PIF_VALUE: 2
PIF_VALUE: 23
PIF_VALUE: 3
PIF_VALUE: 3
PIF_VALUE: 2
PIF_VALUE: 3
PIF_VALUE: 0
PIF_VALUE: 3
PIF_VALUE: 16
PIF_VALUE: 13
PIF_VALUE: 2

## 2020-03-10 ASSESSMENT — PAIN SCALES - GENERAL
PAINLEVEL_OUTOF10: 0
PAINLEVEL_OUTOF10: 0
PAINLEVEL_OUTOF10: 6
PAINLEVEL_OUTOF10: 3
PAINLEVEL_OUTOF10: 10

## 2020-03-10 ASSESSMENT — PAIN - FUNCTIONAL ASSESSMENT: PAIN_FUNCTIONAL_ASSESSMENT: 0-10

## 2020-03-10 NOTE — PROGRESS NOTES
4 Eyes Skin Assessment     The patient is being assess for   Post-Op Surgical    I agree that 2 RN's have performed a thorough Head to Toe Skin Assessment on the patient. ALL assessment sites listed below have been assessed. Areas assessed by both nurses:   [x]   Head, Face, and Ears   [x]   Shoulders, Back, and Chest, Abdomen  [x]   Arms, Elbows, and Hands   [x]   Coccyx, Sacrum, and Ischium  [x]   Legs, Feet, and Heels        Red, blanchable area to right buttock. **SHARE this note so that the co-signing nurse is able to place an eSignature**    Co-signer eSignature: Electronically signed by Brigido Eric RN on 3/10/20 at 5:37 PM EDT    Does the Patient have Skin Breakdown?   No          Oscar Prevention initiated:  Yes   Wound Care Orders initiated:  No      WOC nurse consulted for Pressure Injury (Stage 3,4, Unstageable, DTI, NWPT, Complex wounds)and New or Established Ostomies:  No      Primary Nurse eSignature: Electronically signed by Casper Gerber RN on 3/10/20 at 6:17 PM EDT

## 2020-03-10 NOTE — PROGRESS NOTES
Occupational Therapy   Occupational Therapy Initial Assessment and Treatment  Date: 3/10/2020   Patient Name: Tessie Martinez  MRN: [id number]     : 1935    Date of Service: 3/10/2020    Discharge Recommendations:  [address]  OT Equipment Recommendations  Equipment Needed: No  Other: defer    Assessment   Performance deficits / Impairments: Decreased functional mobility ; Decreased ADL status; Decreased endurance;Decreased balance;Decreased high-level IADLs;Decreased posture;Decreased strength;Decreased safe awareness  After evaluation and treatment, pt found to be presenting with the above mentioned deficits. Pt would benefit from continued skilled occupational therapy to address these deficits, increasing safety and independence with ADL and functional mobility. Session limited as pt becoming unresponsive after ~4 mins of static sitting EOB, trying to assess vitals. Session aborted due to medical concerns and RN addressing pt's elevated BP. Will continue to assess for discharge needs. Prognosis: Good  Decision Making: Medium Complexity  REQUIRES OT FOLLOW UP: Yes  Activity Tolerance  Activity Tolerance: Treatment limited secondary to medical complications (free text)  Activity Tolerance: Supine: 113/78, 92%, 91bpm. Upon sitting EOB, pt reporting mild dizziness. Attempted EOB BP - elevated and pt quickly becoming unresponsive. Pt returned to supine. RN present. BP checked in supine multiple sites/times and elevated (183/155). Session held. Safety Devices  Safety Devices in place: Yes  Type of devices: Call light within reach; Left in bed;Nurse notified         Patient Diagnosis(es): The encounter diagnosis was Primary osteoarthritis of right knee.      has a past medical history of Anxiety, Arthritis, Asthma, Carpal tunnel syndrome of left wrist, Depression, GERD (gastroesophageal reflux disease), Hematuria, History of nonadherence to medical treatment, Hypertension, Kidney stone,

## 2020-03-10 NOTE — PROGRESS NOTES
Training;Equipment;Weight-bearing Education;General Safety; Disease Specific Education;Home Exercise Program;Precautions;Orientation  Patient Education: Pt verbalizes understanding but requires reinforcement. REQUIRES PT FOLLOW UP: Yes  Activity Tolerance: Treatment limited secondary to medical complications (free text)  Activity Tolerance: Pt participation limited by decreased responsiveness at EOB. BP initially = 113/76 while supine, increasing to the 170s/110s at EOB and further increasing to as high as the low 200s/160s after returning to supine. PACU RN aware and present after therapy session to administer medication. Per chart review after therapy session, it appears pt's BP decreased to normal range after medication was given. Patient Diagnosis(es): The encounter diagnosis was Primary osteoarthritis of right knee. has a past medical history of Anxiety, Arthritis, Asthma, Carpal tunnel syndrome of left wrist, Depression, GERD (gastroesophageal reflux disease), Hematuria, History of nonadherence to medical treatment, Hypertension, Kidney stone, Thyroid disease, Tight ring on finger, Wears dentures, and Wears glasses. has a past surgical history that includes Hysterectomy and Carpal tunnel release (Right, 09/14/2011). Restrictions  Restrictions/Precautions  Restrictions/Precautions: Weight Bearing, General Precautions, Surgical Protocols, Fall Risk  Required Braces or Orthoses?: Yes(KI to RLE when OOB until (I) with SLR x 10 reps)  Lower Extremity Weight Bearing Restrictions  Right Lower Extremity Weight Bearing: Weight Bearing As Tolerated  Position Activity Restriction  Other position/activity restrictions: 1)  Dangle at edge of bed, progress to stand, and take a few steps with assistive device. 2)  Encourage ankle pumps and quad sets. 3)  Up in bedside chair as tolerated. 4)  Commode privileges with assistance.   Vision/Hearing  Vision: Impaired  Vision Exceptions: Cataracts(no glasses, pt reports mild trouble seeing distances)  Hearing: Exceptions to [facility]  Hearing Exceptions: Hard of hearing/hearing concerns     Subjective  General  Chart Reviewed: Yes  Patient assessed for rehabilitation services?: Yes  Family / Caregiver Present: No  Referring Practitioner: Dr. Manoj Gray and Van Shen PA-C  Referral Date : 03/10/20  Diagnosis: R knee OA, s/p R TKR on 3/10/20  Follows Commands: Within Functional Limits  General Comment  Comments: Pt resting in bed upon entry of therapy staff, evaluated in PACU  Subjective  Subjective: Pt agreeable to work with PT/OT this afternoon, pleasant and cooperative. C/o mild dizziness while sitting EOB. Pain Screening  Patient Currently in Pain: Denies(at rest and during ther ex)  Intervention List: Patient able to continue with treatment    Orientation  Orientation  Overall Orientation Status: Within Functional Limits     Social/Functional History  Social/Functional History  Lives With: Alone  Type of Home: Apartment  Home Layout: One level  Home Access: Stairs to enter with rails(1 JONN)  Bathroom Shower/Tub: Tub/Shower unit  Bathroom Toilet: Standard(toilet raiser with handles)  Home Equipment: 4 wheeled walker(transport chair, \"walker used as a bed rail\")  Bath: (Pt normally sponge bathes when aide present 1x/wk)  Grooming: Independent(\"It's hard. I rest on the counter. \")  Feeding: Independent  Toileting: Independent  Homemaking Responsibilities: No(aide completes cleaning and laundry, family completes grocery shopping)  Ambulation Assistance: Independent(with 8TI)  Transfer Assistance: Independent(\"I have trouble getting up from chairs. I usually only sit on my bed. \")  Active : No(grandson drives or she rides senior bus)  Occupation: Retired  Additional Comments: Aide comes 1x/wk. That is the only time she bathes and changes clothes.     Objective  Observation/Palpation  Posture: Fair  Observation: Kyphotic upper trunk, rounded shoulders, forward

## 2020-03-10 NOTE — ANESTHESIA PRE PROCEDURE
Department of Anesthesiology  Preprocedure Note       Name:  Rickey Murillo   Age:  80 y.o.  :  1935                                          MRN:  [id number]         Date:  3/10/2020      Surgeon: Dominick Paul):  Jose Alberto Fox MD    Procedure: RIGHT TOTAL KNEE REPLACEMENT       [employer] (Right Knee)    Medications prior to admission:   Prior to Admission medications    Medication Sig Start Date End Date Taking? Authorizing Provider   albuterol sulfate  (90 Base) MCG/ACT inhaler Inhale 2 puffs into the lungs every 6 hours as needed for Wheezing 3/4/20   Diane Harris MD   amLODIPine-benazepril (LOTREL) 5-20 MG per capsule TAKE 1 CAPSULE BY MOUTH EVERY DAY 3/4/20   Diane Harris MD   meloxicam (MOBIC) 15 MG tablet Take 1 tablet by mouth daily 12/4/19 3/3/20  Delia Reyna MD   SENNA CO Take 1 tablet by mouth as needed     Historical Provider, MD   Cholecalciferol ([employer] D3) 2000 units CAPS TAKE ONE CAPSULE BY MOUTH DAILY  Patient not taking: Reported on 3/4/2020 1/2/19   Delia Reyna MD   magnesium hydroxide (MILK OF MAGNESIA) 400 MG/5ML suspension Take 30 mLs by mouth daily as needed for Constipation 18   Delia Reyna MD   Misc.  Devices (ROLLER [place]) MISC 1 each by Does not apply route daily 18   Delia Reyna MD   aspirin 325 MG EC tablet Take 325 mg by mouth every 4 hours as needed     Historical Provider, MD       Current medications:    Current Facility-Administered Medications   Medication Dose Route Frequency Provider Last Rate Last Dose    ceFAZolin (ANCEF) 2 g in dextrose 5 % 100 mL IVPB  2 g Intravenous On Call to Xiomara Smith MD        lactated ringers infusion   Intravenous Continuous Calin Treadwell MD        lidocaine 1 % (PF) injection 0.1 mL  0.1 mL Intradermal Once PRN Calin Treadwell MD        lactated ringers infusion   Intravenous Continuous Pawelalex Martínez  mL/hr at 03/10/20 0917      sodium chloride flush 0.9 % injection 10 mL 02/25/2020    K 3.8 01/20/2018    CL 99 02/25/2020    CO2 26 02/25/2020    BUN 26 02/25/2020    CREATININE 0.9 02/25/2020    GFRAA >60 02/25/2020    GFRAA >60 01/16/2013    AGRATIO 1.7 09/08/2018    LABGLOM 60 02/25/2020    GLUCOSE 83 02/25/2020    PROT 6.7 09/08/2018    PROT 6.2 01/16/2013    CALCIUM 9.6 02/25/2020    BILITOT 0.3 09/08/2018    ALKPHOS 85 09/08/2018    AST 16 09/08/2018    ALT 13 09/08/2018       POC Tests: No results for input(s): POCGLU, POCNA, POCK, POCCL, POCBUN, POCHEMO, POCHCT in the last 72 hours. Coags:   Lab Results   Component Value Date    PROTIME 11.4 02/25/2020    INR 0.98 02/25/2020    APTT 32.8 02/25/2020       HCG (If Applicable): No results found for: PREGTESTUR, PREGSERUM, HCG, HCGQUANT     ABGs: No results found for: PHART, PO2ART, BFB5BUR, JFX2KOR, BEART, M6NYZUWI     Type & Screen (If Applicable):  No results found for: LABABO, [address]    Anesthesia Evaluation  Patient summary reviewed and Nursing notes reviewed no history of anesthetic complications:   Airway: Mallampati: II     Neck ROM: full   Dental:          Pulmonary:   (+) asthma:                            Cardiovascular:    (+) hypertension:,                   Neuro/Psych:   (+) neuromuscular disease:, psychiatric history:            GI/Hepatic/Renal:   (+) GERD:,           Endo/Other:                     Abdominal:           Vascular:                                        Anesthesia Plan      regional and spinal     ASA 4     (Medications & allergies reviewed  All available lab & EKG data reviewed  ACB for POA)  Induction: intravenous. Anesthetic plan and risks discussed with patient. Plan discussed with CRNA.                 Benjie De La Rosa MD   3/10/2020

## 2020-03-10 NOTE — OP NOTE
incision passing just  along the most medial side of the extensor mechanism and the vastus  medialis obliquus. The patella was then retracted laterally. At this  point, careful attention was made to removal of osteophytes and soft  tissue balancing, with removal of any adhesions, particularly around the  lateral peripatellar region. Utilizing the [employer] and [employer] protocol for the above-mentioned  prosthesis, the course of femoral and tibial cuts were made. Femoral  cuts were addressed first.  The distal cut first protocol was used with  the patient ultimately being sized for the aforementioned trial.  This  gave excellent fixation both in the anteroposterior and mediolateral  dimensions. Attention was then drawn toward the tibia. The preliminary cut was made  in the tibia, removing approximately 2 mm of bone based upon the  patients overall tibial anatomy. The trial tibia was sized and placed  in the aforementioned thickness and size as mentioned above. This gave  excellent position and range of motion after trial components were  utilized. Any further tissue balancing that was necessary was performed  at this juncture. The patella was addressed. Approximately 10 mm of bone was removed from  the patella using an oscillating saw. The onlay patellar component was  sized and appropriately positioned. The knee was then taken through a  trial range of motion with excellent patellar tracking. At this point, copious irrigation was performed with the pulsatile  lavage, and the bone was prepped for cement. The permanent components  were then cemented into position. After the cement hardened, the knee  was again taken through a range of motion and gave the same excellent  alignment and stability. The permanent polyethylene prosthesis was applied to the tibia. The  pneumatic tourniquet was deflated at this point, and hemostasis was  obtained with Bovie electrocauterization.   The wound was then closed in  a layered fashion. A Hemovac drain had been placed into the lateral  gutter. The patient was stable and taken to the recovery room with a knee  immobilization device in place.         Lafayette Cooks, MD    D: 03/10/2020 16:42:00       T: 03/10/2020 18:06:29     [email or web address]  Job#: [id number]     Doc#: [id number]    CC:

## 2020-03-10 NOTE — CONSULTS
Hospital Medicine  Consult History & Physical        Chief Complaint: Right knee pain    Date of Service: Pt seen/examined in consultation on 3/10/2020    History Of Present Illness:      80 y.o. female who we are asked to see/evaluate by Florentin Mancini MD for medical management   Pt presented today for an elective surgery right total knee replacement by Dr. Melanie Hill. Surgery went as planned without any complications, EBL was 298 cc as reported on surgical procedure note. Pt is currently seen on the floor post op. She seems to be doing fairly well, c/o mild soreness in the right knee region that is well controlled with the pain medications ordered, apart from that she denies any chest pain, dyspnea, abdominal pain, fevers or chills, nausea or vomiting. Hospitalist team was consulted for medical management. Past Medical History:        Diagnosis Date    Anxiety 7/20/2012    Arthritis     Asthma     Carpal tunnel syndrome of left wrist 10/6/2011    Depression 5/2/2012    GERD (gastroesophageal reflux disease) 9/4/2013    Hematuria     chronic from stone    History of nonadherence to medical treatment 10/11/2014    Hypertension     Kidney stone     saw Dr Herman Poag - 6 cm stone    Thyroid disease     goiter    Tight ring on finger     unable to remove - left hand    Wears dentures     full upper    Wears glasses     for reading       Past Surgical History:        Procedure Laterality Date    CARPAL TUNNEL RELEASE Right 09/14/2011    OPEN REDUCTION INTERNAL FIXATION RIGHT WRIST (RUSSO'S FRACTURE)    HYSTERECTOMY         Medications Prior to Admission:    Prior to Admission medications    Medication Sig Start Date End Date Taking?  Authorizing Provider   albuterol sulfate  (90 Base) MCG/ACT inhaler Inhale 2 puffs into the lungs every 6 hours as needed for Wheezing 3/4/20   Diane Harris MD   amLODIPine-benazepril (LOTREL) 5-20 MG per capsule TAKE 1 CAPSULE BY MOUTH EVERY DAY 3/4/20   Marli Myers MD Kimberly   meloxicam (MOBIC) 15 MG tablet Take 1 tablet by mouth daily 12/4/19 3/3/20  Юлия Harris MD   SENNA CO Take 1 tablet by mouth as needed     Historical Provider, MD   Cholecalciferol ([employer] D3) 2000 units CAPS TAKE ONE CAPSULE BY MOUTH DAILY  Patient not taking: Reported on 3/4/2020 1/2/19   Юлия Harris MD   magnesium hydroxide (MILK OF MAGNESIA) 400 MG/5ML suspension Take 30 mLs by mouth daily as needed for Constipation 8/29/18   Юлия Harris MD   Misc. Devices (ROLLER [place]) MISC 1 each by Does not apply route daily 7/19/18   Юлия Harris MD   aspirin 325 MG EC tablet Take 325 mg by mouth every 4 hours as needed     Historical Provider, MD       Allergies:  Hydrochlorothiazide    Social History:      The patient currently lives at home    TOBACCO:   reports that she has never smoked. She has never used smokeless tobacco.  ETOH:   reports no history of alcohol use. Family History:  Positive as follows:        Problem Relation Age of Onset    Other Mother         scleroderma    Heart Disease Father         MI    Heart Attack Son 43    Diabetes Son        REVIEW OF SYSTEMS:   Pertinent positives as noted in the HPI. All other systems reviewed and negative. PHYSICAL EXAM PERFORMED:  /68   Pulse 72   Temp 98.2 °F (36.8 °C) (Oral)   Resp 16   Ht 5' 7\" (1.702 m)   Wt 115 lb (52.2 kg)   SpO2 96%   BMI 18.01 kg/m²   General appearance: No apparent distress, appears stated age and cooperative. HEENT: Normal cephalic, atraumatic without obvious deformity. Pupils equal, round, and reactive to light. Extra ocular muscles intact. Conjunctivae/corneas clear. Neck: Supple, with full range of motion. No jugular venous distention. Trachea midline. Respiratory:  Normal respiratory effort. Clear to auscultation, bilaterally without Rales/Wheezes/Rhonchi. Cardiovascular: Regular rate and rhythm with normal S1/S2 without murmurs, rubs or gallops.   Abdomen: Soft, non-tender, non-distended with normal bowel sounds. Musculoskeletal: No clubbing, cyanosis or edema bilaterally. Right knee dressing clean and intact  Skin: Skin color, texture, turgor normal.  No rashes or lesions. Neurologic:  Neurovascularly intact without any focal sensory/motor deficits. Cranial nerves: II-XII intact, grossly non-focal.  Psychiatric: Alert and oriented, thought content appropriate, normal insight  Capillary Refill: Brisk,< 3 seconds   Peripheral Pulses: +2 palpable, equal bilaterally     Labs:     No results for input(s): WBC, HGB, HCT, PLT in the last 72 hours. No results for input(s): NA, K, CL, CO2, BUN, CREATININE, CALCIUM, PHOS in the last 72 hours. Invalid input(s): MAGNES  No results for input(s): AST, ALT, BILIDIR, BILITOT, ALKPHOS in the last 72 hours. No results for input(s): INR in the last 72 hours. No results for input(s): Hammad Lonnie in the last 72 hours. Urinalysis:    Lab Results   Component Value Date    NITRU Negative 02/25/2020    WBCUA 6-10 02/25/2020    BACTERIA 1+ 02/25/2020    RBCUA >100 02/25/2020    BLOODU LARGE 02/25/2020    SPECGRAV >=1.030 02/25/2020    GLUCOSEU Negative 02/25/2020       Radiology: I have reviewed the radiology reports with the following interpretation:     XR KNEE RIGHT (1-2 VIEWS)   Preliminary Result   No acute complication status post right knee arthroplasty. ASSESSMENT:PLAN:    Right knee osteoarthritis  Status post total right knee replacement  Orthopedics managing perioperative antibiotics, pain management, PT/OT  Incentive spirometry and DVT prophylaxis ordered  Follow-up labs in a.m.    Mild intermittent asthma-controlled, no dyspnea now. Albuterol PRN ordered    Asymptomatic hypotension, relative. SBP in the 80s to 90s but pt with no symptoms.  Monitor bp closely and hold off on home bp meds, continue iv fluids    Hypertension-controlled, held home meds for now      DVT Prophylaxis: On Eliquis  Diet: DIET GENERAL;  Code Status: Full Code    PT/OT Eval Status: Active and ongoing    Dispo -per orthopedics, no barriers to discharge per IM    Thank you for the consultation, will follow up as needed    Luana Kennedy MD

## 2020-03-10 NOTE — H&P
I have reviewed the history and physical and examined the patient and find no relevant changes. I have reviewed with the patient and/or family the risks, benefits, and alternatives to the procedure. Patient being given increased dosage/quantity of opoid pain medication in excess of OSMB guidelines which noted a 30 MED daily of opioids due to the fact that he/she has undergone major orthopaedic surgery as outlined in rule 4731-11-13. Dosages and further duration of the pain medication will be re-evaluated at her post op visit in 2 weeks. Patient was educated on dosing expectations and limits of prescribing as a result of the new [facility] rules enacted August 31, 2017. Please also note that this is not the initial opoid prescription issued to this patient but a continuation of medication utilized during the hospital admission as noted in the medical record. [place]ARRS report has also been utilized to screen for any abuse history or suspicious activity as outlined in [place]. All efforts have been taken to prevent abuse potential and misuse of opioid medications including education, screening, and close clinical follow up. Controlled Substance Monitoring:    Acute and Chronic Pain Monitoring:   RX Monitoring 3/10/2020   Acute Pain Prescriptions -   Periodic Controlled Substance Monitoring No signs of potential drug abuse or diversion identified.

## 2020-03-10 NOTE — ANESTHESIA POSTPROCEDURE EVALUATION
Date/Time                  WBC                      5.1                 02/25/2020 12:04 PM        HGB                      12.5                02/25/2020 12:04 PM        HCT                      37.8                02/25/2020 12:04 PM        PLT                      185                 02/25/2020 12:04 PM   RENAL  Lab Results       Component                Value               Date/Time                  NA                       138                 02/25/2020 12:04 PM        K                        4.6                 02/25/2020 12:04 PM        K                        3.8                 01/20/2018 04:42 AM        CL                       99                  02/25/2020 12:04 PM        CO2                      26                  02/25/2020 12:04 PM        BUN                      26 (H)              02/25/2020 12:04 PM        CREATININE               0.9                 02/25/2020 12:04 PM        GLUCOSE                  83                  02/25/2020 12:04 PM   COAGS  Lab Results       Component                Value               Date/Time                  PROTIME                  11.4                02/25/2020 12:04 PM        INR                      0.98                02/25/2020 12:04 PM        APTT                     32.8                02/25/2020 12:04 PM     Intake & Output: In: 900 (I.V.:900)  Out: -     Nausea & Vomiting:  No    Level of Consciousness:  Awake    Pain Assessment:  Adequate analgesia    Anesthesia Complications:  No apparent anesthetic complications    SUMMARY      Vital signs stable  OK to discharge from Stage I post anesthesia care.   Care transferred from Anesthesiology department on discharge from perioperative area

## 2020-03-10 NOTE — BRIEF OP NOTE
Brief Postoperative Note  ______________________________________________________________    Patient: Rickey Minus  YOB: 1935  MRN: [id number]  Date of Procedure: 3/10/2020    Pre-Op Diagnosis: RIGHT KNEE OSTEOARTHRITIS    Post-Op Diagnosis: Same       Procedure(s):  RIGHT TOTAL KNEE REPLACEMENT       KANG & NEPHNATHALIE    Anesthesia: Regional, Spinal    Surgeon(s):  Alonzo Caballero MD    Assistant: Dorita PAULA    Estimated Blood Loss (mL): 972     Complications: None    Specimens:   ID Type Source Tests Collected by Time Destination   A : RIGHT KNEE BONE Bone Bone SURGICAL PATHOLOGY Alonzo Caballero MD 3/10/2020 1043        Implants:  Implant Name Type Inv.  Item Serial No.  Lot No. LRB No. Used   CEMENT BONE RALLY HV Cement CEMENT BONE RALLY HV  [place] [id number] Right 2   IMPL KNEE LGN FEM COMP PS NP SZ 5 RT Knee IMPL KNEE LGN FEM COMP PS NP SZ 5 RT  [place] [id number] Right 1   IMPL KNEE TIB ARTICULAR SZ 3TO4 9MM Knee IMPL KNEE TIB ARTICULAR SZ 3TO4 9MM  [place] [id number] Right 1   IMPL KNEE TIB BASE NON PORS SZ 4 Knee IMPL KNEE TIB BASE NON PORS SZ 4  [place] [id number] Right 1   IMPL KNEE PATELLA COMP RESURF 32MM Knee IMPL KNEE PATELLA COMP RESURF 32MM  [place] [id number] Right 1         Drains: * No LDAs found *    Findings: OA    NISA Anderson  Date: 3/10/2020  Time: 11:35 AM

## 2020-03-10 NOTE — ANESTHESIA PROCEDURE NOTES
Spinal Block    Patient location during procedure: OR  Start time: 3/10/2020 10:07 AM  End time: 3/10/2020 10:19 AM  Reason for block: primary anesthetic  Staffing  Anesthesiologist: Analisa Tsang MD  Resident/CRNA: RACHEL Degroot - CRNA  Performed: resident/CRNA   Preanesthetic Checklist  Completed: patient identified, site marked, surgical consent, pre-op evaluation, timeout performed, IV checked, risks and benefits discussed, monitors and equipment checked, anesthesia consent given, oxygen available and patient being monitored  Spinal Block  Patient position: sitting  Prep: ChloraPrep  Patient monitoring: continuous pulse ox and frequent blood pressure checks  Approach: midline  Location: L2/L3  Provider prep: mask and sterile gloves  Local infiltration: lidocaine  Agent: bupivacaine  Dose: 1.4  Dose: 1.4  Needle  Needle type: Pencan   Needle gauge: 24 G  Assessment  Sensory level: T6  Swirl obtained: Yes  CSF: clear  Attempts: 2  Hemodynamics: stable

## 2020-03-10 NOTE — PROGRESS NOTES
Pt worked with PT/OT. Pt stated she was feeling dizzy as she started to sit up. bp rechecked 183/155. 5mg labetalol given bp 106 69. Pt states she is feeling much better.

## 2020-03-11 PROBLEM — E44.0 MODERATE MALNUTRITION (HCC): Chronic | Status: ACTIVE | Noted: 2020-03-11

## 2020-03-11 PROBLEM — E44.0 MODERATE MALNUTRITION (HCC): Status: ACTIVE | Noted: 2020-03-11

## 2020-03-11 LAB
ANION GAP SERPL CALCULATED.3IONS-SCNC: 9 MMOL/L (ref 3–16)
BUN BLDV-MCNC: 25 MG/DL (ref 7–20)
CALCIUM SERPL-MCNC: 8.9 MG/DL (ref 8.3–10.6)
CHLORIDE BLD-SCNC: 105 MMOL/L (ref 99–110)
CO2: 25 MMOL/L (ref 21–32)
CREAT SERPL-MCNC: 0.8 MG/DL (ref 0.6–1.2)
GFR AFRICAN AMERICAN: >60
GFR NON-AFRICAN AMERICAN: >60
GLUCOSE BLD-MCNC: 110 MG/DL (ref 70–99)
HCT VFR BLD CALC: 25.7 % (ref 36–48)
HEMOGLOBIN: 8.5 G/DL (ref 12–16)
MCH RBC QN AUTO: 29.8 PG (ref 26–34)
MCHC RBC AUTO-ENTMCNC: 32.9 G/DL (ref 31–36)
MCV RBC AUTO: 90.6 FL (ref 80–100)
PDW BLD-RTO: 13.5 % (ref 12.4–15.4)
PLATELET # BLD: 149 K/UL (ref 135–450)
PMV BLD AUTO: 8.6 FL (ref 5–10.5)
POTASSIUM REFLEX MAGNESIUM: 4.2 MMOL/L (ref 3.5–5.1)
RBC # BLD: 2.84 M/UL (ref 4–5.2)
SODIUM BLD-SCNC: 139 MMOL/L (ref 136–145)
WBC # BLD: 6.8 K/UL (ref 4–11)

## 2020-03-11 PROCEDURE — 80048 BASIC METABOLIC PNL TOTAL CA: CPT

## 2020-03-11 PROCEDURE — 1200000000 HC SEMI PRIVATE

## 2020-03-11 PROCEDURE — 97110 THERAPEUTIC EXERCISES: CPT

## 2020-03-11 PROCEDURE — 2580000003 HC RX 258: Performed by: NURSE PRACTITIONER

## 2020-03-11 PROCEDURE — 97530 THERAPEUTIC ACTIVITIES: CPT

## 2020-03-11 PROCEDURE — 36415 COLL VENOUS BLD VENIPUNCTURE: CPT

## 2020-03-11 PROCEDURE — 2700000000 HC OXYGEN THERAPY PER DAY

## 2020-03-11 PROCEDURE — 6370000000 HC RX 637 (ALT 250 FOR IP): Performed by: PHYSICIAN ASSISTANT

## 2020-03-11 PROCEDURE — 85027 COMPLETE CBC AUTOMATED: CPT

## 2020-03-11 PROCEDURE — APPNB30 APP NON BILLABLE TIME 0-30 MINS: Performed by: PHYSICIAN ASSISTANT

## 2020-03-11 PROCEDURE — 94761 N-INVAS EAR/PLS OXIMETRY MLT: CPT

## 2020-03-11 PROCEDURE — 97535 SELF CARE MNGMENT TRAINING: CPT

## 2020-03-11 PROCEDURE — 6360000002 HC RX W HCPCS: Performed by: PHYSICIAN ASSISTANT

## 2020-03-11 PROCEDURE — 2580000003 HC RX 258: Performed by: PHYSICIAN ASSISTANT

## 2020-03-11 RX ORDER — ACETAMINOPHEN 325 MG/1
650 TABLET ORAL EVERY 6 HOURS PRN
Status: DISCONTINUED | OUTPATIENT
Start: 2020-03-11 | End: 2020-03-14 | Stop reason: HOSPADM

## 2020-03-11 RX ADMIN — SENNOSIDES AND DOCUSATE SODIUM 1 TABLET: 8.6; 5 TABLET ORAL at 21:41

## 2020-03-11 RX ADMIN — APIXABAN 2.5 MG: 2.5 TABLET, FILM COATED ORAL at 21:12

## 2020-03-11 RX ADMIN — Medication 10 ML: at 21:13

## 2020-03-11 RX ADMIN — CEFAZOLIN SODIUM 2 G: 10 INJECTION, POWDER, FOR SOLUTION INTRAVENOUS at 02:42

## 2020-03-11 RX ADMIN — ACETAMINOPHEN AND CODEINE PHOSPHATE 1 TABLET: 300; 30 TABLET ORAL at 08:20

## 2020-03-11 RX ADMIN — APIXABAN 2.5 MG: 2.5 TABLET, FILM COATED ORAL at 08:20

## 2020-03-11 RX ADMIN — ACETAMINOPHEN AND CODEINE PHOSPHATE 1 TABLET: 300; 30 TABLET ORAL at 21:13

## 2020-03-11 RX ADMIN — ACETAMINOPHEN AND CODEINE PHOSPHATE 1 TABLET: 300; 30 TABLET ORAL at 14:28

## 2020-03-11 RX ADMIN — SODIUM CHLORIDE 500 ML: 9 INJECTION, SOLUTION INTRAVENOUS at 00:06

## 2020-03-11 ASSESSMENT — PAIN SCALES - GENERAL
PAINLEVEL_OUTOF10: 7
PAINLEVEL_OUTOF10: 8
PAINLEVEL_OUTOF10: 0
PAINLEVEL_OUTOF10: 8

## 2020-03-11 ASSESSMENT — PAIN DESCRIPTION - PAIN TYPE
TYPE: SURGICAL PAIN

## 2020-03-11 ASSESSMENT — PAIN DESCRIPTION - LOCATION
LOCATION: KNEE

## 2020-03-11 ASSESSMENT — PAIN DESCRIPTION - ORIENTATION
ORIENTATION: RIGHT

## 2020-03-11 ASSESSMENT — PAIN DESCRIPTION - DESCRIPTORS
DESCRIPTORS: SORE
DESCRIPTORS: SORE

## 2020-03-11 ASSESSMENT — PAIN DESCRIPTION - FREQUENCY
FREQUENCY: CONTINUOUS
FREQUENCY: CONTINUOUS

## 2020-03-11 ASSESSMENT — PAIN SCALES - WONG BAKER: WONGBAKER_NUMERICALRESPONSE: 2

## 2020-03-11 NOTE — PROGRESS NOTES
Occupational Therapy  Facility/Department: [facility] - MED SURG/ORTHO  Daily Treatment Note  NAME: Faye   : 1935  MRN: [id number]    Date of Service: 3/11/2020    Discharge Recommendations:  Subacute/Skilled Nursing Facility     Assessment   Performance deficits / Impairments: Decreased functional mobility ; Decreased ADL status; Decreased endurance;Decreased balance;Decreased high-level IADLs;Decreased posture;Decreased strength;Decreased safe awareness  Assessment: Pt lives alone, PLOF independent, following R TKA pt requiring Ax2 for functional transers and standing balance with RW. Pt with significant ADL deficits, due to the above noted functional performance deficits pt would benefit from continued skilled OT in SNF setting at d/c. Prognosis: Good  OT Education: Plan of Care;OT Role;Transfer Training;ADL Adaptive Strategies;Precautions; Equipment  REQUIRES OT FOLLOW UP: Yes  Activity Tolerance  Activity Tolerance: Patient Tolerated treatment well  Safety Devices  Safety Devices in place: Yes  Type of devices: Left in chair;Chair alarm in place;Call light within reach;Nurse notified;Gait belt         Patient Diagnosis(es): The encounter diagnosis was Primary osteoarthritis of right knee. has a past medical history of Anxiety, Arthritis, Asthma, Carpal tunnel syndrome of left wrist, Depression, GERD (gastroesophageal reflux disease), Hematuria, History of nonadherence to medical treatment, Hypertension, Kidney stone, Thyroid disease, Tight ring on finger, Wears dentures, and Wears glasses. has a past surgical history that includes Hysterectomy; Carpal tunnel release (Right, 2011); and Total knee arthroplasty (Right, 3/10/2020).     Restrictions  Restrictions/Precautions  Restrictions/Precautions: Weight Bearing, General Precautions, Surgical Protocols, Fall Risk  Required Braces or Orthoses?: Yes(KI to RLE when OOB until (I) with SLR x 10 reps)  Lower Extremity Weight Bearing of 2)  Transfer Comments: hand over hand for safe hand placement with transfers     Cognition  Overall Cognitive Status: Exceptions  Following Commands: Follows one step commands with repetition; Follows one step commands with increased time  Insights: Decreased awareness of deficits  Initiation: Requires cues for some  Sequencing: Requires cues for some     Plan   Plan  Times per week: 5-7    AM-PAC Score  AM-PAC Inpatient Daily Activity Raw Score: 13 (03/11/20 1113)  AM-PAC Inpatient ADL T-Scale Score : 32.03 (03/11/20 1113)  ADL Inpatient CMS 0-100% Score: 63.03 (03/11/20 1113)  ADL Inpatient CMS G-Code Modifier : CL (03/11/20 1113)    Goals  Short term goals  Time Frame for Short term goals: 1 week 3/17/20  Short term goal 1: BSC t/f with min A x2 (ongoing 3/11/20)  Short term goal 2: Toileting with mod A (ongoing 3/11/20)  Short term goal 3: Bed mobility with mod A (goal partially met- mod A supine to sit 3/11/20)  Short term goal 4: Seated UB ADL with SBA (progressing- min A 3/11/20)  Short term goal 5: Verbalize understanding of safe car t/f technique after education by 3/15/20 (ongoing 3/11/20)  Patient Goals   Patient goals : \"Get better, stay in the hospital for 2 weeks. \"     Therapy Time   Individual Concurrent Group Co-treatment   Time In 1010         Time Out 1048         Minutes [address] [facility], [facility]/

## 2020-03-11 NOTE — PROGRESS NOTES
RESPIRATORY THERAPY ASSESSMENT    Name:  Rossana [facility] Record Number:  [id number]  Age: 80 y.o. Gender: female  : 1935  Today's Date:  3/10/2020  Room:  57/5069-78    Assessment     Is the patient being admitted for a COPD or Asthma exacerbation? No   (If yes the patient will be seen every 4 hours for the first 24 hours and then reassessed)    Patient Admission Diagnosis      Allergies  Allergies   Allergen Reactions    Hydrochlorothiazide      Constipation and severe leg cramps       Minimum Predicted Vital Capacity:     930          Actual Vital Capacity:      CORETTA              Pulmonary History:Asthma  Home Oxygen Therapy:  room air  Home Respiratory Therapy:Albuterol   Current Respiratory Therapy:  MDI Albuterol PRN          Respiratory Severity Index(RSI)   Patients with orders for inhalation medications, oxygen, or any therapeutic treatment modality will be placed on Respiratory Protocol. They will be assessed with the first treatment and at least every 72 hours thereafter. The following severity scale will be used to determine frequency of treatment intervention.     Smoking History: No Smoking History = 0    Social History  Social History     Tobacco Use    Smoking status: Never Smoker    Smokeless tobacco: Never Used   Substance Use Topics    Alcohol use: No    Drug use: No       Recent Surgical History: Surgery of Extremities = 1  Past Surgical History  Past Surgical History:   Procedure Laterality Date    CARPAL TUNNEL RELEASE Right 2011    OPEN REDUCTION INTERNAL FIXATION RIGHT WRIST (RUSSO'S FRACTURE)    HYSTERECTOMY         Level of Consciousness: Alert, Oriented, and Cooperative = 0    Level of Activity: Walking with assistance = 1    Respiratory Pattern: Regular Pattern; RR 8-20 = 0    Breath Sounds: Diminshed bilaterally and/or crackles = 2    Sputum   ,  ,    Cough: Strong, spontaneous, non-productive = 0    Vital Signs   BP 90/60   Pulse 71   Temp 97.8 °F (36.6 °C) (Oral)   Resp 16   Ht 5' 7\" (1.702 m)   Wt 115 lb (52.2 kg)   SpO2 97%   BMI 18.01 kg/m²   SPO2 (COPD values may differ): 90-91% on room air or greater than 92% on FiO2 24- 28% = 1    Peak Flow (asthma only): not applicable = 0    RSI: 0-4 = See once and convert to home regimen or discontinue        Plan       Goals: medication delivery, mobilize retained secretions, volume expansion and improve oxygenation    Patient/caregiver was educated on the proper method of use for Respiratory Care Devices:  Yes      Level of patient/caregiver understanding able to:   ? Verbalize understanding   ? Demonstrate understanding       ? Teach back        ? Needs reinforcement       ? No available caregiver               ? Other:     Response to education:  Good     Is patient being placed on Home Treatment Regimen? Yes     Does the patient have everything they need prior to discharge? NA     Comments: Patient admits for a right total knee replacement. Plan of Care: MDI Albuterol PRN    Electronically signed by Jesu Myers RCP on 3/10/2020 at 9:43 PM    Respiratory Protocol Guidelines     1. Assessment and treatment by Respiratory Therapy will be initiated for medication and therapeutic interventions upon initiation of aerosolized medication. 2. Physician will be contacted for respiratory rate (RR) greater than 35 breaths per minute. Therapy will be held for heart rate (HR) greater than 140 beats per minute, pending direction from physician. 3. Bronchodilators will be administered via Metered Dose Inhaler (MDI) with spacer when the following criteria are met:  a. Alert and cooperative     b. HR < 140 bpm  c. RR < 30 bpm                d. Can demonstrate a 2-3 second inspiratory hold  4. Bronchodilators will be administered via Hand Held Nebulizer [facility]) to patients when ANY of the following criteria are met  a. Incognizant or uncooperative          b. Patients treated with HHN at Home        c.  Unable to demonstrate proper use of MDI with spacer     d. RR > 30 bpm   5. Bronchodilators will be delivered via Metered Dose Inhaler (MDI), HHN, Aerogen to intubated patients on mechanical ventilation. 6. Inhalation medication orders will be delivered and/or substituted as outlined below. Aerosolized Medications Ordering and Administration Guidelines:    1. All Medications will be ordered by a physician, and their frequency and/or modality will be adjusted as defined by the patients Respiratory Severity Index (RSI) score. 2. If the patient does not have documented COPD, consider discontinuing anticholinergics when RSI is less than 9.  3. If the bronchospasm worsens (increased RSI), then the bronchodilator frequency can be increased to a maximum of every 4 hours. If greater than every 4 hours is required, the physician will be contacted. 4. If the bronchospasm improves, the frequency of the bronchodilator can be decreased, based on the patient's RSI, but not less than home treatment regimen frequency. 5. Bronchodilator(s) will be discontinued if patient has a RSI less than 9 and has received no scheduled or as needed treatment for 72  Hrs. Patients Ordered on a Mucolytic Agent:    1. Must always be administered with a bronchodilator. 2. Discontinue if patient experiences worsened bronchospasm, or secretions have lessened to the point that the patient is able to clear them with a cough. Anti-inflammatory and Combination Medications:    1. If the patient lacks prior history of lung disease, is not using inhaled anti-inflammatory medication at home, and lacks wheezing by examination or by history for at least 24 hours, contact physician for possible discontinuation.

## 2020-03-11 NOTE — PLAN OF CARE
Problem: Mobility - Impaired:  Goal: Mobility will improve  Description: Mobility will improve  Outcome: Ongoing     Problem: Infection - Surgical Site:  Goal: Will show no infection signs and symptoms  Description: Will show no infection signs and symptoms  Outcome: Ongoing     Problem: Pain - Acute:  Goal: Pain level will decrease  Description: Pain level will decrease  Outcome: Ongoing     Problem: Falls - Risk of:  Goal: Will remain free from falls  Description: Will remain free from falls  Outcome: Ongoing  Goal: Absence of physical injury  Description: Absence of physical injury  Outcome: Ongoing     Problem: Pain:  Goal: Pain level will decrease  Description: Pain level will decrease  Outcome: Ongoing  Goal: Control of acute pain  Description: Control of acute pain  Outcome: Ongoing  Goal: Patient's pain/discomfort is manageable  Description: Patient's pain/discomfort is manageable  Outcome: Ongoing     Problem: Skin Integrity:  Goal: Will show no infection signs and symptoms  Description: Will show no infection signs and symptoms  Outcome: Ongoing  Goal: Absence of new skin breakdown  Description: Absence of new skin breakdown  Outcome: Ongoing     Problem: Infection:  Goal: Will remain free from infection  Description: Will remain free from infection  Outcome: Ongoing     Problem: Safety:  Goal: Free from accidental physical injury  Description: Free from accidental physical injury  Outcome: Ongoing  Goal: Free from intentional harm  Description: Free from intentional harm  Outcome: Ongoing     Problem: Daily Care:  Goal: Daily care needs are met  Description: Daily care needs are met  Outcome: Ongoing     Problem: Skin Integrity:  Goal: Skin integrity will stabilize  Description: Skin integrity will stabilize  Outcome: Ongoing     Problem: Discharge Planning:  Goal: Patients continuum of care needs are met  Description: Patients continuum of care needs are met  Outcome: Ongoing

## 2020-03-11 NOTE — PROGRESS NOTES
Nutrition Assessment    Type and Reason for Visit: Initial, Positive Nutrition Screen(swallow/chewing difficulty, weight loss, decreased intakes)    Nutrition Recommendations:   1. Continue general diet   2. Encourage PO intakes   3. Monitor nutrition adequacy, pertinent labs, bowel habits, wt changes, and clinical progress    Nutrition Assessment: Moderate malnutrition AEB physical assessment. Pt reports good appetite and PO intakes PTA. At time of visit, pt eating lunch ate grilled cheese and potato soup, working on salad. Pt reports good appetite. Pt c/o difficulty chewing with dentures and reports needing new ones, refused dental soft diet. Pt refused ONS. Pt c/o constipation, pt may benefit from bowel regimen. Noted milk of magnesium ordered PRN. Will continue to monitor. Malnutrition Assessment:  · Malnutrition Status: Meets the criteria for moderate malnutrition  · Context: Chronic illness  · Findings of the 6 clinical characteristics of malnutrition (Minimum of 2 out of 6 clinical characteristics is required to make the diagnosis of moderate or severe Protein Calorie Malnutrition based on AND/ASPEN Guidelines):  1. Energy Intake-Unable to assess,      2. Weight Loss-Unable to assess,    3. Fat Loss-Moderate subcutaneous fat loss, Orbital(may be age related)  4. Muscle Loss-Severe muscle mass loss, Clavicles (pectoralis and deltoids), Temples (temporalis muscle)(may be age related)  11. Fluid Accumulation-No significant fluid accumulation,    6.  Strength-Not measured    Nutrition Risk Level: Moderate    Nutrient Needs:  · Estimated Daily Total Kcal: 2845-9782 kcal  · Estimated Daily Protein (g): 62-74 g  · Estimated Daily Total Fluid (ml/day): 1 ml/kcal    Nutrition Diagnosis:   · Problem:  Moderate malnutrition  · Etiology: related to Increased demand for energy/nutrients, Insufficient energy/nutrient consumption    Signs and symptoms:  as evidenced by Presence of wounds, BMI, Severe muscle loss, Moderate loss of subcutaneous fat    Objective Information:  · Nutrition-Focused Physical Findings: See malnutrition assessment  · Wound Type: Surgical Wound(surgical incision to R knee)  · Current Nutrition Therapies:  · Oral Diet Orders: General   · Oral Diet intake: Unable to assess  · Oral Nutrition Supplement (ONS) Orders: None  · Anthropometric Measures:  · Ht: 5' 7\" (170.2 cm)   · Current Body Wt: 115 lb (52.2 kg)(stated weight)  · Usual Body Wt: 120 lb (54.4 kg)(per pt)  · % Weight Change:  ,  CORETTA d/t weight hx unknown weight methods  · Ideal Body Wt: 135 lb (61.2 kg),   · BMI Classification: BMI <18.5 Underweight    Nutrition Interventions:   Continue current diet  Continued Inpatient Monitoring    Nutrition Evaluation:   · Evaluation: Goals set   · Goals: Pt will consume greater than 50% of meals and ONS this admission    · Monitoring: Meal Intake, Weight, Pertinent Labs, Chewing/Swallowing      Electronically signed by Gisela Fields, MS, RD, LD on 3/11/20 at 2:23 PM EDT    Contact Number: Office: [phone]; [address]: [phone]

## 2020-03-11 NOTE — CARE COORDINATION
CASE MANAGEMENT INITIAL ASSESSMENT      Reviewed chart and met with patient today, re: potential discharge planning needs   Explained Case Management role/services. Family present: N/A  Primary contact information: grandson [phone]    Admit date/status: inpt 3/10/2020  Diagnosis: Total Knee  Is this a Readmission?: N    Insurance: Medicare/ [employer]  Precert required for SNF - N        3 night stay required - N Bundle pt     Living arrangements, Adls, care needs, prior to admission: Pt notes that she was living at home prior to admission. Transportation: [facility]    [facility] [place] at home: Walker_x_Cane__RTS_x_ BSC__Shower Chair__  02__ HHN__ CPAP__  BiPap__  Hospital Bed__ W/C___ Other__________    Services in the home and/or outpatient, prior to admission: Pt is active with [facility]     PT/OT recs: SNF    Hospital Exemption Notification (HEN): Not started    Barriers to discharge: N/A    Plan/comments: Nikolay met with pt on this day to discuss potential discharge planning needs. NIKOLAY has confirmed that pt is active with [facility]. Pt is open to going to SNF and has a SNF list that she has been reviewing with her grandson. NIKOLAY attempted to call pt's grandson and no one answers the phone. NIKOLAY will follow.      ECOC on chart for MD signature

## 2020-03-11 NOTE — PROGRESS NOTES
Review  CBC:   Lab Results   Component Value Date    WBC 6.8 03/11/2020    RBC 2.84 03/11/2020    HGB 8.5 03/11/2020    HCT 25.7 03/11/2020     03/11/2020       Assessment:     Status Post right Total Knee Arthroplasty. Doing well postoperatively. Plan:      1: Continues current post-op course, IM following. Labs reviewed and stable post op. Hypotension noted, IVF running. Encourage PO intake. 2:  Continue Deep venous thrombosis prophylaxis- Eliquis  3:  Continue physical therapy and OT, WBAT  4:  Continue Pain Control PRN, monitor with BP  5:  D/c planning for SNF placement, pt does not have assistance at home. Family at bedside today confirms pt does not have consistent care at home. SNF list given to family.   DCP updated    Jeison Locke PA-C

## 2020-03-11 NOTE — PROGRESS NOTES
upon discharge. ABLA  - as expected with surgery. No signs of ongoing blood loss. Monitor. Mild-intermittent asthma  - no wheezing. Albuterol PRN. DVT Prophylaxis: apixaban  Diet: DIET GENERAL;  Code Status: Full Code    PT/OT Eval Status: ongoing    Dispo - defer to primary team.  I see no medical barriers, continue same meds upon discharge. I will plan on signing off this evening but please page me if there are issues. Thank you for the consult.        Bill Dhillon MD

## 2020-03-11 NOTE — PLAN OF CARE
Problem: Nutrition  Goal: Optimal nutrition therapy  Outcome: Ongoing  Note: Nutrition Problem:  Moderate malnutrition  Intervention: Food and/or Nutrient Delivery: Continue current diet  Nutritional Goals: Pt will consume greater than 50% of meals and ONS this admission

## 2020-03-11 NOTE — PROGRESS NOTES
(gastroesophageal reflux disease), Hematuria, History of nonadherence to medical treatment, Hypertension, Kidney stone, Thyroid disease, Tight ring on finger, Wears dentures, and Wears glasses. has a past surgical history that includes Hysterectomy; Carpal tunnel release (Right, 09/14/2011); and Total knee arthroplasty (Right, 3/10/2020). Restrictions  Restrictions/Precautions  Restrictions/Precautions: Weight Bearing, General Precautions, Surgical Protocols, Fall Risk  Required Braces or Orthoses?: No(KI D/C'd by PT on 3/11/20 in afternoon due to adequate quad control)  Lower Extremity Weight Bearing Restrictions  Right Lower Extremity Weight Bearing: Weight Bearing As Tolerated  Position Activity Restriction  Other position/activity restrictions: 1)  Dangle at edge of bed, progress to stand, and take a few steps with assistive device. 2)  Encourage ankle pumps and quad sets. 3)  Up in bedside chair as tolerated. 4)  Commode privileges with assistance. Subjective   General  Chart Reviewed: Yes  Response To Previous Treatment: Patient with no complaints from previous session. Family / Caregiver Present: No  Referring Practitioner: Dr. Milagros Cruz and Nora Leyva PA-C  Subjective  Subjective: Pt agreeable to work with PT this afternoon, pleasant and cooperative. States she's feeling a little better this afternoon and having less pain. General Comment  Comments: Pt resting in bed upon entry of therapy staff  Pain Screening  Patient Currently in Pain: Yes(\"just a little sore\" - pt did not rate on 0-10 scale)  Pain Assessment  Pain Type: Surgical pain  Pain Location: Knee  Pain Orientation: Right  Pain Descriptors: Sore  Pain Frequency: Continuous  Non-Pharmaceutical Pain Intervention(s): Ambulation/Increased Activity;Repositioned; Therapeutic presence    Orientation  Orientation  Overall Orientation Status: Within Functional Limits    Objective   Bed mobility  Supine to Sit: Moderate

## 2020-03-12 LAB
ANION GAP SERPL CALCULATED.3IONS-SCNC: 9 MMOL/L (ref 3–16)
BUN BLDV-MCNC: 22 MG/DL (ref 7–20)
CALCIUM SERPL-MCNC: 8.5 MG/DL (ref 8.3–10.6)
CHLORIDE BLD-SCNC: 103 MMOL/L (ref 99–110)
CO2: 26 MMOL/L (ref 21–32)
CREAT SERPL-MCNC: 0.7 MG/DL (ref 0.6–1.2)
GFR AFRICAN AMERICAN: >60
GFR NON-AFRICAN AMERICAN: >60
GLUCOSE BLD-MCNC: 113 MG/DL (ref 70–99)
HCT VFR BLD CALC: 22.2 % (ref 36–48)
HEMOGLOBIN: 7.3 G/DL (ref 12–16)
MCH RBC QN AUTO: 29.8 PG (ref 26–34)
MCHC RBC AUTO-ENTMCNC: 32.9 G/DL (ref 31–36)
MCV RBC AUTO: 90.7 FL (ref 80–100)
PDW BLD-RTO: 13.6 % (ref 12.4–15.4)
PLATELET # BLD: 113 K/UL (ref 135–450)
PMV BLD AUTO: 8.5 FL (ref 5–10.5)
POTASSIUM REFLEX MAGNESIUM: 4.6 MMOL/L (ref 3.5–5.1)
RBC # BLD: 2.45 M/UL (ref 4–5.2)
SODIUM BLD-SCNC: 138 MMOL/L (ref 136–145)
WBC # BLD: 6.6 K/UL (ref 4–11)

## 2020-03-12 PROCEDURE — 6370000000 HC RX 637 (ALT 250 FOR IP): Performed by: PHYSICIAN ASSISTANT

## 2020-03-12 PROCEDURE — 2580000003 HC RX 258: Performed by: PHYSICIAN ASSISTANT

## 2020-03-12 PROCEDURE — 1200000000 HC SEMI PRIVATE

## 2020-03-12 PROCEDURE — 85027 COMPLETE CBC AUTOMATED: CPT

## 2020-03-12 PROCEDURE — 97530 THERAPEUTIC ACTIVITIES: CPT

## 2020-03-12 PROCEDURE — 97110 THERAPEUTIC EXERCISES: CPT

## 2020-03-12 PROCEDURE — 97116 GAIT TRAINING THERAPY: CPT

## 2020-03-12 PROCEDURE — 36415 COLL VENOUS BLD VENIPUNCTURE: CPT

## 2020-03-12 PROCEDURE — 94761 N-INVAS EAR/PLS OXIMETRY MLT: CPT

## 2020-03-12 PROCEDURE — APPNB30 APP NON BILLABLE TIME 0-30 MINS: Performed by: PHYSICIAN ASSISTANT

## 2020-03-12 PROCEDURE — 80048 BASIC METABOLIC PNL TOTAL CA: CPT

## 2020-03-12 PROCEDURE — 2700000000 HC OXYGEN THERAPY PER DAY

## 2020-03-12 PROCEDURE — 97535 SELF CARE MNGMENT TRAINING: CPT

## 2020-03-12 RX ADMIN — APIXABAN 2.5 MG: 2.5 TABLET, FILM COATED ORAL at 08:24

## 2020-03-12 RX ADMIN — Medication 10 ML: at 08:25

## 2020-03-12 RX ADMIN — Medication 10 ML: at 20:27

## 2020-03-12 RX ADMIN — APIXABAN 2.5 MG: 2.5 TABLET, FILM COATED ORAL at 20:26

## 2020-03-12 RX ADMIN — SENNOSIDES AND DOCUSATE SODIUM 1 TABLET: 8.6; 5 TABLET ORAL at 20:27

## 2020-03-12 RX ADMIN — SENNOSIDES AND DOCUSATE SODIUM 1 TABLET: 8.6; 5 TABLET ORAL at 08:26

## 2020-03-12 ASSESSMENT — PAIN SCALES - GENERAL: PAINLEVEL_OUTOF10: 0

## 2020-03-12 NOTE — PROGRESS NOTES
transfer with mod verbal/visual/tactile cues needed for proper sequencing with walker and to encourage increased step length/foot clearance. Ambulation  WB Status: WBAT RLE  Surface: level tile  Device: Rolling Walker  Assistance: Minimal assistance; Moderate assistance  Quality of Gait: Pt able to take very small steps with poor foot clearance B from bed>chair. Mod cues needed for proper sequencing with walker and to attempt to elicit increased step length. Pt advances RLE well during amb but struggles with LLE advancement (PT assisted with scooting L foot forward/backward along floor during transfer). Gait Deviations: Slow Vannessa; Shuffles;Decreased step length;Decreased step height  Distance: x 4 feet from bed>chair     Balance  Posture: Fair  Sitting - Static: Good;-  Sitting - Dynamic: Fair  Standing - Static: Fair;-  Standing - Dynamic: Poor;+  Comments: Pt required modA x 1 initially when standing at RW to counteract posterior lean, decreasing to Belkis x 1 as standing time progressed. Exercises  Heelslides: x 15 RLE (seated towel slides)  Knee Long Arc Quad: x 15 BLE (I)  Knee Active Range of Motion: R knee AAROM = 5-93 degrees  Ankle Pumps: x 15 BLE  Comments: Pt agreeable to sit up in chair post-PT to perform seated towel slides for knee flexion ROM.      AM-PAC Score  AM-PAC Inpatient Mobility Raw Score : 13 (03/12/20 1359)  AM-PAC Inpatient T-Scale Score : 36.74 (03/12/20 1359)  Mobility Inpatient CMS 0-100% Score: 64.91 (03/12/20 1359)  Mobility Inpatient CMS G-Code Modifier : CL (03/12/20 [facility]9)    Goals  Short term goals  Time Frame for Short term goals: 1 week, 3/17/20   Short term goal 1: Pt will transfer supine <-> sit with CGA x 1 for RLE - MET 3/12/20  Short term goal 2: Pt will transfer sit <-> stand and bed>chair using RW with CGA x 1  Short term goal 3: Pt will ambulate x 50 feet using RW with CGA x 1  Short term goal 4: By 3/12/20: Pt will tolerate 12-15 reps RLE ther ex for ROM/strengthening - MET 3/11/20  Patient Goals   Patient goals : \"To be able to walk around my apartment (distances of approximately 25-30 feet) and to/from my kitchen by myself. \"    Plan    Times per week: BID 7x/week  Times per day: Twice a day  Specific instructions for Next Treatment: Progress ther ex and mobility as tolerated  Current Treatment Recommendations: Strengthening, ROM, Functional Mobility Training, Transfer Training, Safety Education & Training, Patient/Caregiver Education & Training, Equipment Evaluation, Education, & procurement, Home Exercise Program, Gait Training, Endurance Training, Balance Training  Safety Devices:  All fall risk precautions in place, Patient at risk for falls, Nurse notified, Call light within reach, Left in chair, Chair alarm in place, Gait belt     Therapy Time   Individual Concurrent Group Co-treatment   Time In 1151         Time Out 1219         Minutes 28         Timed Code Treatment Minutes: [place], [place], [address]

## 2020-03-12 NOTE — PROGRESS NOTES
Reinforced with pt to turn with assist. Heels floated off of pillows swallowing and making noises with meds, pt confused and trying to get out of bed this am reoriented  After assist up with stedy x2

## 2020-03-12 NOTE — PROGRESS NOTES
safety;Decreased awareness of need for assistance  Insights: Decreased awareness of deficits  Initiation: Requires cues for all  Sequencing: Requires cues for some    Plan   Plan  Times per week: 5-7    AM-PAC Score  AM-PAC Inpatient Daily Activity Raw Score: 13 (03/12/20 1440)  AM-PAC Inpatient ADL T-Scale Score : 32.03 (03/12/20 1440)  ADL Inpatient CMS 0-100% Score: 63.03 (03/12/20 1440)  ADL Inpatient CMS G-Code Modifier : CL (03/12/20 1440)    Goals  Short term goals  Time Frame for Short term goals: 1 week 3/17/20  Short term goal 1: BSC t/f with min A x2 (ongoing 3/12/20)  Short term goal 2: Toileting with mod A (ongoing 3/12/20)  Short term goal 3: Bed mobility with mod A (GOAL MET 3/12/20)  Short term goal 4: Seated UB ADL with SBA (progressing- min A 3/11/20)  Short term goal 5: Verbalize understanding of safe car t/f technique after education by 3/15/20 (discontinue as pt will receive medical transport to SNF 3/12/20)  Patient Goals   Patient goals : \"Get better, stay in the hospital for 2 weeks. \"     Therapy Time   Individual Concurrent Group Co-treatment   Time In 1340         Time Out 1420         Minutes [address], FRANK/L

## 2020-03-12 NOTE — PROGRESS NOTES
Physical Therapy  Facility/Department: [facility] C5 - MED SURG/ORTHO  Daily Treatment Note  NAME: Jessica Espinoza  : 1935  MRN: [id number]    Date of Service: 3/12/2020    Discharge Recommendations:  Subacute/Skilled Nursing Facility   PT Equipment Recommendations  Equipment Needed: No    Assessment   Body structures, Functions, Activity limitations: Decreased functional mobility ; Decreased ROM; Decreased strength;Decreased balance;Decreased posture;Decreased endurance  Assessment: Pt with good progress noted during this afternoon's PT session. While pt still transfers very slowly and with difficulty, she was able to stand with slightly greater ease this afternoon and ambulated ~10 feet with support of RW and min-modA x 1. Pt performing standing activities with good R knee stability without use of KI. Continue to recommend SNF at D/C given current deficits and lack of significant family support at home (pt lives alone). Treatment Diagnosis: Decreased ROM/strength RLE and (I) with mobility s/p R TKR  Specific instructions for Next Treatment: Progress ther ex and mobility as tolerated  Prognosis: Good  Decision Making: Medium Complexity  PT Education: Goals;PT Role;Plan of Care;Transfer Training;Functional Mobility Training;Equipment;Weight-bearing Education;General Safety; Disease Specific Education;Home Exercise Program;Precautions;Orientation  Patient Education: Pt verbalizes understanding but requires some reinforcement. REQUIRES PT FOLLOW UP: Yes  Activity Tolerance: Patient Tolerated treatment well;Patient limited by fatigue  Activity Tolerance: Unable to obtain accurate O2 sat readings during session despite multiple attempts due to pt's hands being cold. Pt with 2L supplemental O2 on after session. Patient Diagnosis(es): The encounter diagnosis was Primary osteoarthritis of right knee.      has a past medical history of Anxiety, Arthritis, Asthma, Carpal tunnel syndrome of left wrist, Depression, GERD (gastroesophageal reflux disease), Hematuria, History of nonadherence to medical treatment, Hypertension, Kidney stone, Thyroid disease, Tight ring on finger, Wears dentures, and Wears glasses. has a past surgical history that includes Hysterectomy; Carpal tunnel release (Right, 09/14/2011); and Total knee arthroplasty (Right, 3/10/2020). Restrictions  Restrictions/Precautions  Restrictions/Precautions: Weight Bearing, General Precautions, Surgical Protocols, Fall Risk  Required Braces or Orthoses?: No(KI D/C'd by PT on 3/11/20 in afternoon due to adequate quad control)  Lower Extremity Weight Bearing Restrictions  Right Lower Extremity Weight Bearing: Weight Bearing As Tolerated  Position Activity Restriction  Other position/activity restrictions: 1)  Dangle at edge of bed, progress to stand, and take a few steps with assistive device. 2)  Encourage ankle pumps and quad sets. 3)  Up in bedside chair as tolerated. 4)  Commode privileges with assistance. Subjective   General  Chart Reviewed: Yes  Response To Previous Treatment: Patient with no complaints from previous session. Family / Caregiver Present: No  Referring Practitioner: Dr. Pedro Cardenas and Rod Santana PA-C  Subjective  Subjective: Pt agreeable to work with PT this afternoon, very pleasant and cooperative. States she'd like to get up to the chair. \"You're not giving up on me yet, are you? \"  Very thankful for therapist coming to work with her throughout session. General Comment  Comments: Pt resting in bed upon entry of therapy staff  Pain Screening  Patient Currently in Pain: Denies       Orientation  Orientation  Overall Orientation Status: Within Functional Limits    Objective   Bed mobility  Supine to Sit: Supervision(moving toward R side, HOB elevated ~30 degrees)  Scooting: Supervision(to scoot forward to EOB)  Comment: Pt performs bed mobility tasks very slowly and with min-mod difficulty.      Transfers  Sit to Stand: 3/17/20   Short term goal 1: Pt will transfer supine <-> sit with CGA x 1 for RLE - MET 3/12/20  Short term goal 2: Pt will transfer sit <-> stand and bed>chair using RW with CGA x 1  Short term goal 3: Pt will ambulate x 50 feet using RW with CGA x 1  Short term goal 4: By 3/12/20: Pt will tolerate 12-15 reps RLE ther ex for ROM/strengthening - MET 3/11/20  Patient Goals   Patient goals : \"To be able to walk around my apartment (distances of approximately 25-30 feet) and to/from my kitchen by myself. \"    Plan    Times per week: BID 7x/week  Times per day: Twice a day  Specific instructions for Next Treatment: Progress ther ex and mobility as tolerated  Current Treatment Recommendations: Strengthening, ROM, Functional Mobility Training, Transfer Training, Safety Education & Training, Patient/Caregiver Education & Training, Equipment Evaluation, Education, & procurement, Home Exercise Program, Gait Training, Endurance Training, Balance Training  Safety Devices:  All fall risk precautions in place, Patient at risk for falls, Nurse notified, Call light within reach, Left in chair, Chair alarm in place, Gait belt     Therapy Time   Individual Concurrent Group Co-treatment   Time In 1637         Time Out 1715         Minutes 38         Timed Code Treatment Minutes: [address] [place], [place] #[id number]

## 2020-03-13 LAB
BLOOD BANK DISPENSE STATUS: NORMAL
BLOOD BANK PRODUCT CODE: NORMAL
BPU ID: NORMAL
DESCRIPTION BLOOD BANK: NORMAL
HCT VFR BLD CALC: 20.5 % (ref 36–48)
HEMOGLOBIN: 6.8 G/DL (ref 12–16)
MCH RBC QN AUTO: 30.3 PG (ref 26–34)
MCHC RBC AUTO-ENTMCNC: 33.1 G/DL (ref 31–36)
MCV RBC AUTO: 91.4 FL (ref 80–100)
PDW BLD-RTO: 13.7 % (ref 12.4–15.4)
PLATELET # BLD: 110 K/UL (ref 135–450)
PMV BLD AUTO: 9.1 FL (ref 5–10.5)
RBC # BLD: 2.24 M/UL (ref 4–5.2)
WBC # BLD: 6.3 K/UL (ref 4–11)

## 2020-03-13 PROCEDURE — 36415 COLL VENOUS BLD VENIPUNCTURE: CPT

## 2020-03-13 PROCEDURE — 6370000000 HC RX 637 (ALT 250 FOR IP): Performed by: PHYSICIAN ASSISTANT

## 2020-03-13 PROCEDURE — 97530 THERAPEUTIC ACTIVITIES: CPT

## 2020-03-13 PROCEDURE — 97110 THERAPEUTIC EXERCISES: CPT

## 2020-03-13 PROCEDURE — 36430 TRANSFUSION BLD/BLD COMPNT: CPT

## 2020-03-13 PROCEDURE — 2580000003 HC RX 258: Performed by: PHYSICIAN ASSISTANT

## 2020-03-13 PROCEDURE — 1200000000 HC SEMI PRIVATE

## 2020-03-13 PROCEDURE — 2700000000 HC OXYGEN THERAPY PER DAY

## 2020-03-13 PROCEDURE — 97535 SELF CARE MNGMENT TRAINING: CPT

## 2020-03-13 PROCEDURE — 94761 N-INVAS EAR/PLS OXIMETRY MLT: CPT

## 2020-03-13 PROCEDURE — 85027 COMPLETE CBC AUTOMATED: CPT

## 2020-03-13 PROCEDURE — APPNB30 APP NON BILLABLE TIME 0-30 MINS: Performed by: PHYSICIAN ASSISTANT

## 2020-03-13 RX ORDER — 0.9 % SODIUM CHLORIDE 0.9 %
20 INTRAVENOUS SOLUTION INTRAVENOUS ONCE
Status: COMPLETED | OUTPATIENT
Start: 2020-03-13 | End: 2020-03-13

## 2020-03-13 RX ORDER — ACETAMINOPHEN AND CODEINE PHOSPHATE 300; 30 MG/1; MG/1
1 TABLET ORAL EVERY 4 HOURS PRN
Qty: 10 TABLET | Refills: 0 | Status: SHIPPED | OUTPATIENT
Start: 2020-03-13 | End: 2020-03-16

## 2020-03-13 RX ADMIN — SENNOSIDES AND DOCUSATE SODIUM 1 TABLET: 8.6; 5 TABLET ORAL at 19:57

## 2020-03-13 RX ADMIN — MAGNESIUM HYDROXIDE 30 ML: 2400 SUSPENSION ORAL at 18:33

## 2020-03-13 RX ADMIN — Medication 10 ML: at 08:32

## 2020-03-13 RX ADMIN — Medication 10 ML: at 19:57

## 2020-03-13 RX ADMIN — SODIUM CHLORIDE 20 ML: 9 INJECTION, SOLUTION INTRAVENOUS at 08:30

## 2020-03-13 RX ADMIN — SENNOSIDES AND DOCUSATE SODIUM 1 TABLET: 8.6; 5 TABLET ORAL at 08:32

## 2020-03-13 RX ADMIN — APIXABAN 2.5 MG: 2.5 TABLET, FILM COATED ORAL at 08:32

## 2020-03-13 RX ADMIN — APIXABAN 2.5 MG: 2.5 TABLET, FILM COATED ORAL at 19:57

## 2020-03-13 ASSESSMENT — PAIN SCALES - GENERAL
PAINLEVEL_OUTOF10: 0
PAINLEVEL_OUTOF10: 4

## 2020-03-13 ASSESSMENT — PAIN DESCRIPTION - PAIN TYPE: TYPE: SURGICAL PAIN

## 2020-03-13 ASSESSMENT — PAIN DESCRIPTION - ORIENTATION: ORIENTATION: RIGHT

## 2020-03-13 ASSESSMENT — PAIN DESCRIPTION - LOCATION
LOCATION: KNEE
LOCATION: KNEE

## 2020-03-13 ASSESSMENT — PAIN SCALES - WONG BAKER: WONGBAKER_NUMERICALRESPONSE: 4

## 2020-03-13 NOTE — PROGRESS NOTES
Occupational Therapy  Facility/Department: [facility] C5 - MED SURG/ORTHO  Daily Treatment Note  NAME: Jessica Espinoza  : 1935  MRN: [id number]    Date of Service: 3/13/2020    Discharge Recommendations:  Subacute/Skilled Nursing Facility     Assessment   Performance deficits / Impairments: Decreased functional mobility ; Decreased ADL status; Decreased endurance;Decreased balance;Decreased high-level IADLs;Decreased strength;Decreased safe awareness;Decreased cognition  Assessment: Pt demos progress this date by tolerating mobility in and out of bathroom with RW, requiring mod-max A for functional/ADL transfers. Pt requires verbal cueing throughout for safety, sequencing, and RW management. Continue OT per POC. Prognosis: Good  OT Education: Plan of Care;OT Role;Transfer Training;ADL Adaptive Strategies;Precautions; Equipment  REQUIRES OT FOLLOW UP: Yes  Activity Tolerance  Activity Tolerance: Patient Tolerated treatment well  Safety Devices  Safety Devices in place: Yes  Type of devices: Call light within reach;Nurse notified;Gait belt;Left in bed;Bed alarm in place         Patient Diagnosis(es): The primary encounter diagnosis was S/P total knee arthroplasty, right. A diagnosis of Primary osteoarthritis of right knee was also pertinent to this visit. has a past medical history of Anxiety, Arthritis, Asthma, Carpal tunnel syndrome of left wrist, Depression, GERD (gastroesophageal reflux disease), Hematuria, History of nonadherence to medical treatment, Hypertension, Kidney stone, Thyroid disease, Tight ring on finger, Wears dentures, and Wears glasses. has a past surgical history that includes Hysterectomy; Carpal tunnel release (Right, 2011); and Total knee arthroplasty (Right, 3/10/2020).     Restrictions  Restrictions/Precautions  Restrictions/Precautions: Weight Bearing, General Precautions, Surgical Protocols, Fall Risk  Required Braces or Orthoses?: No(NUNU D/C'd by PT on 3/11/20 in afternoon due

## 2020-03-13 NOTE — PROGRESS NOTES
(Right, 09/14/2011); and Total knee arthroplasty (Right, 3/10/2020). Restrictions  Restrictions/Precautions  Restrictions/Precautions: Weight Bearing, General Precautions, Surgical Protocols, Fall Risk  Required Braces or Orthoses?: No(KI D/C'd by PT on 3/11/20 in afternoon due to adequate quad control)  Lower Extremity Weight Bearing Restrictions  Right Lower Extremity Weight Bearing: Weight Bearing As Tolerated  Position Activity Restriction  Other position/activity restrictions: 1)  Dangle at edge of bed, progress to stand, and take a few steps with assistive device. 2)  Encourage ankle pumps and quad sets. 3)  Up in bedside chair as tolerated. 4)  Commode privileges with assistance. Subjective   General  Chart Reviewed: Yes  Response To Previous Treatment: Patient with no complaints from previous session. Family / Caregiver Present: No  Referring Practitioner: Dr. Edgardo Jaffe and Channing Mandujano PA-C  Subjective  Subjective: Pt with low HgB and PRBC transfusion earlier, cleared with RN prior to PM treatment. Vitals monitored by PCA just prior to session: VSS (see chart). General Comment  Comments: Pt resting in bed upon entry of PT  Pain Screening  Patient Currently in Pain: Denies  Vital Signs  Patient Currently in Pain: Denies       Orientation  Orientation  Overall Orientation Status: Within Functional Limits  Cognition   Cognition  Overall Cognitive Status: Exceptions  Arousal/Alertness: Delayed responses to stimuli  Following Commands: Follows one step commands with repetition; Follows one step commands with increased time  Attention Span: Appears intact  Memory: Appears intact  Initiation: Requires cues for some(w/ supine exercises)  Sequencing: Does not require cues(w/ supine exercises)  Objective   Bed mobility  Comment: Pt remained in bed throughout session, declining OOB activities 2/2 fatigue.   Transfers  Comment: Pt remained in bed throughout session in supine, declining OOB activities or bed mobility training 2/2 fatigue. Ambulation  Ambulation?: No(pt declined OOB activities 2/2 fatigue (low H/H with transfusion earlier))        Exercises  Straight Leg Raise: in supine: 2 x 10 LLE AROM, 1x 10 RLE (AROM, no assist required) but then fatigued and required AAROM for second set: 1x10 RLE  Quad Sets: in supine: 2 x 10 BLE  Gluteal Sets: in supine: 2 x 10 BLE  Hip Abduction: in supine: 1x10 LLE AROM then 1x 10 LLE AAROM, 2 x 10 RLE AAROM  Knee Passive Range of Motion: 10-85(supine)  Ankle Pumps: in supine: x20 BLE                          AM-PAC Score  AM-PAC Inpatient Mobility Raw Score : 10 (03/13/20 1531)  AM-PAC Inpatient T-Scale Score : 32.29 (03/13/20 1531)  Mobility Inpatient CMS 0-100% Score: 76.75 (03/13/20 1531)  Mobility Inpatient CMS G-Code Modifier : CL (03/13/20 1531)          Goals  Short term goals  Time Frame for Short term goals: 1 week, 3/17/20   Short term goal 1: Pt will transfer supine <-> sit with CGA x 1 for RLE - MET 3/12/20  Short term goal 2: Pt will transfer sit <-> stand and bed>chair using RW with CGA x 1 - 3/13/20: NOT MET, CONTINUE GOAL (currently requiring max A)  Short term goal 3: Pt will ambulate x 50 feet using RW with CGA x 1 - 3/13/20: NOT MET, CONTINUE GOAL (pt unable to perform OOB activities this date 2/2 fatigue, low H/H)  Short term goal 4: By 3/12/20: Pt will tolerate 12-15 reps RLE ther ex for ROM/strengthening - MET 3/11/20  Patient Goals   Patient goals : \"To be able to walk around my apartment (distances of approximately 25-30 feet) and to/from my kitchen by myself. \"    Plan    Plan  Times per week: BID 7x/week  Times per day: Twice a day  Specific instructions for Next Treatment: Progress ther ex and mobility as tolerated  Current Treatment Recommendations: Strengthening, ROM, Functional Mobility Training, Transfer Training, Safety Education & Training, Patient/Caregiver Education & Training, Equipment Evaluation, Education, & procurement, Home

## 2020-03-13 NOTE — PROGRESS NOTES
Inge has been requested.   DCP updated  6:  Hopeful plan for d/c tomorrow pending repeat labs    Claudia Hawkins PA-C

## 2020-03-13 NOTE — DISCHARGE INSTR - COC
Continuity of Care Form    Patient Name: Denise Mckeon   :  1935  MRN:  [id number]    Admit date:  3/10/2020  Discharge date:  3.14.20      Code Status Order: Full Code   Advance Directives:   [facility] Documentation     Date/Time Healthcare Directive Type of Healthcare Directive Copy in [facility] [address] Agent's Name Healthcare Agent's Phone Number    03/10/20 1819  No, patient does not have an advance directive for healthcare treatment -- -- -- -- --    20 1558  No, patient does not have an advance directive for healthcare treatment -- -- -- -- --          Admitting Physician:  Joe Mullen MD  PCP: Andrzej Martins MD    Discharging Nurse: [facility] Unit/Room#: 1604/2807-42  Discharging Unit Phone Number: [phone]    Emergency Contact:   Extended Emergency Contact Information  Primary Emergency Contact: [address] Phone: [phone]  Relation: Grandchild    Past Surgical History:  Past Surgical History:   Procedure Laterality Date    CARPAL TUNNEL RELEASE Right 2011    OPEN REDUCTION INTERNAL FIXATION RIGHT WRIST (RUSSO'S FRACTURE)    HYSTERECTOMY      TOTAL KNEE ARTHROPLASTY Right 3/10/2020    RIGHT TOTAL KNEE REPLACEMENT       [facility] [employer] performed by Joe Mullen MD at [facility] 1       Immunization History:   Immunization History   Administered Date(s) Administered    Influenza, Triv, inactivated, subunit, adjuvanted, IM (Fluad 65 yrs and older) 2019    PPD Test 2008    Pneumococcal Conjugate 13-valent (Dianah Elliott) 10/18/2017    Pneumococcal Polysaccharide ([id number]) 1996, 2008    Td, unspecified formulation 2008       Active Problems:  Patient Active Problem List   Diagnosis Code    Carpal tunnel syndrome of left wrist G56.02    Mild intermittent asthma J45.20    Essential hypertension I10    Goiter E04.9    Vitamin D deficiency E55.9  Staghorn calculus N20.0    Biceps rupture, proximal S46.119A    Incomplete tear of right rotator cuff M75.111    History of nonadherence to medical treatment Z91.19    B12 deficiency E53.8    Primary osteoarthritis of both knees M17.0    Irritable bowel syndrome with constipation K58.1    Environmental and seasonal allergies J30.89    Weakness generalized R53.1    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    S/P total knee arthroplasty, right Z96.651    Moderate malnutrition (HCC) E44.0       Isolation/Infection:   Isolation          No Isolation        Patient Infection Status     None to display          Nurse Assessment:  Last Vital Signs: BP 93/61   Pulse 66   Temp 98.2 °F (36.8 °C) (Axillary)   Resp 14   Ht 5' 7\" (1.702 m)   Wt 115 lb (52.2 kg)   SpO2 94%   BMI 18.01 kg/m²     Last documented pain score (0-10 scale): Pain Level: 4  Last Weight:   Wt Readings from Last 1 Encounters:   03/10/20 115 lb (52.2 kg)     Mental Status:  oriented and forgetful    IV Access:  - None    Nursing Mobility/ADLs:  Walking   Assisted  Transfer  Assisted  Bathing  Assisted  Dressing  Assisted  Toileting  Assisted  Feeding  Assisted  Med Admin  Assisted  Med Delivery   whole    Wound Care Documentation and Therapy:        Elimination:  Continence:   · Bowel: Yes  · Bladder: Yes  Urinary Catheter: None   Colostomy/Ileostomy/Ileal Conduit: No       Date of Last BM: 3.14.20      Intake/Output Summary (Last 24 hours) at 3/13/2020 1035  Last data filed at 3/13/2020 1016  Gross per 24 hour   Intake 600 ml   Output --   Net 600 ml     I/O last 3 completed shifts: In: 600 [P.O.:600]  Out: -     Safety Concerns:      At Risk for Falls    Impairments/Disabilities:      None    Nutrition Therapy:  Current Nutrition Therapy:   - Oral Diet:  General    Routes of Feeding: Oral  Liquids: No Liquids  Daily Fluid Restriction: no  Last Modified Barium Swallow with Video (Video Swallowing Test): not done    Treatments

## 2020-03-13 NOTE — CARE COORDINATION
CM met with pt at bedside and spoke to pt's grandson over the phone to update them that [facility] is not accepting any visitors. Pt and Grandson are both OK with that and pt will likely d/c to [facility] tomorrow. Updated Matt Thompson with [facility].  CM following-Trina Neumann RN

## 2020-03-14 VITALS
OXYGEN SATURATION: 92 % | WEIGHT: 115 LBS | DIASTOLIC BLOOD PRESSURE: 75 MMHG | BODY MASS INDEX: 18.05 KG/M2 | TEMPERATURE: 98 F | RESPIRATION RATE: 16 BRPM | SYSTOLIC BLOOD PRESSURE: 134 MMHG | HEIGHT: 67 IN | HEART RATE: 75 BPM

## 2020-03-14 LAB
ANION GAP SERPL CALCULATED.3IONS-SCNC: 9 MMOL/L (ref 3–16)
BUN BLDV-MCNC: 21 MG/DL (ref 7–20)
CALCIUM SERPL-MCNC: 8.4 MG/DL (ref 8.3–10.6)
CHLORIDE BLD-SCNC: 103 MMOL/L (ref 99–110)
CO2: 26 MMOL/L (ref 21–32)
CREAT SERPL-MCNC: 0.6 MG/DL (ref 0.6–1.2)
GFR AFRICAN AMERICAN: >60
GFR NON-AFRICAN AMERICAN: >60
GLUCOSE BLD-MCNC: 93 MG/DL (ref 70–99)
HCT VFR BLD CALC: 23 % (ref 36–48)
HEMOGLOBIN: 7.5 G/DL (ref 12–16)
MCH RBC QN AUTO: 29.2 PG (ref 26–34)
MCHC RBC AUTO-ENTMCNC: 32.5 G/DL (ref 31–36)
MCV RBC AUTO: 89.8 FL (ref 80–100)
PDW BLD-RTO: 14.3 % (ref 12.4–15.4)
PLATELET # BLD: 123 K/UL (ref 135–450)
PMV BLD AUTO: 8.5 FL (ref 5–10.5)
POTASSIUM SERPL-SCNC: 4.2 MMOL/L (ref 3.5–5.1)
RBC # BLD: 2.57 M/UL (ref 4–5.2)
SODIUM BLD-SCNC: 138 MMOL/L (ref 136–145)
WBC # BLD: 5.1 K/UL (ref 4–11)

## 2020-03-14 PROCEDURE — 2700000000 HC OXYGEN THERAPY PER DAY

## 2020-03-14 PROCEDURE — 97530 THERAPEUTIC ACTIVITIES: CPT

## 2020-03-14 PROCEDURE — 94761 N-INVAS EAR/PLS OXIMETRY MLT: CPT

## 2020-03-14 PROCEDURE — 97535 SELF CARE MNGMENT TRAINING: CPT

## 2020-03-14 PROCEDURE — 6370000000 HC RX 637 (ALT 250 FOR IP): Performed by: PHYSICIAN ASSISTANT

## 2020-03-14 PROCEDURE — 36415 COLL VENOUS BLD VENIPUNCTURE: CPT

## 2020-03-14 PROCEDURE — 97110 THERAPEUTIC EXERCISES: CPT

## 2020-03-14 PROCEDURE — 80048 BASIC METABOLIC PNL TOTAL CA: CPT

## 2020-03-14 PROCEDURE — 85027 COMPLETE CBC AUTOMATED: CPT

## 2020-03-14 PROCEDURE — 97116 GAIT TRAINING THERAPY: CPT

## 2020-03-14 RX ADMIN — APIXABAN 2.5 MG: 2.5 TABLET, FILM COATED ORAL at 08:51

## 2020-03-14 ASSESSMENT — PAIN SCALES - GENERAL: PAINLEVEL_OUTOF10: 7

## 2020-03-14 ASSESSMENT — PAIN DESCRIPTION - PAIN TYPE: TYPE: SURGICAL PAIN

## 2020-03-14 ASSESSMENT — PAIN DESCRIPTION - LOCATION: LOCATION: KNEE

## 2020-03-14 ASSESSMENT — PAIN DESCRIPTION - ORIENTATION: ORIENTATION: RIGHT

## 2020-03-14 NOTE — CARE COORDINATION
CASE MANAGEMENT DISCHARGE SUMMARY      Discharge to: [facility]    Precertification completed:   Hospital Exemption Notification (HENS) completed:  yes    New Durable Medical Equipment ordered/agency:     Transportation:    Family/car:   Medical Transport explained to pt/family. Pt/family voice no agency preference. [facility]   time:1pm   Ambulance form completed: Yes    Confirmed discharge plan with:     Patient: yes     Family, name and contact number: message left for Ashley Ng [phone]     Facility/Agency, name:  [facility] faxed [phone]   Phone number for report to facility: [phone]     RN, name: Myles Merida    Note: Discharging nurse to complete [facility], reconcile [facility], and place final copy with patient's discharge packet. RN to ensure that written prescriptions for  Level II medications are sent with patient to the facility as per protocol.     Darryl Madrigal RN

## 2020-03-14 NOTE — PROGRESS NOTES
Physical Therapy  Facility/Department: [facility] C5 - MED SURG/ORTHO  Daily Treatment Note  NAME: Bruce Henriquez  : 1935  MRN: [id number]    Date of Service: 3/14/2020    Discharge Recommendations:  Subacute/Skilled Nursing Facility   PT Equipment Recommendations  Equipment Needed: No  Other: Defer to facility    Assessment   Body structures, Functions, Activity limitations: Decreased functional mobility ; Decreased ROM; Decreased strength;Decreased balance;Decreased posture;Decreased endurance  Assessment: Pt seen in am for first PT session, pt pleasant and agreeable and motivated to work with PT this am. Pt demonstrates slow progress towards stated goals. Pt completes t/f with modA to maxA, ambulates up to 25' with RW and Belkis/modA x 2. Gait training completed to increase safety and I within the home and to decrease gait deviations for decreased risk of falls. Therapeutic exercises completed to increase strength and increase I with functional mobility. Therapeutic activities completed to increase safety and I with functional mobility and decrease risk of falls. Treatment Diagnosis: Decreased ROM/strength RLE and (I) with mobility s/p R TKR  Specific instructions for Next Treatment: Progress ther ex and mobility as tolerated  Prognosis: Good  Decision Making: Medium Complexity  PT Education: Goals;PT Role;Plan of Care;General Safety;Home Exercise Program;Precautions;Orientation; Functional Mobility Training;Transfer Training;Disease Specific Education;Gait Training;Weight-bearing Education  Patient Education: Pt verbalizes understanding but requires some reinforcement. Barriers to Learning: cognition, pt verbalizes understanding, needs reinforcement   REQUIRES PT FOLLOW UP: Yes  Activity Tolerance  Activity Tolerance: Patient Tolerated treatment well;Patient limited by fatigue     Patient Diagnosis(es): The primary encounter diagnosis was S/P total knee arthroplasty, right.  A diagnosis of Primary osteoarthritis Orientation  Orientation  Overall Orientation Status: Within Functional Limits    Objective   Bed mobility  Supine to Sit: Unable to assess  Sit to Supine: Unable to assess  Comment: Pt seated in chair at start and end of session     Transfers  Sit to Stand: Maximum Assistance; Moderate Assistance  Stand to sit: Maximum Assistance; Moderate Assistance  Comment: Sit to/from stand commode to RW maxA, pt completes x 3 consecutive sit to/from stand from standard chair to RW with variable modA to maxA. Cues provided for hand/foot positioning and to facilitate anterior WS during sit to stand. Tactile cues at hip and trunk to facilitate improved hip flexion and assist to control descent d/t poor eccentric control     Ambulation  Ambulation?: Yes  WB Status: WBAT RLE  Ambulation 1  Surface: level tile  Device: Rolling Walker  Assistance: 2 Person assistance(Belkis/modA x 2)  Quality of Gait: Pt ambulates with step to gait pattern, decreased rosa, very narrow ROXIE, B decreased step length/height, B decreased toe clearance, toe walking B, B decreased TKE, forward flexed trunk, increased UE support. Pt moderately unsteady with Belkis to modA to maintain balance and intermittent Belkis to modA for AD management. Distance: 25' x 2  Comments: Pt demos difficulty with turning and stepping back towards surface during gait activities. Stairs/Curb  Stairs?: No        Balance  Posture: Fair  Sitting - Static: Good;-  Sitting - Dynamic: Fair  Standing - Static: Fair;-  Standing - Dynamic: Poor;+  Comments: Pt completes x 4 bout static standing balance with RW and modA to maxA for balance d/t increased posterior lean, cues provided to facilitate anterior WS to decrease level of asist required from therapist however pt continues to require modA to maxA to maintain. Pt maintains up to 2 minutes without LOB.      Exercises  Heelslides: x 15 RLE (seated towel slides)  Hip Flexion: Seated marches BLE x 15  Knee Long Arc Quad: Seated RLE x Time Out 1033         Minutes 41         Timed Code Treatment Minutes: 2001 Diana Farfan, PT, DPT

## 2020-03-14 NOTE — PROGRESS NOTES
up in chair)  Sit to Supine: Unable to assess(left up in chair with Physical Therapy)  Scooting: Moderate assistance  Transfers  Sit to stand: Maximum assistance  Stand to sit: Maximum assistance  Transfer Comments: max difficulty with leaning forward/flexing at hips for sit<-->stand transfers     Cognition  Overall Cognitive Status: Exceptions  Arousal/Alertness: Delayed responses to stimuli  Following Commands: Follows one step commands with repetition  Attention Span: Difficulty attending to directions; Difficulty dividing attention  Memory: Decreased recall of precautions  Safety Judgement: Decreased awareness of need for assistance  Problem Solving: Assistance required to generate solutions  Insights: Decreased awareness of deficits  Initiation: Requires cues for some  Sequencing: Requires cues for some              Plan   Plan  Times per week: 4-6x/ week   Current Treatment Recommendations: ROM, Functional Mobility Training, Balance Training, Self-Care / ADL, Safety Education & Training, Positioning           AM-PAC Score        AM-[facility] Inpatient Daily Activity Raw Score: 15 (03/14/20 1024)  AM-PAC Inpatient ADL T-Scale Score : 34.69 (03/14/20 1024)  ADL Inpatient CMS 0-100% Score: 56.46 (03/14/20 1024)  ADL Inpatient CMS G-Code Modifier : CK (03/14/20 1024)    Goals  Short term goals  Time Frame for Short term goals: 1 week 3/17/20  Short term goal 1: BSC t/f with min A x2 (ongoing toilet transfers max A of 1--3/13/20)  Short term goal 2: Toileting with mod A (ongoing- max A 3/13/20)  Short term goal 3: Bed mobility with mod A (GOAL MET 3/12/20)  Short term goal 4: Seated UB ADL with SBA (progressing- min A 3/11/20)  Short term goal 5: Verbalize understanding of safe car t/f technique after education by 3/15/20 (discontinue as pt will receive medical transport to SNF 3/12/20)  Patient Goals   Patient goals : \"Get better, stay in the hospital for 2 weeks. \"       Therapy Time   Individual Concurrent Group

## 2020-03-18 ENCOUNTER — TELEPHONE (OUTPATIENT)
Dept: ORTHOPEDIC SURGERY | Age: 85
End: 2020-03-18

## 2020-03-25 ENCOUNTER — TELEPHONE (OUTPATIENT)
Dept: ORTHOPEDIC SURGERY | Age: 85
End: 2020-03-25

## 2020-03-31 ENCOUNTER — TELEPHONE (OUTPATIENT)
Dept: ORTHOPEDIC SURGERY | Age: 85
End: 2020-03-31

## 2020-04-08 ENCOUNTER — TELEPHONE (OUTPATIENT)
Dept: INTERNAL MEDICINE CLINIC | Age: 85
End: 2020-04-08

## 2020-04-08 NOTE — TELEPHONE ENCOUNTER
SKILLED NURSING ANIBAL    VERBAL ORDERS TO PLACE PT, OT & SPEECH FOR PATIENT TO FOLLOW UP WITH PATIENT IN HER HOME SOON TO BE DISCHARGED FROM THE [facility] FOLLOWING R. KNEE SURGERY.     FAXED PRE-OP NOTES TO INTAKE [phone]

## 2020-04-20 ENCOUNTER — CARE COORDINATION (OUTPATIENT)
Dept: CASE MANAGEMENT | Age: 85
End: 2020-04-20

## 2020-04-20 PROCEDURE — 1111F DSCHRG MED/CURRENT MED MERGE: CPT | Performed by: INTERNAL MEDICINE

## 2020-04-20 NOTE — CARE COORDINATION
[facility] Transitions Initial Follow Up Call    Call within 2 business days of discharge: Yes    Patient: Luis Coleman Patient : 1935   MRN: [id number]  Reason for Admission: S/p rt knee arthroplasty  Discharge Date: 3/14/20 RARS: Readmission Risk Score: 9      Last Discharge [facility]       Complaint Diagnosis Description Type Department Provider    3/10/20  S/P total knee arthroplasty, right . .. Admission (Discharged) Candace Chau MD               Facility: [facility] [address] services provided:      1st attempt to make contact for a follow up call, LVM introducing self, role, and nature of the call. Contact information provided.  SETH Renae RN  Care Transition Nurse [phone]          Follow Up  Future Appointments   Date Time Provider Lan Alonzo   2020  1:20 PM Satnam Hughes MD [facility]       Robyn Pritchett, RN

## 2020-04-21 ENCOUNTER — TELEPHONE (OUTPATIENT)
Dept: INTERNAL MEDICINE CLINIC | Age: 85
End: 2020-04-21

## 2020-04-27 ENCOUNTER — TELEPHONE (OUTPATIENT)
Dept: INTERNAL MEDICINE CLINIC | Age: 85
End: 2020-04-27

## 2020-04-27 RX ORDER — CUSHION
EACH MISCELLANEOUS
Qty: 1 EACH | Refills: 0 | Status: SHIPPED | OUTPATIENT
Start: 2020-04-27 | End: 2020-05-21

## 2020-05-11 ENCOUNTER — TELEPHONE (OUTPATIENT)
Dept: INTERNAL MEDICINE CLINIC | Age: 85
End: 2020-05-11

## 2020-05-11 NOTE — TELEPHONE ENCOUNTER
I called & LVM with a nurse from [facility] regarding the patients personal care/bathing & if they know if she received her cushion and bedside commode.

## 2020-05-11 NOTE — TELEPHONE ENCOUNTER
Incoming call from Lilli Chacon at [facility], Lilli Chacon is asking if patient received her wheelchair cushion/bedside commode ordered through [facility]. Also states patient has brought to her attention that she is not getting any assistance from [facility] with personal care/bathing. Patient/Grace were under the impression that bathing was to be included in plan of care for the patient after her discharge from [facility]. Lilli Chacon also updated us with patient's emergency contact list, her daughter-in-law Thompson Draft, grandchildren Ritika and Cameron Lopez that have helped her in her home.

## 2020-05-12 NOTE — TELEPHONE ENCOUNTER
Received a call from Jorge Lake PT w/[facility] returned my call about Terrence Perez. Patient has not received a cushion for the wheelchair yet. (we will need to contact [facility])    Zulema Spring spoke with [facility]  that said insurance will not cover bedside commode (she has an older one in the home but she does not use it). Zulema Spring states he is the only skilled care coming to the patient's home, skilled nursing & OT have stopped (unsure if re-cert).

## 2020-05-18 RX ORDER — CUSHION
EACH MISCELLANEOUS
Qty: 1 EACH | Refills: 0 | Status: CANCELLED | OUTPATIENT
Start: 2020-05-18

## 2020-05-20 ENCOUNTER — TELEPHONE (OUTPATIENT)
Dept: INTERNAL MEDICINE CLINIC | Age: 85
End: 2020-05-20

## 2020-05-20 NOTE — TELEPHONE ENCOUNTER
Patient called back, I scheduled as a telephone follow up tomorrow afternoon, she does not know when daughter-in-law or granddaughter may stop by to assist with video visit. Patient prefer afternoons for phone call.

## 2020-05-21 ENCOUNTER — TELEPHONE (OUTPATIENT)
Dept: INTERNAL MEDICINE CLINIC | Age: 85
End: 2020-05-21

## 2020-05-21 ENCOUNTER — VIRTUAL VISIT (OUTPATIENT)
Dept: INTERNAL MEDICINE CLINIC | Age: 85
End: 2020-05-21
Payer: MEDICARE

## 2020-05-21 VITALS — WEIGHT: 125 LBS | BODY MASS INDEX: 19.62 KG/M2 | HEIGHT: 67 IN

## 2020-05-21 PROCEDURE — 99443 PR PHYS/QHP TELEPHONE EVALUATION 21-30 MIN: CPT | Performed by: INTERNAL MEDICINE

## 2020-05-21 RX ORDER — AMLODIPINE BESYLATE AND BENAZEPRIL HYDROCHLORIDE 5; 20 MG/1; MG/1
CAPSULE ORAL
Qty: 90 CAPSULE | Refills: 0 | Status: SHIPPED | OUTPATIENT
Start: 2020-05-21 | End: 2020-08-19 | Stop reason: SDUPTHER

## 2020-05-21 RX ORDER — CUSHION
EACH MISCELLANEOUS
Qty: 1 EACH | Refills: 0 | Status: SHIPPED
Start: 2020-05-21 | End: 2020-08-19

## 2020-05-21 RX ORDER — CUSHION
EACH MISCELLANEOUS
Qty: 1 EACH | Refills: 0 | Status: SHIPPED | OUTPATIENT
Start: 2020-05-21 | End: 2020-05-21 | Stop reason: SDUPTHER

## 2020-05-21 ASSESSMENT — LIFESTYLE VARIABLES: HOW OFTEN DO YOU HAVE A DRINK CONTAINING ALCOHOL: 0

## 2020-05-21 ASSESSMENT — PATIENT HEALTH QUESTIONNAIRE - PHQ9
SUM OF ALL RESPONSES TO PHQ QUESTIONS 1-9: 1
SUM OF ALL RESPONSES TO PHQ QUESTIONS 1-9: 1

## 2020-05-21 NOTE — TELEPHONE ENCOUNTER
Called Danielle Yarbrough at [employer] back. [facility] offers Skilled care / personal care but she was informed that they only have one person and they are in training so unable to assist the patient. She was informed that the patient does not qualify for  on Aging Passport for personal care assistance. Please advise if referral for skilled nursing/personal care is needed to send to [facility]. I will call them as well to touch base since Danielle Yarbrough has not heard back from them. Danielle Yarbrough states she has a bedside commode for the patient waiting for daughter-in-law to  or Danielle Yarbrough will drop off. We just need to send prescription for shower chair/cushion. I am scanning those in to upload to [employer].

## 2020-05-21 NOTE — TELEPHONE ENCOUNTER
Try calling daughter in law to see is she can help pt with a shower since she has not had one for over a month

## 2020-05-21 NOTE — TELEPHONE ENCOUNTER
LM for Chantell Springer regarding follow up telephone visit with patient today. Patient states she has not had a shower (any assistance with personal care) since returning home from the [facility].  has orders pend for Wheelchair cushion, shower chair, and bedside commode. Asked for call back if we need send referral to [facility] since they have elderly care services that may be able to assist the patient with personal care.

## 2020-05-21 NOTE — TELEPHONE ENCOUNTER
Dana Landis at [facility] informed wheelchair cushion not covered. Will check with senior services if able to get one or Karen Dykes next week may be able to send referral/order.

## 2020-05-21 NOTE — TELEPHONE ENCOUNTER
I called and spoke to Sydni Hebert?) at [facility] on Aging regarding personal care for patient. Their records show there was a referral from 2019 to \"passport\" for services that was never followed up on. She states they will reach out to patient for their assesment screening to see if she would be able to qualify for \"passport\" to come out to the house. (billed to Insurance)    Otherwise [facility] has some elderly care services that may be eligible for home visits & personal care.

## 2020-05-21 NOTE — TELEPHONE ENCOUNTER
Daughter in law is not on patient is on HIPPA so we can't discuss her personal care/concerns. I only added her as an emergency contact per [employer] that the original emergency contact is not around patient often/in town. Amparo Perales at [employer] has had some contact with patient's family & informed me in an earlier conversation that daughter in law is physically limited with what she can help with.

## 2020-05-21 NOTE — TELEPHONE ENCOUNTER
Dnaielle Spoon called again, daughter-in-law is picking up commode. Danielle Spoon called [place] [address] informed again that the patient isn't receiving skilled nursing care. Patient was asking for 2x week for 1.5 hours to assist with bathing, laundry,meals, etc.    Giselle Rothman [facility] [phone] skilled nursing. M regarding orders.

## 2020-05-21 NOTE — TELEPHONE ENCOUNTER
[phone] Sophie Shauna returned call- Requesting a call back from Junaid Nugent. Sophie Villatoro stated that she checked with [facility] and the person that gives baths is still in training. Sophie Villatoro states that they will go out there and give her a bath.

## 2020-05-21 NOTE — TELEPHONE ENCOUNTER
Order/prescription for shower chair sent to [facility]. I spoke with [facility] [phone] and was informed that they do not have a 4in cushion, there is a note that they spoke to someone in the patient home. The only additional cushion they could order is for patients with Ulcers. Insurance won't cover any additional cushion & if she were to order one & patient pay out of pocket the cost is higher than buying a cushion from a retailer ([employer]?). She recommends patient see if there is a cushio she can purchase from a retail store since [employer] will not cover. Per [facility] they also stated that insurance will not cover a wheelchair cushion.

## 2020-05-22 ENCOUNTER — TELEPHONE (OUTPATIENT)
Dept: INTERNAL MEDICINE CLINIC | Age: 85
End: 2020-05-22

## 2020-06-02 NOTE — TELEPHONE ENCOUNTER
Incoming call from Maya Varghese- follow up on shower chair through [facility]. Papito Romano MA called [facility] and was informed that shower chair is not covered by insurance it will be $75 out of pocket. Maya Varghese had reached out to [employer] to see if they had a shower chair/wheelchair cushion. Email for Chen Hayden at [place], [email or web address]

## 2020-06-04 ENCOUNTER — OFFICE VISIT (OUTPATIENT)
Dept: INTERNAL MEDICINE CLINIC | Age: 85
End: 2020-06-04
Payer: MEDICARE

## 2020-06-04 VITALS
OXYGEN SATURATION: 98 % | HEART RATE: 78 BPM | DIASTOLIC BLOOD PRESSURE: 68 MMHG | TEMPERATURE: 98.2 F | BODY MASS INDEX: 19.78 KG/M2 | HEIGHT: 67 IN | WEIGHT: 126 LBS | SYSTOLIC BLOOD PRESSURE: 124 MMHG

## 2020-06-04 LAB
HCT VFR BLD CALC: 38.8 % (ref 36–48)
HEMOGLOBIN: 12.3 G/DL (ref 12–16)

## 2020-06-04 PROCEDURE — 1123F ACP DISCUSS/DSCN MKR DOCD: CPT | Performed by: INTERNAL MEDICINE

## 2020-06-04 PROCEDURE — 1036F TOBACCO NON-USER: CPT | Performed by: INTERNAL MEDICINE

## 2020-06-04 PROCEDURE — G8427 DOCREV CUR MEDS BY ELIG CLIN: HCPCS | Performed by: INTERNAL MEDICINE

## 2020-06-04 PROCEDURE — G0439 PPPS, SUBSEQ VISIT: HCPCS | Performed by: INTERNAL MEDICINE

## 2020-06-04 PROCEDURE — G8420 CALC BMI NORM PARAMETERS: HCPCS | Performed by: INTERNAL MEDICINE

## 2020-06-04 PROCEDURE — 99214 OFFICE O/P EST MOD 30 MIN: CPT | Performed by: INTERNAL MEDICINE

## 2020-06-04 PROCEDURE — 1090F PRES/ABSN URINE INCON ASSESS: CPT | Performed by: INTERNAL MEDICINE

## 2020-06-04 PROCEDURE — 36415 COLL VENOUS BLD VENIPUNCTURE: CPT | Performed by: INTERNAL MEDICINE

## 2020-06-04 PROCEDURE — 4040F PNEUMOC VAC/ADMIN/RCVD: CPT | Performed by: INTERNAL MEDICINE

## 2020-06-04 PROCEDURE — G8399 PT W/DXA RESULTS DOCUMENT: HCPCS | Performed by: INTERNAL MEDICINE

## 2020-06-04 RX ORDER — MELOXICAM 15 MG/1
15 TABLET ORAL DAILY
Qty: 90 TABLET | Refills: 3 | Status: ON HOLD | OUTPATIENT
Start: 2020-06-04 | End: 2020-07-02 | Stop reason: HOSPADM

## 2020-06-04 ASSESSMENT — PATIENT HEALTH QUESTIONNAIRE - PHQ9
SUM OF ALL RESPONSES TO PHQ QUESTIONS 1-9: 0
SUM OF ALL RESPONSES TO PHQ QUESTIONS 1-9: 0

## 2020-06-04 ASSESSMENT — LIFESTYLE VARIABLES: HOW OFTEN DO YOU HAVE A DRINK CONTAINING ALCOHOL: 0

## 2020-06-04 NOTE — PROGRESS NOTES
Housekeeping, Laundry, Shopping, Transportation  ADL Interventions:  · Getting sponge bath once a week and housekeeping through [address] Maintenance Status  Immunization History   Administered Date(s) Administered    Influenza, Triv, inactivated, subunit, adjuvanted, IM (Fluad 65 yrs and older) 11/14/2019    PPD Test 12/17/2008    Pneumococcal Conjugate 13-valent (Birtha Pries) 10/18/2017    Pneumococcal Polysaccharide ([id number]) 12/31/1996, 12/17/2008    Td, unspecified formulation 12/17/2008        Health Maintenance   Topic Date Due    DTaP/Tdap/Td vaccine (1 - Tdap) [birth year]/1954    Shingles Vaccine (1 of 2) 02/09/1985    Annual Wellness Visit (AWV)  05/29/2019    Potassium monitoring  03/14/2021    Creatinine monitoring  03/14/2021    Flu vaccine  Completed    Pneumococcal 65+ years Vaccine  Completed    DEXA (modify frequency per FRAX score)  Addressed    Hepatitis A vaccine  Aged Out    Hepatitis B vaccine  Aged Out    Hib vaccine  Aged Out    Meningococcal (ACWY) vaccine  Aged Out     Recommendations for vivio Due: see orders and patient instructions/AVS.  . Recommended screening schedule for the next 5-10 years is provided to the patient in written form: see Patient Masood Voss was seen today for medicare awv, hypertension and leg swelling.     Diagnoses and all orders for this visit:    Routine general medical examination at a health care facility

## 2020-06-09 ENCOUNTER — TELEPHONE (OUTPATIENT)
Dept: INTERNAL MEDICINE CLINIC | Age: 85
End: 2020-06-09

## 2020-06-09 RX ORDER — CALCIUM CARBONATE/VITAMIN D3 600 MG-20
TABLET ORAL
Qty: 90 CAPSULE | Refills: 3 | Status: SHIPPED | OUTPATIENT
Start: 2020-06-09 | End: 2021-09-13

## 2020-06-27 ENCOUNTER — NURSE TRIAGE (OUTPATIENT)
Dept: OTHER | Facility: CLINIC | Age: 85
End: 2020-06-27

## 2020-06-27 ENCOUNTER — HOSPITAL ENCOUNTER (INPATIENT)
Age: 85
LOS: 5 days | Discharge: NURSING, REHAB OR LONG-TERM CARE | DRG: 378 | End: 2020-07-02
Attending: EMERGENCY MEDICINE | Admitting: INTERNAL MEDICINE
Payer: MEDICARE

## 2020-06-27 ENCOUNTER — APPOINTMENT (OUTPATIENT)
Dept: GENERAL RADIOLOGY | Age: 85
DRG: 378 | End: 2020-06-27
Payer: MEDICARE

## 2020-06-27 PROBLEM — K62.5 BLOOD PER RECTUM: Status: ACTIVE | Noted: 2020-06-27

## 2020-06-27 LAB
A/G RATIO: 1.6 (ref 1.1–2.2)
ABO/RH: NORMAL
ALBUMIN SERPL-MCNC: 3.8 G/DL (ref 3.4–5)
ALP BLD-CCNC: 95 U/L (ref 40–129)
ALT SERPL-CCNC: 9 U/L (ref 10–40)
AMORPHOUS: ABNORMAL /HPF
ANION GAP SERPL CALCULATED.3IONS-SCNC: 8 MMOL/L (ref 3–16)
ANTIBODY SCREEN: NORMAL
AST SERPL-CCNC: 13 U/L (ref 15–37)
BACTERIA: ABNORMAL /HPF
BASOPHILS ABSOLUTE: 0 K/UL (ref 0–0.2)
BASOPHILS RELATIVE PERCENT: 0.6 %
BILIRUB SERPL-MCNC: 0.3 MG/DL (ref 0–1)
BILIRUBIN URINE: ABNORMAL
BLOOD, URINE: ABNORMAL
BUN BLDV-MCNC: 30 MG/DL (ref 7–20)
CALCIUM SERPL-MCNC: 9 MG/DL (ref 8.3–10.6)
CHLORIDE BLD-SCNC: 100 MMOL/L (ref 99–110)
CLARITY: ABNORMAL
CO2: 27 MMOL/L (ref 21–32)
COLOR: YELLOW
CREAT SERPL-MCNC: 0.9 MG/DL (ref 0.6–1.2)
EOSINOPHILS ABSOLUTE: 0 K/UL (ref 0–0.6)
EOSINOPHILS RELATIVE PERCENT: 0.6 %
EPITHELIAL CELLS, UA: ABNORMAL /HPF (ref 0–5)
GFR AFRICAN AMERICAN: >60
GFR NON-AFRICAN AMERICAN: 59
GLOBULIN: 2.4 G/DL
GLUCOSE BLD-MCNC: 135 MG/DL (ref 70–99)
GLUCOSE URINE: NEGATIVE MG/DL
HCT VFR BLD CALC: 36.4 % (ref 36–48)
HEMOGLOBIN: 11.6 G/DL (ref 12–16)
INR BLD: 1.04 (ref 0.86–1.14)
KETONES, URINE: ABNORMAL MG/DL
LEUKOCYTE ESTERASE, URINE: ABNORMAL
LYMPHOCYTES ABSOLUTE: 0.8 K/UL (ref 1–5.1)
LYMPHOCYTES RELATIVE PERCENT: 12.5 %
MAGNESIUM: 2 MG/DL (ref 1.8–2.4)
MCH RBC QN AUTO: 28.3 PG (ref 26–34)
MCHC RBC AUTO-ENTMCNC: 31.9 G/DL (ref 31–36)
MCV RBC AUTO: 88.9 FL (ref 80–100)
MICROSCOPIC EXAMINATION: YES
MONOCYTES ABSOLUTE: 0.4 K/UL (ref 0–1.3)
MONOCYTES RELATIVE PERCENT: 6.7 %
MUCUS: ABNORMAL /LPF
NEUTROPHILS ABSOLUTE: 4.8 K/UL (ref 1.7–7.7)
NEUTROPHILS RELATIVE PERCENT: 79.6 %
NITRITE, URINE: NEGATIVE
OCCULT BLOOD DIAGNOSTIC: ABNORMAL
PDW BLD-RTO: 13.9 % (ref 12.4–15.4)
PH UA: 5 (ref 5–8)
PLATELET # BLD: 161 K/UL (ref 135–450)
PMV BLD AUTO: 9.1 FL (ref 5–10.5)
POTASSIUM SERPL-SCNC: 4.5 MMOL/L (ref 3.5–5.1)
PROTEIN UA: 30 MG/DL
PROTHROMBIN TIME: 12.1 SEC (ref 10–13.2)
RBC # BLD: 4.09 M/UL (ref 4–5.2)
RBC UA: >100 /HPF (ref 0–4)
SODIUM BLD-SCNC: 135 MMOL/L (ref 136–145)
SPECIFIC GRAVITY UA: 1.02 (ref 1–1.03)
TOTAL PROTEIN: 6.2 G/DL (ref 6.4–8.2)
TROPONIN: 0.07 NG/ML
TROPONIN: 0.08 NG/ML
TROPONIN: 0.09 NG/ML
URINE TYPE: ABNORMAL
UROBILINOGEN, URINE: 0.2 E.U./DL
WBC # BLD: 6.1 K/UL (ref 4–11)
WBC UA: ABNORMAL /HPF (ref 0–5)

## 2020-06-27 PROCEDURE — C9113 INJ PANTOPRAZOLE SODIUM, VIA: HCPCS | Performed by: INTERNAL MEDICINE

## 2020-06-27 PROCEDURE — 93005 ELECTROCARDIOGRAM TRACING: CPT | Performed by: EMERGENCY MEDICINE

## 2020-06-27 PROCEDURE — 81001 URINALYSIS AUTO W/SCOPE: CPT

## 2020-06-27 PROCEDURE — 84484 ASSAY OF TROPONIN QUANT: CPT

## 2020-06-27 PROCEDURE — 80053 COMPREHEN METABOLIC PANEL: CPT

## 2020-06-27 PROCEDURE — 86923 COMPATIBILITY TEST ELECTRIC: CPT

## 2020-06-27 PROCEDURE — 86900 BLOOD TYPING SEROLOGIC ABO: CPT

## 2020-06-27 PROCEDURE — 85610 PROTHROMBIN TIME: CPT

## 2020-06-27 PROCEDURE — 6370000000 HC RX 637 (ALT 250 FOR IP): Performed by: INTERNAL MEDICINE

## 2020-06-27 PROCEDURE — 86901 BLOOD TYPING SEROLOGIC RH(D): CPT

## 2020-06-27 PROCEDURE — 2580000003 HC RX 258: Performed by: INTERNAL MEDICINE

## 2020-06-27 PROCEDURE — 1200000000 HC SEMI PRIVATE

## 2020-06-27 PROCEDURE — 83735 ASSAY OF MAGNESIUM: CPT

## 2020-06-27 PROCEDURE — G0328 FECAL BLOOD SCRN IMMUNOASSAY: HCPCS

## 2020-06-27 PROCEDURE — 6360000002 HC RX W HCPCS: Performed by: INTERNAL MEDICINE

## 2020-06-27 PROCEDURE — 71045 X-RAY EXAM CHEST 1 VIEW: CPT

## 2020-06-27 PROCEDURE — P9016 RBC LEUKOCYTES REDUCED: HCPCS

## 2020-06-27 PROCEDURE — 85025 COMPLETE CBC W/AUTO DIFF WBC: CPT

## 2020-06-27 PROCEDURE — 99285 EMERGENCY DEPT VISIT HI MDM: CPT

## 2020-06-27 PROCEDURE — 86850 RBC ANTIBODY SCREEN: CPT

## 2020-06-27 RX ORDER — SODIUM CHLORIDE 0.9 % (FLUSH) 0.9 %
10 SYRINGE (ML) INJECTION EVERY 12 HOURS SCHEDULED
Status: DISCONTINUED | OUTPATIENT
Start: 2020-06-27 | End: 2020-07-02 | Stop reason: HOSPADM

## 2020-06-27 RX ORDER — SODIUM CHLORIDE 9 MG/ML
INJECTION, SOLUTION INTRAVENOUS CONTINUOUS
Status: DISCONTINUED | OUTPATIENT
Start: 2020-06-27 | End: 2020-06-28

## 2020-06-27 RX ORDER — TRAMADOL HYDROCHLORIDE 50 MG/1
50 TABLET ORAL EVERY 6 HOURS PRN
Status: DISCONTINUED | OUTPATIENT
Start: 2020-06-27 | End: 2020-07-02 | Stop reason: HOSPADM

## 2020-06-27 RX ORDER — ONDANSETRON 2 MG/ML
4 INJECTION INTRAMUSCULAR; INTRAVENOUS EVERY 6 HOURS PRN
Status: DISCONTINUED | OUTPATIENT
Start: 2020-06-27 | End: 2020-07-02 | Stop reason: HOSPADM

## 2020-06-27 RX ORDER — ACETAMINOPHEN 650 MG/1
650 SUPPOSITORY RECTAL EVERY 6 HOURS PRN
Status: DISCONTINUED | OUTPATIENT
Start: 2020-06-27 | End: 2020-07-02 | Stop reason: HOSPADM

## 2020-06-27 RX ORDER — PANTOPRAZOLE SODIUM 40 MG/10ML
40 INJECTION, POWDER, LYOPHILIZED, FOR SOLUTION INTRAVENOUS 2 TIMES DAILY
Status: DISCONTINUED | OUTPATIENT
Start: 2020-06-27 | End: 2020-07-01

## 2020-06-27 RX ORDER — IPRATROPIUM BROMIDE AND ALBUTEROL SULFATE 2.5; .5 MG/3ML; MG/3ML
1 SOLUTION RESPIRATORY (INHALATION) EVERY 4 HOURS PRN
Status: DISCONTINUED | OUTPATIENT
Start: 2020-06-27 | End: 2020-06-27

## 2020-06-27 RX ORDER — IPRATROPIUM BROMIDE AND ALBUTEROL SULFATE 2.5; .5 MG/3ML; MG/3ML
1 SOLUTION RESPIRATORY (INHALATION) EVERY 4 HOURS PRN
Status: DISCONTINUED | OUTPATIENT
Start: 2020-06-27 | End: 2020-07-02 | Stop reason: HOSPADM

## 2020-06-27 RX ORDER — ACETAMINOPHEN 325 MG/1
650 TABLET ORAL EVERY 6 HOURS PRN
Status: DISCONTINUED | OUTPATIENT
Start: 2020-06-27 | End: 2020-07-02 | Stop reason: HOSPADM

## 2020-06-27 RX ORDER — PROMETHAZINE HYDROCHLORIDE 25 MG/1
12.5 TABLET ORAL EVERY 6 HOURS PRN
Status: DISCONTINUED | OUTPATIENT
Start: 2020-06-27 | End: 2020-07-02 | Stop reason: HOSPADM

## 2020-06-27 RX ORDER — DOCUSATE SODIUM 100 MG/1
100 CAPSULE, LIQUID FILLED ORAL 2 TIMES DAILY
Status: DISCONTINUED | OUTPATIENT
Start: 2020-06-27 | End: 2020-07-02 | Stop reason: HOSPADM

## 2020-06-27 RX ORDER — POLYETHYLENE GLYCOL 3350 17 G/17G
17 POWDER, FOR SOLUTION ORAL DAILY PRN
Status: DISCONTINUED | OUTPATIENT
Start: 2020-06-27 | End: 2020-07-02 | Stop reason: HOSPADM

## 2020-06-27 RX ORDER — SODIUM CHLORIDE 0.9 % (FLUSH) 0.9 %
10 SYRINGE (ML) INJECTION PRN
Status: DISCONTINUED | OUTPATIENT
Start: 2020-06-27 | End: 2020-07-02 | Stop reason: HOSPADM

## 2020-06-27 RX ADMIN — TRAMADOL HYDROCHLORIDE 50 MG: 50 TABLET, FILM COATED ORAL at 19:52

## 2020-06-27 RX ADMIN — DOCUSATE SODIUM 100 MG: 100 CAPSULE, LIQUID FILLED ORAL at 22:23

## 2020-06-27 RX ADMIN — Medication 10 ML: at 17:54

## 2020-06-27 RX ADMIN — SODIUM CHLORIDE: 9 INJECTION, SOLUTION INTRAVENOUS at 19:41

## 2020-06-27 RX ADMIN — CEFTRIAXONE SODIUM 1 G: 1 INJECTION, POWDER, FOR SOLUTION INTRAMUSCULAR; INTRAVENOUS at 19:39

## 2020-06-27 RX ADMIN — SODIUM CHLORIDE: 9 INJECTION, SOLUTION INTRAVENOUS at 17:54

## 2020-06-27 RX ADMIN — PANTOPRAZOLE SODIUM 40 MG: 40 INJECTION, POWDER, FOR SOLUTION INTRAVENOUS at 19:41

## 2020-06-27 ASSESSMENT — ENCOUNTER SYMPTOMS
BACK PAIN: 0
SHORTNESS OF BREATH: 0
DIARRHEA: 0
ABDOMINAL PAIN: 0
NAUSEA: 1
COLOR CHANGE: 0
BLOOD IN STOOL: 1
VOMITING: 0
ANAL BLEEDING: 1

## 2020-06-27 ASSESSMENT — PAIN SCALES - GENERAL
PAINLEVEL_OUTOF10: 7
PAINLEVEL_OUTOF10: 10

## 2020-06-27 ASSESSMENT — PAIN DESCRIPTION - PAIN TYPE: TYPE: ACUTE PAIN

## 2020-06-27 ASSESSMENT — PAIN DESCRIPTION - LOCATION
LOCATION: ARM
LOCATION: SHOULDER

## 2020-06-27 ASSESSMENT — PAIN DESCRIPTION - ORIENTATION
ORIENTATION: RIGHT;LEFT
ORIENTATION: RIGHT

## 2020-06-27 NOTE — TELEPHONE ENCOUNTER
Reason for Disposition   SEVERE rectal bleeding (large blood clots; on and off, or constant bleeding)    Answer Assessment - Initial Assessment Questions  1. APPEARANCE of BLOOD: \"What color is it? \" \"Is it passed separately, on the surface of the stool, or mixed in with the stool? \"      Bright red   2. AMOUNT: \"How much blood was passed? \"       *No Answer*  3. FREQUENCY: \"How many times has blood been passed with the stools? \"       twice  4. ONSET: \"When was the blood first seen in the stools? \" (Days or weeks)       *No Answer*  5. DIARRHEA: \"Is there also some diarrhea? \" If so, ask: \"How many diarrhea stools were passed in past 24 hours? \"       *No Answer*  6. CONSTIPATION: \"Do you have constipation? \" If so, \"How bad is it? \"      *No Answer*  7. RECURRENT SYMPTOMS: \"Have you had blood in your stools before? \" If so, ask: \"When was the last time? \" and \"What happened that time? \"       *No Answer*  8. BLOOD THINNERS: \"Do you take any blood thinners? \" (e.g., Coumadin/warfarin, Pradaxa/dabigatran, aspirin)      aspirin  9. OTHER SYMPTOMS: \"Do you have any other symptoms? \"  (e.g., abdominal pain, vomiting, dizziness, fever)      Dizzy,   10. PREGNANCY: \"Is there any chance you are pregnant? \" \"When was your last menstrual period? \"        *No Answer*    Protocols used: RECTAL BLEEDING-ADULT-AH    Family to take pt to the ER for rectal bleeding.

## 2020-06-27 NOTE — PROGRESS NOTES
RESPIRATORY THERAPY ASSESSMENT    Name:  [facility] Record Number:  [id number]  Age: 80 y.o. Gender: female  : 1935  Today's Date:  2020  Room:  0208/0208-01    Assessment     Is the patient being admitted for a COPD or Asthma exacerbation? No   (If yes the patient will be seen every 4 hours for the first 24 hours and then reassessed)    Patient Admission Diagnosis      Allergies  Allergies   Allergen Reactions    Hydrochlorothiazide      Constipation and severe leg cramps       Minimum Predicted Vital Capacity:     n/a          Actual Vital Capacity:      n/a              Pulmonary History:Asthma  Home Oxygen Therapy:  room air  Home Respiratory Therapy:Albuterol   Current Respiratory Therapy:  Duoneb Q4H prn          Respiratory Severity Index(RSI)   Patients with orders for inhalation medications, oxygen, or any therapeutic treatment modality will be placed on Respiratory Protocol. They will be assessed with the first treatment and at least every 72 hours thereafter. The following severity scale will be used to determine frequency of treatment intervention.     Smoking History: Pulmonary Disease or Smoking History, Greater than 15 pack year = 2    Social History  Social History     Tobacco Use    Smoking status: Never Smoker    Smokeless tobacco: Never Used   Substance Use Topics    Alcohol use: No    Drug use: No       Recent Surgical History: None = 0  Past Surgical History  Past Surgical History:   Procedure Laterality Date    CARPAL TUNNEL RELEASE Right 2011    OPEN REDUCTION INTERNAL FIXATION RIGHT WRIST (RUSSO'S FRACTURE)    HYSTERECTOMY      TOTAL KNEE ARTHROPLASTY Right 3/10/2020    RIGHT TOTAL KNEE REPLACEMENT       KANG & NEPHEW performed by Ruben Martinez MD at [facility]       Level of Consciousness: Alert, Oriented, and Cooperative = 0    Level of Activity: Walking with assistance = 1    Respiratory Pattern: Regular Pattern; RR 8-20 = 0    Breath Sounds: Clear = 0    Sputum   ,  ,    Cough: Strong, spontaneous, non-productive = 0    Vital Signs   /89   Pulse 69   Temp 98 °F (36.7 °C) (Oral)   Resp 18   Ht 5' 7\" (1.702 m)   Wt 125 lb 9.6 oz (57 kg)   SpO2 98%   BMI 19.67 kg/m²   SPO2 (COPD values may differ): Greater than or equal to 92% on room air = 0    Peak Flow (asthma only): not applicable = 0    RSI: 0-4 = See once and convert to home regimen or discontinue        Plan       Goals: medication delivery if needed    Patient/caregiver was educated on the proper method of use for Respiratory Care Devices:  Yes      Level of patient/caregiver understanding able to:   ? Verbalize understanding   ? Demonstrate understanding       ? Teach back        ? Needs reinforcement       ? No available caregiver               ? Other:     Response to education:  Excellent     Is patient being placed on Home Treatment Regimen? Yes     Does the patient have everything they need prior to discharge? Yes     Comments: pt assessed, interviewed/chart reviewed    Plan of Care: Continue home regimen    Electronically signed by Katie Ruiz RCP on 6/27/2020 at 5:23 PM    Respiratory Protocol Guidelines     1. Assessment and treatment by Respiratory Therapy will be initiated for medication and therapeutic interventions upon initiation of aerosolized medication. 2. Physician will be contacted for respiratory rate (RR) greater than 35 breaths per minute. Therapy will be held for heart rate (HR) greater than 140 beats per minute, pending direction from physician. 3. Bronchodilators will be administered via Metered Dose Inhaler (MDI) with spacer when the following criteria are met:  a. Alert and cooperative     b. HR < 140 bpm  c. RR < 30 bpm                d. Can demonstrate a 2-3 second inspiratory hold  4. Bronchodilators will be administered via Hand Held Nebulizer [facility]) to patients when ANY of the following criteria are met  a.  Incognizant or uncooperative b. Patients treated with HHN at Home        c. Unable to demonstrate proper use of MDI with spacer     d. RR > 30 bpm   5. Bronchodilators will be delivered via Metered Dose Inhaler (MDI), HHN, Aerogen to intubated patients on mechanical ventilation. 6. Inhalation medication orders will be delivered and/or substituted as outlined below. Aerosolized Medications Ordering and Administration Guidelines:    1. All Medications will be ordered by a physician, and their frequency and/or modality will be adjusted as defined by the patients Respiratory Severity Index (RSI) score. 2. If the patient does not have documented COPD, consider discontinuing anticholinergics when RSI is less than 9.  3. If the bronchospasm worsens (increased RSI), then the bronchodilator frequency can be increased to a maximum of every 4 hours. If greater than every 4 hours is required, the physician will be contacted. 4. If the bronchospasm improves, the frequency of the bronchodilator can be decreased, based on the patient's RSI, but not less than home treatment regimen frequency. 5. Bronchodilator(s) will be discontinued if patient has a RSI less than 9 and has received no scheduled or as needed treatment for 72  Hrs. Patients Ordered on a Mucolytic Agent:    1. Must always be administered with a bronchodilator. 2. Discontinue if patient experiences worsened bronchospasm, or secretions have lessened to the point that the patient is able to clear them with a cough. Anti-inflammatory and Combination Medications:    1. If the patient lacks prior history of lung disease, is not using inhaled anti-inflammatory medication at home, and lacks wheezing by examination or by history for at least 24 hours, contact physician for possible discontinuation.

## 2020-06-27 NOTE — PROGRESS NOTES
4 Eyes Skin Assessment     The patient is being assess for   Admission    I agree that 2 RN's have performed a thorough Head to Toe Skin Assessment on the patient. ALL assessment sites listed below have been assessed. Areas assessed by both nurses:   [x]   Head, Face, and Ears   [x]   Shoulders, Back, and Chest, Abdomen  [x]   Arms, Elbows, and Hands   [x]   Coccyx, Sacrum, and Ischium  [x]   Legs, Feet, and Heels        Scattered scabs and bruises, notable abrasions to upper extremities. Abrasion to third toe of right foot, blanchable heels and coccyx. Dry lower extremities. **SHARE this note so that the co-signing nurse is able to place an eSignature**    Co-signer eSignature: Electronically signed by Charbel Moore on 6/27/20 at 5:13 PM EDT    Does the Patient have Skin Breakdown?   No          Oscar Prevention initiated:  No   Wound Care Orders initiated:  No      WOC nurse consulted for Pressure Injury (Stage 3,4, Unstageable, DTI, NWPT, Complex wounds)and New or Established Ostomies:  No      Primary Nurse eSignature: Electronically signed by Hao Saldivar RN on 6/27/20 at 5:11 PM EDT

## 2020-06-27 NOTE — ED NOTES
Dr lea called back on the phone with dr Jayson Jarvis now     Richard Grimm  06/27/20 [phone]

## 2020-06-27 NOTE — ED PROVIDER NOTES
[address] ED  EMERGENCY DEPARTMENT ENCOUNTER        Pt Name: Carmen Naranjo  MRN: [id number]  [place] 1935  Date of evaluation: 6/27/2020  Provider: Lexi Tineo MD  PCP: Leeroy Cornejo MD      [address] [place] [address]       Chief Complaint   Patient presents with    Rectal Bleeding     Pt states that she had azar blood with clots in stool today.  Diarrhea     Pt states she also had diarrhea today       HISTORY OFPRESENT ILLNESS   (Location/Symptom, Timing/Onset, Context/Setting, Quality, Duration, Modifying Factors,Severity)  Note limiting factors. Carmen Naranjo is a 80 y.o. female presenting today due to concern for noticing bright red blood per rectum when she went to have a bowel movement this morning. She denied any vomiting but was nauseated. No chest pain or shortness of breath. She had some lower abdominal pain earlier today but that is gone. No radiation of pain to the back. No falls or trauma. She denies being on any blood thinners. She denies any history of blood in the stool. She denies any other concerns at this time other than blood in the stool. REVIEW OF SYSTEMS    (2-9 systems for level 4, 10 or more for level 5)     Review of Systems   Constitutional: Negative for chills, diaphoresis, fatigue and fever. HENT: Negative for congestion. Respiratory: Negative for shortness of breath. Cardiovascular: Negative for chest pain. Gastrointestinal: Positive for anal bleeding, blood in stool and nausea. Negative for abdominal pain, diarrhea and vomiting. Genitourinary: Negative for flank pain. Musculoskeletal: Negative for back pain. Skin: Negative for color change. Neurological: Positive for tremors. Negative for dizziness, weakness, light-headedness and headaches. Psychiatric/Behavioral: Negative for confusion. Positives and Pertinent negatives as per HPI.       PASTMEDICAL HISTORY     Past Medical History:   Diagnosis Date    Anxiety 7/20/2012    Arthritis     Asthma     Carpal tunnel syndrome of left wrist 10/6/2011    Depression 5/2/2012    GERD (gastroesophageal reflux disease) 9/4/2013    Hematuria     chronic from stone    History of nonadherence to medical treatment 10/11/2014    Hypertension     Kidney stone     saw Dr Jerome - 6 cm stone    Thyroid disease     goiter    Tight ring on finger     unable to remove - left hand    Wears dentures     full upper    Wears glasses     for reading         SURGICAL HISTORY       Past Surgical History:   Procedure Laterality Date    CARPAL TUNNEL RELEASE Right 09/14/2011    OPEN REDUCTION INTERNAL FIXATION RIGHT WRIST (RUSSO'S FRACTURE)    HYSTERECTOMY      TOTAL KNEE ARTHROPLASTY Right 3/10/2020    RIGHT TOTAL KNEE REPLACEMENT       KANG & NEPHEW performed by Kamilla Koenig MD at [address] [facility]       Previous Medications    ALBUTEROL SULFATE  (90 BASE) MCG/ACT INHALER    Inhale 2 puffs into the lungs every 6 hours as needed for Wheezing    AMLODIPINE-BENAZEPRIL (LOTREL) 5-20 MG PER CAPSULE    TAKE 1 CAPSULE BY MOUTH EVERY DAY    ASPIRIN 325 MG EC TABLET    Take 325 mg by mouth every 4 hours as needed     CHOLECALCIFEROL (CVS D3) 50 MCG (2000 UT) CAPS    TAKE ONE CAPSULE BY MOUTH DAILY    MAGNESIUM HYDROXIDE (MILK OF MAGNESIA) 400 MG/5ML SUSPENSION    Take 30 mLs by mouth daily as needed for Constipation    MELOXICAM (MOBIC) 15 MG TABLET    Take 1 tablet by mouth daily    MISC. DEVICES (COMMODE BEDSIDE) MISC    Bedside commode for patient use. MISC. DEVICES (ROLLER WALKER) MISC    1 each by Does not apply route daily    MISC. DEVICES (WHEELCHAIR CUSHION) MISC    4 inch cushion for use on wheelchair. SENNA CO    Take 1 tablet by mouth as needed     UNABLE TO FIND    Shower chair for patient use.        ALLERGIES     Hydrochlorothiazide    FAMILY HISTORY       Family History   Problem Relation Age of Onset    Other Mother         scleroderma  Heart Disease Father         MI    Heart Attack Son 43    Diabetes Son           SOCIAL HISTORY       Social History     Socioeconomic History    Marital status:      Spouse name: None    Number of children: None    Years of education: None    Highest education level: None   Occupational History    None   Social Needs    Financial resource strain: None    Food insecurity     Worry: None     Inability: None    Transportation needs     Medical: None     Non-medical: None   Tobacco Use    Smoking status: Never Smoker    Smokeless tobacco: Never Used   Substance and Sexual Activity    Alcohol use: No    Drug use: No    Sexual activity: Never   Lifestyle    Physical activity     Days per week: None     Minutes per session: None    Stress: None   Relationships    Social connections     Talks on phone: None     Gets together: None     Attends Buddhism service: None     Active member of club or organization: None     Attends meetings of clubs or organizations: None     Relationship status: None    Intimate partner violence     Fear of current or ex partner: None     Emotionally abused: None     Physically abused: None     Forced sexual activity: None   Other Topics Concern    None   Social History Narrative    None       SCREENINGS    Flex Coma Scale  Eye Opening: Spontaneous  Best Verbal Response: Oriented  Best Motor Response: Obeys commands  Flex Coma Scale Score: 15           PHYSICAL EXAM    (up to 7 for level 4, 8 or more for level 5)     ED Triage Vitals [06/27/20 1359]   BP Temp Temp Source Pulse Resp SpO2 Height Weight   120/88 98 °F (36.7 °C) Oral 68 18 95 % 5' 7\" (1.702 m) 125 lb (56.7 kg)       Physical Exam  Vitals signs and nursing note reviewed. Exam conducted with a chaperone present Junaid Hunt RN = chaperone for rectal exam ). Constitutional:       General: She is awake. She is in acute distress (mild). Appearance: Normal appearance.  She is well-developed, well-groomed and underweight. She is not ill-appearing, toxic-appearing or diaphoretic. Interventions: She is not intubated. HENT:      Head: Normocephalic and atraumatic. Right Ear: External ear normal.      Left Ear: External ear normal.   Eyes:      General:         Right eye: No discharge. Left eye: No discharge. Neck:      Musculoskeletal: Normal range of motion and neck supple. Cardiovascular:      Rate and Rhythm: Normal rate and regular rhythm. Pulses: Normal pulses. Pulmonary:      Effort: Pulmonary effort is normal. No tachypnea, bradypnea, accessory muscle usage, prolonged expiration, respiratory distress or retractions. She is not intubated. Breath sounds: Normal breath sounds and air entry. No stridor, decreased air movement or transmitted upper airway sounds. No decreased breath sounds, wheezing, rhonchi or rales. Abdominal:      General: Abdomen is flat. Bowel sounds are normal.      Palpations: Abdomen is soft. Tenderness: There is no abdominal tenderness. There is no guarding or rebound. Negative signs include Cristina's sign, Rovsing's sign and McBurney's sign. Genitourinary:     Rectum: Guaiac result positive. No tenderness or external hemorrhoid. Musculoskeletal:         General: No deformity or signs of injury. Skin:     General: Skin is warm and dry. Findings: No bruising, burn or erythema. Neurological:      General: No focal deficit present. Mental Status: She is alert and oriented to person, place, and time. Mental status is at baseline. GCS: GCS eye subscore is 4. GCS verbal subscore is 5. GCS motor subscore is 6. Motor: Tremor (resting) present. No weakness, atrophy, abnormal muscle tone or seizure activity. Psychiatric:         Attention and Perception: Attention normal.         Mood and Affect: Affect normal. Mood is anxious. Speech: Speech normal.         Behavior: Behavior normal. Behavior is cooperative. DIAGNOSTIC RESULTS   :    Labs Reviewed   CBC WITH AUTO DIFFERENTIAL - Abnormal; Notable for the following components:       Result Value    Hemoglobin 11.6 (*)     Lymphocytes Absolute 0.8 (*)     All other components within normal limits    Narrative:     Performed at:  [facility],  [place]   Phone [phone]   COMPREHENSIVE METABOLIC PANEL - Abnormal; Notable for the following components:    Sodium 135 (*)     Glucose 135 (*)     BUN 30 (*)     GFR Non- 59 (*)     Total Protein 6.2 (*)     ALT 9 (*)     AST 13 (*)     All other components within normal limits    Narrative:     Performed at:  [facility],  [place]   Phone [phone]   BLOOD OCCULT STOOL DIAGNOSTIC - Abnormal; Notable for the following components:    Occult Blood Diagnostic   (*)     Value: Result: POSITIVE  Normal range: Negative      All other components within normal limits    Narrative:     ORDER#: [id number]                          ORDERED BY: Dorota Miner  SOURCE: Stool                              COLLECTED:  06/27/20 14:05  ANTIBIOTICS AT [place]MOND.:                      RECEIVED :  06/27/20 14:22  Performed at:  [facility]) - [facility],  [place]   Phone [phone]   TROPONIN - Abnormal; Notable for the following components:    Troponin 0.08 (*)     All other components within normal limits    Narrative:     Performed at:  [facility] [address],  [place]   Phone [phone]   PROTIME-INR    Narrative:     Performed at:  [facility] [address],  [place]   Phone [phone]   MAGNESIUM    Narrative:     Performed at:  [facility]) [facility],  [place]   Phone [phone]   URINALYSIS   TROPONIN   TROPONIN   TYPE AND SCREEN    Narrative:     Performed at:  [facility] - [facility]  [address],  [place], [place]   Phone [phone]       All other labs were within normal range or not returned asof this dictation. EKG: All EKG's are interpreted by the Emergency Department Physician who either signs or Co-signs this chart in the absence of a cardiologist.    The Ekg interpreted by me shows  normal sinus rhythm with a rate of 60  Axis is   Left axis deviation  QTc is  normal  Intervals and Durations are unremarkable. ST Segments: no acute change and nonspecific changes  No significant change from prior EKG dated - 2/25/20  No STEMI, some baseline artifact from resting tremor, but no obvious concerning findings at this time           RADIOLOGY:   Non-plain film images such as CT, Ultrasound and MRI are read by the radiologist. [email or web address] images are visualized and preliminarily interpreted by the  ED Provider with the belowfindings:        Interpretation per the Radiologist below, if available at the time of this note:    XR CHEST PORTABLE   Final Result   No radiographic evidence of acute pulmonary disease. PROCEDURES   Unless otherwise noted below, none     Procedures    CRITICAL CARE TIME   N/A    CONSULTS: Spoke with Dr. Lily Valadez for admission at [phone]. He plans to trend the troponin but it appears that it is chronically elevated. Spoke with Dr. Anthony Rangel at [phone].     IP CONSULT TO HOSPITALIST  IP CONSULT TO GI  IP CONSULT TO GI    EMERGENCY DEPARTMENT COURSE and DIFFERENTIAL DIAGNOSIS/MDM:   Vitals:    Vitals:    06/27/20 1359 06/27/20 1435 06/27/20 1535   BP: 120/88 128/72 (!) 145/83   Pulse: 68 62 65   Resp: 18 26 21   Temp: 98 °F (36.7 °C)     TempSrc: Oral     SpO2: 95% 96% 96%   Weight: 125 lb (56.7 kg)     Height: 5' 7\" (1.702 m)         Patient was given the following medications:  Medications - No data to

## 2020-06-27 NOTE — H&P
Hospital Medicine History & Physical      PCP: Margarita Jimenez MD    Date of Admission: 6/27/2020    Date of Service: Pt seen/examined on 6/27/2020 and Admitted to Inpatient. Chief Complaint: blood per rectum. History Of Present Illness: The patient is a 80 y.o. female w hx of chronic hematuria due to nephrolithiasis, depression, who presents w complaint of BRBPR. Pt reports she had a bloody BM this am - clots and azar blod, that she is worried filled the toilet bowl. Per patient, she spent the rest of the morning in the bathroom but not sure if she actually had any more bloody BMs since that one episode. She denies any abd pain. She reports she had to be taking mobic for her R shoulder pain and is worried this could have been contributory. In ED FOBt was found positive. Hgb reassuring at 11.6 abd hemodynamics stable.              Past Medical History:        Diagnosis Date    Anxiety 7/20/2012    Arthritis     Asthma     Carpal tunnel syndrome of left wrist 10/6/2011    Depression 5/2/2012    GERD (gastroesophageal reflux disease) 9/4/2013    Hematuria     chronic from stone    History of nonadherence to medical treatment 10/11/2014    Hypertension     Kidney stone     saw Dr Jerome - 6 cm stone    Thyroid disease     goiter    Tight ring on finger     unable to remove - left hand    Wears dentures     full upper    Wears glasses     for reading       Past Surgical History:        Procedure Laterality Date    CARPAL TUNNEL RELEASE Right 09/14/2011    OPEN REDUCTION INTERNAL FIXATION RIGHT WRIST (RUSSO'S FRACTURE)    HYSTERECTOMY      TOTAL KNEE ARTHROPLASTY Right 3/10/2020    RIGHT TOTAL KNEE REPLACEMENT       KANG & NEPHEW performed by Anna Veloz MD at [facility]       Medications Prior to Admission:    Prior to Admission medications Medication Sig Start Date End Date Taking? Authorizing Provider   Cholecalciferol (CVS D3) 50 MCG (2000 UT) CAPS TAKE ONE CAPSULE BY MOUTH DAILY 6/9/20  Yes Diane Harris MD   meloxicam (MOBIC) 15 MG tablet Take 1 tablet by mouth daily 6/4/20 9/2/20 Yes Tasia Harris MD   amLODIPine-benazepril (LOTREL) 5-20 MG per capsule TAKE 1 CAPSULE BY MOUTH EVERY DAY  Patient taking differently: daily Patient only takes if her systolic blood pressure is above 110. 5/21/20  Yes Diane Harris MD   albuterol sulfate  (90 Base) MCG/ACT inhaler Inhale 2 puffs into the lungs every 6 hours as needed for Wheezing 3/4/20  Yes Diane Harris MD   SENNA CO Take 1 tablet by mouth as needed    Yes Historical Provider, MD   magnesium hydroxide (MILK OF MAGNESIA) 400 MG/5ML suspension Take 30 mLs by mouth daily as needed for Constipation 8/29/18  Yes Tasia Harris MD   aspirin 325 MG EC tablet Take 325 mg by mouth every 4 hours as needed    Yes Historical Provider, MD   [address] [place] [address] chair for patient use. 5/21/20   Mumtaz Harris MD   [facility] Devices MERIT [facility] CUSHION) MISC 4 inch cushion for use on wheelchair. 5/21/20   Mumtaz Harris MD   [facility]. Devices (COMMODE BEDSIDE) [facility] Bedside commode for patient use. 5/21/20   Mumtaz Harris MD   [facility]. Devices ([place]) [facility] 1 each by Does not apply route daily 7/19/18   Mumtaz Harris MD       Allergies:  Hydrochlorothiazide    Social History:  The patient currently lives home   TOBACCO:   reports that she has never smoked. She has never used smokeless tobacco.  ETOH:   reports no history of alcohol use. Family History:  Reviewed in detail and negative for DM, Early CAD, Cancer, CVA. Positive as follows:        Problem Relation Age of Onset    Other Mother         scleroderma    Heart Disease Father         MI    Heart Attack Son 43    Diabetes Son        REVIEW OF SYSTEMS:   As noted in the HPI. All other systems reviewed and negative.     PHYSICAL EXAM:    /67 U/A:    Lab Results   Component Value Date    COLORU Yellow 06/27/2020    WBCUA 3-5 06/27/2020    RBCUA >100 06/27/2020    MUCUS 1+ 06/27/2020    BACTERIA 4+ 06/27/2020    CLARITYU CLOUDY 06/27/2020    SPECGRAV 1.025 06/27/2020    LEUKOCYTESUR TRACE 06/27/2020    BLOODU LARGE 06/27/2020    GLUCOSEU Negative 06/27/2020    AMORPHOUS 1+ 06/27/2020       ABG  No results found for: ZWR0GSV, BEART, N4ITEYCP, PHART, THGBART, HCU5WKD, PO2ART, EXU1DCE        Active Hospital Problems    Diagnosis Date Noted    Blood per rectum [K62.5] 06/27/2020         PHYSICIANS CERTIFICATION:    I certify that Calista Dela Cruz is expected to be hospitalized for more than 2 midnights based on the following assessment and plan:      ASSESSMENT/PLAN:        Anemia of acute blood loss. BRBPR. No emesis. On NSAID  Plan:    - PPI IV BID.    - GI eval.    - Hgb trend. - hold BP meds. - liquid diet and IVF support. HTN - hold PTA BP regimen. UTI POA - rocephin pending culture. R shoulder pain -   Start PRN tramadol. Avoid NSAIDs. DVT Prophylaxis: SCD  Diet: Diet NPO Effective Now  Code Status: Full Code    Dispo - inpatient. Alek Dominguez MD    Thank you Elza Sanchez MD for the opportunity to be involved in this patient's care. If you have any questions or concerns please feel free to contact me at [phone].

## 2020-06-28 LAB
BLOOD BANK DISPENSE STATUS: NORMAL
BLOOD BANK PRODUCT CODE: NORMAL
BPU ID: NORMAL
DESCRIPTION BLOOD BANK: NORMAL
EKG ATRIAL RATE: 60 BPM
EKG DIAGNOSIS: NORMAL
EKG P AXIS: 50 DEGREES
EKG P-R INTERVAL: 118 MS
EKG Q-T INTERVAL: 452 MS
EKG QRS DURATION: 102 MS
EKG QTC CALCULATION (BAZETT): 452 MS
EKG R AXIS: -85 DEGREES
EKG T AXIS: 45 DEGREES
EKG VENTRICULAR RATE: 60 BPM
GLUCOSE BLD-MCNC: 74 MG/DL (ref 70–99)
GLUCOSE BLD-MCNC: 83 MG/DL (ref 70–99)
HCT VFR BLD CALC: 25 % (ref 36–48)
HCT VFR BLD CALC: 25.9 % (ref 36–48)
HCT VFR BLD CALC: 26.1 % (ref 36–48)
HEMOGLOBIN: 7.7 G/DL (ref 12–16)
HEMOGLOBIN: 8.2 G/DL (ref 12–16)
HEMOGLOBIN: 8.4 G/DL (ref 12–16)
PERFORMED ON: NORMAL
PERFORMED ON: NORMAL

## 2020-06-28 PROCEDURE — 1200000000 HC SEMI PRIVATE

## 2020-06-28 PROCEDURE — 85014 HEMATOCRIT: CPT

## 2020-06-28 PROCEDURE — 6360000002 HC RX W HCPCS: Performed by: INTERNAL MEDICINE

## 2020-06-28 PROCEDURE — 6370000000 HC RX 637 (ALT 250 FOR IP): Performed by: INTERNAL MEDICINE

## 2020-06-28 PROCEDURE — C9113 INJ PANTOPRAZOLE SODIUM, VIA: HCPCS | Performed by: INTERNAL MEDICINE

## 2020-06-28 PROCEDURE — 85018 HEMOGLOBIN: CPT

## 2020-06-28 PROCEDURE — 2580000003 HC RX 258: Performed by: INTERNAL MEDICINE

## 2020-06-28 PROCEDURE — 36415 COLL VENOUS BLD VENIPUNCTURE: CPT

## 2020-06-28 PROCEDURE — 36430 TRANSFUSION BLD/BLD COMPNT: CPT

## 2020-06-28 PROCEDURE — 93010 ELECTROCARDIOGRAM REPORT: CPT | Performed by: INTERNAL MEDICINE

## 2020-06-28 PROCEDURE — P9016 RBC LEUKOCYTES REDUCED: HCPCS

## 2020-06-28 RX ORDER — 0.9 % SODIUM CHLORIDE 0.9 %
20 INTRAVENOUS SOLUTION INTRAVENOUS ONCE
Status: COMPLETED | OUTPATIENT
Start: 2020-06-28 | End: 2020-06-28

## 2020-06-28 RX ORDER — SODIUM CHLORIDE 9 MG/ML
INJECTION, SOLUTION INTRAVENOUS CONTINUOUS
Status: DISCONTINUED | OUTPATIENT
Start: 2020-06-28 | End: 2020-07-02

## 2020-06-28 RX ORDER — HYDROCORTISONE 25 MG/G
CREAM TOPICAL 2 TIMES DAILY
Status: DISCONTINUED | OUTPATIENT
Start: 2020-06-28 | End: 2020-07-02 | Stop reason: HOSPADM

## 2020-06-28 RX ADMIN — CEFTRIAXONE SODIUM 1 G: 1 INJECTION, POWDER, FOR SOLUTION INTRAMUSCULAR; INTRAVENOUS at 18:21

## 2020-06-28 RX ADMIN — PANTOPRAZOLE SODIUM 40 MG: 40 INJECTION, POWDER, FOR SOLUTION INTRAVENOUS at 20:05

## 2020-06-28 RX ADMIN — Medication 10 ML: at 09:26

## 2020-06-28 RX ADMIN — PANTOPRAZOLE SODIUM 40 MG: 40 INJECTION, POWDER, FOR SOLUTION INTRAVENOUS at 09:26

## 2020-06-28 RX ADMIN — HYDROCORTISONE 2.5%: 25 CREAM TOPICAL at 20:14

## 2020-06-28 RX ADMIN — SODIUM CHLORIDE 20 ML: 9 INJECTION, SOLUTION INTRAVENOUS at 15:51

## 2020-06-28 RX ADMIN — SODIUM CHLORIDE: 9 INJECTION, SOLUTION INTRAVENOUS at 09:26

## 2020-06-28 RX ADMIN — DOCUSATE SODIUM 100 MG: 100 CAPSULE, LIQUID FILLED ORAL at 09:26

## 2020-06-28 RX ADMIN — SODIUM CHLORIDE: 9 INJECTION, SOLUTION INTRAVENOUS at 15:28

## 2020-06-28 RX ADMIN — Medication 10 ML: at 20:05

## 2020-06-28 RX ADMIN — TRAMADOL HYDROCHLORIDE 50 MG: 50 TABLET, FILM COATED ORAL at 15:28

## 2020-06-28 RX ADMIN — SODIUM CHLORIDE: 9 INJECTION, SOLUTION INTRAVENOUS at 20:07

## 2020-06-28 ASSESSMENT — PAIN DESCRIPTION - ORIENTATION: ORIENTATION: RIGHT

## 2020-06-28 ASSESSMENT — PAIN DESCRIPTION - FREQUENCY: FREQUENCY: CONTINUOUS

## 2020-06-28 ASSESSMENT — PAIN DESCRIPTION - ONSET: ONSET: ON-GOING

## 2020-06-28 ASSESSMENT — PAIN - FUNCTIONAL ASSESSMENT: PAIN_FUNCTIONAL_ASSESSMENT: PREVENTS OR INTERFERES WITH MANY ACTIVE NOT PASSIVE ACTIVITIES

## 2020-06-28 ASSESSMENT — PAIN DESCRIPTION - PAIN TYPE: TYPE: ACUTE PAIN

## 2020-06-28 ASSESSMENT — PAIN DESCRIPTION - PROGRESSION: CLINICAL_PROGRESSION: NOT CHANGED

## 2020-06-28 ASSESSMENT — PAIN SCALES - GENERAL: PAINLEVEL_OUTOF10: 7

## 2020-06-28 ASSESSMENT — PAIN DESCRIPTION - DESCRIPTORS: DESCRIPTORS: ACHING

## 2020-06-28 ASSESSMENT — PAIN DESCRIPTION - LOCATION: LOCATION: SHOULDER

## 2020-06-28 NOTE — PROGRESS NOTES
Awake in bed. PM assessment complete. Laura meds given. Pt c/o pain right shoulder. Ultram given. Repositioned for comfort. Pt incont small amount dark stool. Laury care provided. Call light. Bed alarm. Will cont to monitor.  Danya Gardiner

## 2020-06-28 NOTE — FLOWSHEET NOTE
06/28/20 1700   Vitals   /69   Temp 97.2 °F (36.2 °C)   Temp Source Oral   Pulse 65   Resp 18   SpO2 94 %     No complications with blood transfusion.

## 2020-06-28 NOTE — PROGRESS NOTES
Occupational Therapy/Physical Therapy Attempt    Unit: [address]   Date:  6/28/2020  Patient Name:    Niels Grant  Admitting diagnosis:  Blood per rectum [K62.5]  Admit Date:  6/27/2020    Attempt @ 1407: Hold. Pt with elevated troponin and RN reports may need blood this afternoon. Requests hold at this time. Will await more information and attempt on 6/29/2020. Thank you. Prasanna Hanley, MOT, OTR/L   [id number]  Naveed Ferrell, MS PT, # [id number]    No Charge       Information obtained from OT/PT evaluation on 3/10/2020 and edited as needed. Preadmission Environment    Pt. Lives Alone  Home environment:  apartment   Steps to enter first floor:   1 steps to enter and hand rail      Steps to second floor: N/A  Bathroom:  Tub/Shower unit and standard toilet with raiser and handles  Equipment owned:  Rollator  and transport chair      Preadmission Status / PLOF:  History of falls   Yes  Pt. Able to drive   No  (Grandson provides transportation or she utilizes the senior bus)   Pt Fully independent with ADL's  No (aide assists with sponge bathe and changes her clothes 1x/wk)   Pt. Required assistance from family for:  Aide completes cleaning and laundry, family completes grocery shopping   Pt. Fully independent for transfers and gait and walked with: Rollator     Per chart review: In March 2019, pt reports she had difficulty getting up from chairs, therefore, spent her day seated at EOB or in bed. Pt also reports she would only bathe and change her clothes 1x/wk when the aide was present because she was unable to complete independently.

## 2020-06-28 NOTE — PROGRESS NOTES
06/28/20 0808   Vital Signs   Temp 99 °F (37.2 °C)   Temp Source Oral   Pulse 61   Heart Rate Source Monitor   Resp 16   BP (!) 107/58   BP Location Right upper arm   BP Upper/Lower Upper   Patient Position Semi fowlers   Level of Consciousness 0   MEWS Score 1   Oxygen Therapy   SpO2 95 %   O2 Device None (Room air)     Assessment completed. Medications given. Patient denies needs, has call light within reach. Patient is asking to eat, plan of care explained to patient and patient agreeable. No signs or symptoms of distress noted. Will continue to monitor.

## 2020-06-28 NOTE — PROGRESS NOTES
Received callback from Dr. Leila Holland and he informed me that he will be seeing the patient this evening.

## 2020-06-28 NOTE — PROGRESS NOTES
IMAGING:  Xr Chest Portable    Result Date: 6/27/2020  EXAMINATION: ONE XRAY VIEW OF THE CHEST 6/27/2020 2:33 pm COMPARISON: Chest x-ray dated 09/08/2018 HISTORY: ORDERING SYSTEM PROVIDED HISTORY: lightheadedness TECHNOLOGIST PROVIDED HISTORY: Reason for exam:->lightheadedness Reason for Exam: Rectal Bleeding (Pt states that she had azar blood with clots in stool today.) FINDINGS: HEART/MEDIASTINUM: The cardiac silhouette is enlarged, but stable. PLEURA/LUNGS: There are no focal consolidations or pleural effusions. There is no appreciable pneumothorax. BONES/SOFT TISSUE: No acute abnormality. No radiographic evidence of acute pulmonary disease. Hospital Problems           Last Modified POA    Blood per rectum 6/27/2020 Yes         Impression:  79 yo female with rectal bleeding    Recommendation:  1. Will try anusol cream, if no further bleeding follow up as outpatient.   If continued bleeding will attempt colonsocopy/flex sigmoidoscopy Tuesday morning      Zahira Raymond DO  5:58 PM 6/28/2020            [facility]    [address]      [address] [place] [address]     Phone: [phone]     Fax: [phone]

## 2020-06-28 NOTE — CONSULTS
Gastroenterology Consult Note    Patient:   Niels Grant   :    1935   Facility:     [address]  [address] [place] [address]   Referring/PCP: Elijah Chneg MD  Date:     2020  Consultant:   Yohan Jasso DO    Subjective: This 80 y.o. female was admitted 2020 with a diagnosis of \"Blood per rectum [K62.5]\" and is seen in consultation regarding rectal bleeding    81 yo female admitted with rectal bleeding. Has history of anal fissures and constipation in the past.  Seen a year ago for esophageal thickening and egd planned. Patient never had evaluation. Found to have rectal bleeding and + FOBT. Does not feel can do prep. Patient also has significant AAA measuring 3.2 cm seen in 2018.   Past Medical History:   Diagnosis Date    Anxiety 2012    Arthritis     Asthma     Carpal tunnel syndrome of left wrist 10/6/2011    Depression 2012    GERD (gastroesophageal reflux disease) 2013    Hematuria     chronic from stone    History of nonadherence to medical treatment 10/11/2014    Hypertension     Kidney stone     saw Dr Sedrick [facility] - 6 cm stone    Thyroid disease     goiter    Tight ring on finger     unable to remove - left hand    Wears dentures     full upper    Wears glasses     for reading     Past Surgical History:   Procedure Laterality Date    CARPAL TUNNEL RELEASE Right 2011    OPEN REDUCTION INTERNAL FIXATION RIGHT WRIST (RUSSO'S FRACTURE)    HYSTERECTOMY      TOTAL KNEE ARTHROPLASTY Right 3/10/2020    RIGHT TOTAL KNEE REPLACEMENT       KANG & NEPHEW performed by Karma Leon MD at [facility]:   Social History     Tobacco Use    Smoking status: Never Smoker    Smokeless tobacco: Never Used   Substance Use Topics    Alcohol use: No     Family:   Family History   Problem Relation Age of Onset    Other Mother         scleroderma    Heart Disease Father         MI    Heart Attack Son 43    Diabetes Son        Scheduled Medications:    sodium chloride flush  10 mL Intravenous 2 times per day    pantoprazole  40 mg Intravenous BID    cefTRIAXone (ROCEPHIN) IV  1 g Intravenous Q24H     Infusions:    sodium chloride 100 mL/hr at 06/27/20 1941     PRN Medications: sodium chloride flush, acetaminophen **OR** acetaminophen, polyethylene glycol, promethazine **OR** ondansetron, ipratropium-albuterol, traMADol  Allergies: Allergies   Allergen Reactions    Hydrochlorothiazide      Constipation and severe leg cramps       ROS:   Review of Systems    Objective:     Physical Exam:   Vitals:    06/27/20 1936   BP: 111/67   Pulse: 63   Resp: 18   Temp: 98.2 °F (36.8 °C)   SpO2: 97%     No intake/output data recorded.    General appearance: alert, appears stated age and cooperative  HEENT: PERRLA  Neck: no adenopathy, no carotid bruit, no JVD, supple, symmetrical, trachea midline and thyroid not enlarged, symmetric, no tenderness/mass/nodules  Lungs: clear to auscultation bilaterally  Heart: regular rate and rhythm, S1, S2 normal, no murmur, click, rub or gallop  Abdomen: soft, non-tender; bowel sounds normal; no masses,  no organomegaly  Extremities: extremities normal, atraumatic, no cyanosis or edema     Lab and Imaging Review   Labs:  CBC:   Recent Labs     06/27/20  1410   WBC 6.1   HGB 11.6*   HCT 36.4   MCV 88.9        BMP:   Recent Labs     06/27/20  1410   *   K 4.5      CO2 27   BUN 30*   CREATININE 0.9     LIVER PROFILE:   Recent Labs     06/27/20  1410   AST 13*   ALT 9*   PROT 6.2*   BILITOT 0.3   ALKPHOS 95     PT/INR:   Recent Labs     06/27/20  1410   INR 1.04       IMAGING:  Xr Chest Portable    Result Date: 6/27/2020  EXAMINATION: ONE XRAY VIEW OF THE CHEST 6/27/2020 2:33 pm COMPARISON: Chest x-ray dated 09/08/2018 HISTORY: ORDERING SYSTEM PROVIDED HISTORY: lightheadedness TECHNOLOGIST PROVIDED HISTORY: Reason for exam:->lightheadedness Reason for Exam: Rectal

## 2020-06-28 NOTE — PROGRESS NOTES
Progress Note    Admit Date:  6/27/2020    Subjective:  Ms. Sussy Sue with recurrent bright red blood per rectum  She appears very weak and fatigued. Objective:   BP (!) 115/58   Pulse 62   Temp 97.7 °F (36.5 °C) (Oral)   Resp 18   Ht 5' 7\" (1.702 m)   Wt 125 lb 9.6 oz (57 kg)   SpO2 96%   BMI 19.67 kg/m²       Intake/Output Summary (Last 24 hours) at 6/28/2020 1411  Last data filed at 6/28/2020 4955  Gross per 24 hour   Intake 881 ml   Output 400 ml   Net 481 ml         Physical Exam:  General: Elderly female , appears very fatigued and frail . awake, alert, oriented. NAD  Skin:  Warm and dry  Neck:  JVD absent. Neck supple  Chest:  Clear to auscultation, respiration easy. No wheezes, rales or rhonchi. Cardiovascular:  RRR ,S1S2 normal  Abdomen:  Soft, non tender, non distended, BS +  Extremities:  No edema. Intact peripheral pulses. Brisk cap refill, < 2 secs  Neuro: non focal      Medications:   Scheduled Meds:   sodium chloride flush  10 mL Intravenous 2 times per day    pantoprazole  40 mg Intravenous BID    cefTRIAXone (ROCEPHIN) IV  1 g Intravenous Q24H    docusate sodium  100 mg Oral BID       Continuous Infusions:      Data:  CBC:   Recent Labs     06/27/20  1410 06/28/20  0343 06/28/20  1341   WBC 6.1  --   --    RBC 4.09  --   --    HGB 11.6* 8.2* 7.7*   HCT 36.4 25.9* 25.0*   MCV 88.9  --   --    RDW 13.9  --   --      --   --      BMP:   Recent Labs     06/27/20  1410   *   K 4.5      CO2 27   BUN 30*   CREATININE 0.9     BNP: No results for input(s): BNP in the last 72 hours. PT/INR:   Recent Labs     06/27/20  1410   PROTIME 12.1   INR 1.04     APTT: No results for input(s): APTT in the last 72 hours.   CARDIAC ENZYMES:   Recent Labs     06/27/20  1410 06/27/20  1600 06/27/20  2251   TROPONINI 0.08* 0.07* 0.09*     FASTING LIPID PANEL:  Lab Results   Component Value Date    CHOL 150 01/20/2018    HDL 47 01/20/2018    TRIG 56 01/20/2018     LIVER PROFILE:   Recent Labs     06/27/20  1410   AST 13*   ALT 9*   BILITOT 0.3   ALKPHOS 95        Radiology  XR CHEST PORTABLE   Final Result   No radiographic evidence of acute pulmonary disease. Assessment:  Active Problems:    Blood per rectum  Resolved Problems:    * No resolved hospital problems. *      Plan:    GI Bleed  Acute blood loss anemia   Patient continues to have bright red blood per rectum  No emesis. On NSAID--> DC  Continue PPI IV BID. Monitor H&H, transfuse 1 unit of packed red blood cells  GI consulted, will need endoscopic evaluation   NPO  IVF              HTN   - hold PTA BP regimen.      UTI  - POA   - On  Rocephin, pending culture.      R shoulder pain   -  PRN tramadol. Avoid NSAIDs.             DVT Prophylaxis: SCD  Diet: Diet NPO Effective Now  Code Status: Full Code     Dispo - inpatient.        Tommie Chong MD 6/28/2020 2:11 PM

## 2020-06-28 NOTE — PROGRESS NOTES
06/28/20 1530   Vitals   /63   Temp 97.8 °F (36.6 °C)   Temp Source Oral   Pulse 63   Resp 18   SpO2 98 %     Vitals as above within 15 minutes of blood transfusion's start.

## 2020-06-28 NOTE — PLAN OF CARE
Problem: Falls - Risk of:  Goal: Will remain free from falls  Description: Will remain free from falls  Outcome: Ongoing  Goal: Absence of physical injury  Description: Absence of physical injury  Outcome: Ongoing     Problem: Skin Integrity:  Goal: Will show no infection signs and symptoms  Description: Will show no infection signs and symptoms  Outcome: Ongoing  Goal: Absence of new skin breakdown  Description: Absence of new skin breakdown  Outcome: Ongoing     Problem: Pain:  Goal: Pain level will decrease  Description: Pain level will decrease  Outcome: Ongoing  Goal: Control of acute pain  Description: Control of acute pain  Outcome: Ongoing  Goal: Control of chronic pain  Description: Control of chronic pain  Outcome: Ongoing

## 2020-06-28 NOTE — PROGRESS NOTES
Resting quietly in bed. resp e/e. No s/s distress noted. Call light in reach. Bed alarm.  Brandie Tolbert

## 2020-06-28 NOTE — FLOWSHEET NOTE
06/28/20 1830   Vitals   /68   Temp 97.7 °F (36.5 °C)   Temp Source Oral   Pulse 71   Resp 18   SpO2 95 %     Blood transfusion completed. No signs or symptoms of distress noted.

## 2020-06-28 NOTE — PROGRESS NOTES
Called Yoandy SALINAS to report to the MD on call that patient's Hemoglobin and Hematocrit are dropping and that the house doctor ordered a unit of blood and that that is currently infusing.

## 2020-06-29 ENCOUNTER — APPOINTMENT (OUTPATIENT)
Dept: GENERAL RADIOLOGY | Age: 85
DRG: 378 | End: 2020-06-29
Payer: MEDICARE

## 2020-06-29 LAB
HCT VFR BLD CALC: 22 % (ref 36–48)
HCT VFR BLD CALC: 23.7 % (ref 36–48)
HCT VFR BLD CALC: 24.1 % (ref 36–48)
HCT VFR BLD CALC: 25.6 % (ref 36–48)
HEMOGLOBIN: 7 G/DL (ref 12–16)
HEMOGLOBIN: 7.5 G/DL (ref 12–16)
HEMOGLOBIN: 7.7 G/DL (ref 12–16)
HEMOGLOBIN: 8 G/DL (ref 12–16)

## 2020-06-29 PROCEDURE — 97530 THERAPEUTIC ACTIVITIES: CPT

## 2020-06-29 PROCEDURE — 73030 X-RAY EXAM OF SHOULDER: CPT

## 2020-06-29 PROCEDURE — 6360000002 HC RX W HCPCS: Performed by: INTERNAL MEDICINE

## 2020-06-29 PROCEDURE — 85018 HEMOGLOBIN: CPT

## 2020-06-29 PROCEDURE — 2580000003 HC RX 258: Performed by: INTERNAL MEDICINE

## 2020-06-29 PROCEDURE — 36415 COLL VENOUS BLD VENIPUNCTURE: CPT

## 2020-06-29 PROCEDURE — 6370000000 HC RX 637 (ALT 250 FOR IP): Performed by: INTERNAL MEDICINE

## 2020-06-29 PROCEDURE — 1200000000 HC SEMI PRIVATE

## 2020-06-29 PROCEDURE — C9113 INJ PANTOPRAZOLE SODIUM, VIA: HCPCS | Performed by: INTERNAL MEDICINE

## 2020-06-29 PROCEDURE — 99232 SBSQ HOSP IP/OBS MODERATE 35: CPT | Performed by: INTERNAL MEDICINE

## 2020-06-29 PROCEDURE — 85014 HEMATOCRIT: CPT

## 2020-06-29 PROCEDURE — 97162 PT EVAL MOD COMPLEX 30 MIN: CPT

## 2020-06-29 PROCEDURE — 97116 GAIT TRAINING THERAPY: CPT

## 2020-06-29 PROCEDURE — 97166 OT EVAL MOD COMPLEX 45 MIN: CPT

## 2020-06-29 RX ORDER — SODIUM PHOSPHATE, DIBASIC AND SODIUM PHOSPHATE, MONOBASIC 7; 19 G/133ML; G/133ML
1 ENEMA RECTAL ONCE
Status: COMPLETED | OUTPATIENT
Start: 2020-06-29 | End: 2020-06-29

## 2020-06-29 RX ADMIN — TRAMADOL HYDROCHLORIDE 50 MG: 50 TABLET, FILM COATED ORAL at 14:02

## 2020-06-29 RX ADMIN — SODIUM CHLORIDE: 9 INJECTION, SOLUTION INTRAVENOUS at 21:49

## 2020-06-29 RX ADMIN — PANTOPRAZOLE SODIUM 40 MG: 40 INJECTION, POWDER, FOR SOLUTION INTRAVENOUS at 08:23

## 2020-06-29 RX ADMIN — Medication 10 ML: at 21:51

## 2020-06-29 RX ADMIN — SODIUM CHLORIDE: 9 INJECTION, SOLUTION INTRAVENOUS at 10:59

## 2020-06-29 RX ADMIN — HYDROCORTISONE 2.5%: 25 CREAM TOPICAL at 08:26

## 2020-06-29 RX ADMIN — CEFTRIAXONE SODIUM 1 G: 1 INJECTION, POWDER, FOR SOLUTION INTRAMUSCULAR; INTRAVENOUS at 18:37

## 2020-06-29 RX ADMIN — Medication 10 ML: at 08:23

## 2020-06-29 RX ADMIN — TRAMADOL HYDROCHLORIDE 50 MG: 50 TABLET, FILM COATED ORAL at 23:10

## 2020-06-29 RX ADMIN — PANTOPRAZOLE SODIUM 40 MG: 40 INJECTION, POWDER, FOR SOLUTION INTRAVENOUS at 21:51

## 2020-06-29 RX ADMIN — SODIUM PHOSPHATE 1 ENEMA: 7; 19 ENEMA RECTAL at 18:38

## 2020-06-29 RX ADMIN — HYDROCORTISONE 2.5%: 25 CREAM TOPICAL at 21:53

## 2020-06-29 ASSESSMENT — PAIN DESCRIPTION - ORIENTATION
ORIENTATION: RIGHT;LEFT
ORIENTATION: RIGHT

## 2020-06-29 ASSESSMENT — PAIN DESCRIPTION - FREQUENCY
FREQUENCY: CONTINUOUS
FREQUENCY: CONTINUOUS

## 2020-06-29 ASSESSMENT — PAIN SCALES - GENERAL
PAINLEVEL_OUTOF10: 0
PAINLEVEL_OUTOF10: 5
PAINLEVEL_OUTOF10: 0
PAINLEVEL_OUTOF10: 0
PAINLEVEL_OUTOF10: 10
PAINLEVEL_OUTOF10: 8

## 2020-06-29 ASSESSMENT — PAIN DESCRIPTION - LOCATION
LOCATION: HAND;SHOULDER
LOCATION: SHOULDER

## 2020-06-29 ASSESSMENT — PAIN DESCRIPTION - PAIN TYPE
TYPE: ACUTE PAIN
TYPE: CHRONIC PAIN

## 2020-06-29 ASSESSMENT — PAIN DESCRIPTION - PROGRESSION: CLINICAL_PROGRESSION: GRADUALLY WORSENING

## 2020-06-29 ASSESSMENT — PAIN DESCRIPTION - DESCRIPTORS
DESCRIPTORS: CONSTANT;DISCOMFORT
DESCRIPTORS: ACHING

## 2020-06-29 ASSESSMENT — PAIN DESCRIPTION - ONSET
ONSET: ON-GOING
ONSET: ON-GOING

## 2020-06-29 NOTE — PROGRESS NOTES
Given rectal enema per MD order. Dark bloody liquid stool noted. Consent signed and placed in chart for EGD in am. NO other needs at present time.

## 2020-06-29 NOTE — PLAN OF CARE
Problem: Falls - Risk of:  Goal: Will remain free from falls  Description: Will remain free from falls  6/28/2020 2238 by Baron Diony RN  Outcome: Ongoing  6/28/2020 0942 by Alyssa Cardoza RN  Outcome: Ongoing  Goal: Absence of physical injury  Description: Absence of physical injury  6/28/2020 2238 by Baron Diony RN  Outcome: Ongoing  6/28/2020 0942 by Alyssa Cardoza RN  Outcome: Ongoing     Problem: Skin Integrity:  Goal: Will show no infection signs and symptoms  Description: Will show no infection signs and symptoms  6/28/2020 2238 by Baron Diony RN  Outcome: Ongoing  6/28/2020 0942 by Alyssa Cardoza RN  Outcome: Ongoing  Goal: Absence of new skin breakdown  Description: Absence of new skin breakdown  6/28/2020 2238 by Baron Diony RN  Outcome: Ongoing  6/28/2020 0942 by Alyssa Cardoza RN  Outcome: Ongoing     Problem: Pain:  Goal: Pain level will decrease  Description: Pain level will decrease  6/28/2020 2238 by Baron Diony RN  Outcome: Ongoing  6/28/2020 0942 by Alyssa Cardoza RN  Outcome: Ongoing  Goal: Control of acute pain  Description: Control of acute pain  6/28/2020 2238 by Baron Diony RN  Outcome: Ongoing  6/28/2020 0942 by Alyssa Cardoza RN  Outcome: Ongoing  Goal: Control of chronic pain  Description: Control of chronic pain  6/28/2020 2238 by Baron Diony RN  Outcome: Ongoing  6/28/2020 0942 by Alyssa Cardoza RN  Outcome: Ongoing     Problem: Discharge Planning:  Goal: Discharged to appropriate level of care  Description: Discharged to appropriate level of care  Outcome: Ongoing     Problem:  Bowel Function - Altered:  Goal: Bowel elimination is within specified parameters  Description: Bowel elimination is within specified parameters  Outcome: Ongoing     Problem: Fluid Volume - Imbalance:  Goal: Will show no signs and symptoms of excessive bleeding  Description: Will show no signs and symptoms of excessive bleeding  Outcome: Ongoing  Goal: Absence of imbalanced fluid volume signs and symptoms  Description: Absence of imbalanced fluid volume signs and symptoms  Outcome: Ongoing     Problem: Nausea/Vomiting:  Goal: Absence of nausea/vomiting  Description: Absence of nausea/vomiting  Outcome: Ongoing

## 2020-06-29 NOTE — PROGRESS NOTES
CO2 27   BUN 30*   CREATININE 0.9     BNP: No results for input(s): BNP in the last 72 hours. PT/INR:   Recent Labs     06/27/20  1410   PROTIME 12.1   INR 1.04     APTT: No results for input(s): APTT in the last 72 hours. CARDIAC ENZYMES:   Recent Labs     06/27/20  1410 06/27/20  1600 06/27/20  2251   TROPONINI 0.08* 0.07* 0.09*     FASTING LIPID PANEL:  Lab Results   Component Value Date    CHOL 150 01/20/2018    HDL 47 01/20/2018    TRIG 56 01/20/2018     LIVER PROFILE:   Recent Labs     06/27/20  1410   AST 13*   ALT 9*   BILITOT 0.3   ALKPHOS 95        Radiology  XR CHEST PORTABLE   Final Result   No radiographic evidence of acute pulmonary disease. Assessment:  Active Problems:    Blood per rectum  Resolved Problems:    * No resolved hospital problems. *      Plan:    GI Bleed  Acute blood loss anemia   - reports no Blood since yesterday.   - h.h dropped from 11 to 8 , given 1 unit PRBc. but stable over last 24 hrs  - unsure if it was diverticular vs hemorrhoidal - anusol prn   - GI consulted. Hold Nsaids   NPO  IVF              HTN   - hold PTA BP regimen.      UTI  - POA   - On  Rocephin, pending culture.      R shoulder pain- check xray    -  PRN tramadol.    Avoid NSAIDs.      Possible parkinsons disease  - prince pill rolling and resting tremor with rigidity R>left  - refer to neurology at dc        DVT Prophylaxis: SCD  Diet: Diet NPO Effective Now  Code Status: Full Code     Dispo - inpatient.        Debbie Londono MD 6/29/2020 7:58 AM

## 2020-06-29 NOTE — PROGRESS NOTES
Inpatient Physical Therapy Evaluation and Treatment    Unit: [address]  Date:  6/29/2020  Patient Name:    Christopher Akers  Admitting diagnosis:  Blood per rectum [K62.5]  Admit Date:  6/27/2020  Precautions/Restrictions/WB Status/ Lines/ Wounds/ Oxygen: Fall risk, Bed/chair alarm, Lines -IV and Purewick catheter, Telemetry and tremor BUE, acute R shoulder pain (MD made aware), toe walking, bradykinesia with delayed processing    Treatment Time:  830-903  Treatment Number:  1   Timed Code Treatment Minutes: 23 minutes  Total Treatment Minutes:  30  minutes    Patient Goals for Therapy: \" to go home \"          Discharge Recommendations: SNF  DME needs for discharge: [facility] and hospital bed       Therapy recommendation for EMS Transport: requires transport by cot due to difficulty with trunk control    Therapy recommendations for staff:   Assist of 1 with use of RAIN STEDY for all transfers to/from [facility]  to/from Psychiatric    History of Present Illness:   Per H&P dated 6/27/20:  \"The patient is a 80 y.o. female w hx of chronic hematuria due to nephrolithiasis, depression, who presents w complaint of BRBPR. Pt reports she had a bloody BM this am - clots and azar blod, that she is worried filled the toilet bowl. Per patient, she spent the rest of the morning in the bathroom but not sure if she actually had any more bloody BMs since that one episode. She denies any abd pain. She reports she had to be taking mobic for her R shoulder pain and is worried this could have been contributory. In ED FOBt was found positive. Hgb reassuring at 11.6 abd hemodynamics stable. \"    Home Health S4 Level Recommendation:  Level 3 Safety  AM-PAC Mobility Score    AM-PAC Inpatient Mobility Raw Score : 9     Preadmission Environment    Pt.  Lives Alone  Home environment:    apartment   Steps to enter first floor:   1 steps to enter and hand rail         Steps to second floor: N/A  Bathroom:       Tub/Shower unit and standard toilet with raiser and handles  Equipment owned:      Rollator  and transport chair       Preadmission Status / PLOF:  History of falls             Yes  Pt. Able to drive          No  (Grandson provides transportation or she utilizes the senior bus)   Pt Fully independent with ADL's         No (aide assists with sponge bathe and changes her clothes 1x/wk)   Pt. Required assistance from family for:  Aide completes cleaning and laundry, family completes grocery shopping   Pt. Fully independent for transfers and gait and walked with: Rollator      Per chart review: In March 2019, pt reports she had difficulty getting up from chairs, therefore, spent her day seated at EOB or in bed. Pt also reports she would only bathe and change her clothes 1x/wk when the aide was present because she was unable to complete independently. Pain   Yes  Location: R shoulder (acute) however pt does report previously dx with \"torn cartilage\" in the past but never affected function  Rating: severe /10  Pain Medicine Status: No request made    Cognition    A&O x4   Able to follow 1 step commands    Subjective  Patient lying supine in bed with no family present. Pt agreeable to this PT eval & tx. Upper Extremity ROM/Strength  Please see OT evaluation.       Lower Extremity ROM / Strength   AROM WFL: No  ROM limitations: B knees limited to approximately 95 degrees flexion; R shoulder elevation to approx 20 degrees    Strength Assessment (measured on a 0-5 scale):  R LE   Quad   4-   Ant Tib  4   Hamstring 4-   Iliopsoas 4-  L LE  Quad   4-   Ant Tib  4   Hamstring 4-   Iliopsoas 4-    Lower Extremity Sensation    WFL    Lower Extremity Proprioception:   Diminished    Coordination and Tone  Impaired - rigidity noted in all extremities; pill rolling noted in L hand, R resting tremor, toe walking, delayed motor planning noted, retropulsion upon standing - MD made aware of neurological signs as patient reports she does not see a neurologist at baseline    Balance  Sitting:  Fair +; CGA  Comments: sitting EOB with cues for anterior weight shift    Standing: Poor; Mod A 2 people - retropulsion  Comments: required cues for anterior weightshifting to walker    Bed Mobility   Supine to Sit:    Max A   Sit to Supine:   Not Tested  Rolling: Max A   Scooting in sitting: Max A   Scooting in supine:  Not Tested    Transfer Training     Sit to stand: Mod A  and 2 persons  Stand to sit:   Mod A  and 2 persons  Bed to Chair:   Mod A  and 2 persons with use of rolling walker (RW)    Gait gait completed as indicated below  Distance:      5 ft  Deviations (firm surface/linoleum):  decreased rosa, narrow ROXIE, forward flexed posture, shuffles, parkinsonian pattern and decreased step length bilaterally  Assistive Device Used:    rolling walker (RW)  Level of Assist:    Mod A  and 2 persons  Comment: cues needed for motor planning, bradykinesia and rigidity noted throughout with significant toe walking noted    Stair Training deferred, pt unsafe/ not appropriate to complete stairs at this time    Activity Tolerance   Pt completed therapy session with Dizziness noted with supine>sit, resolved quickly  Pain noted with R shoulder elevation    Positioning Needs   Pt up in chair, alarm set, positioned in proper neutral alignment and pressure relief provided. Call light provided and all needs within reach    Patient/Family Education   Pt educated on role of inpatient PT, POC, importance of continued activity, DC recommendations, safety awareness, transfer techniques and calling for assist with mobility. Assessment  Pt seen for Physical Therapy evaluation in acute care setting. Pt demonstrated decreased Activity tolerance, Balance, ROM, Safety and Strength as well as decreased independence with Ambulation, Bed Mobility  and Transfers.      Recommending SNF upon discharge as patient functioning well below baseline, demonstrates good rehab potential and unable to return home due to limited or no family support, inability to negotiate stairs to enter home/bedroom/bathroom, home environment not conducive to patient recovery and limited safety awareness. Goals : To be met in 3 visits:  1). Independent with LE Ex x 10 reps    To be met in 6 visits:  1). Supine to/from sit: Mod A   2). Sit to/from stand: Mod A   3). Bed to chair: Mod A   4). Gait: Ambulate 50 ft.  with  Mod A  and use of rolling walker (RW)  5). Tolerate B LE exercises 3 sets of 10-15 reps  6). Ascend/descend 1 steps with Max A  with use of hand rail bilateral and rolling walker (RW)    Rehabilitation Potential: Fair  Strengths for achieving goals include:   Pt motivated and Pt cooperative   Barriers to achieving goals include:    Complexity of condition    Plan    To be seen 3-5 x / week  while in acute care setting for therapeutic exercises, bed mobility, transfers, progressive gait training, balance training, and family/patient education. Signature: Surjit Lorenzo PT, DPT, OMT-C #[id number]      If patient discharges from this facility prior to next visit, this note will serve as the Discharge Summary.

## 2020-06-29 NOTE — PROGRESS NOTES
below baseline and will likely benefit from skilled occupational therapy services to maximize safety and independence. Goal(s) : To be met in 3 Visits:  1). Bed to toilet/BSC: Min A , 2 persons and RW    To be met in 5 Visits:  1). Supine to/from Sit:  Min A   2). Upper Body Bathing:   Min A   3). Lower Body Bathing: Mod A   4). Upper Body Dressing:  Min A to mod   5). Lower Body Dressing: Mod A   6). Pt to demonstrate UE exs x 15 reps with minimal cues    Rehabilitation Potential:  Fair for goals listed above. Strengths for achieving goals include: Pt cooperative  Barriers to achieving goals include:  Complexity of condition     Plan: To be seen 5 x/wk while in acute care setting for therapeutic exercises, bed mobility, transfers, dressing, bathing, family/patient education, ADL/IADL retraining, energy conservation training.      Alisia Severino OTR/L [id number]          If patient discharges from this facility prior to next visit, this note will serve as the Discharge Summary     ;

## 2020-06-29 NOTE — PROGRESS NOTES
C/o right shoulder pain rate as 10/10. Requesting pain medication. Ultram 50 mg Po  Given. See mar. Call light in reach. Bed alarm in place and turned on.

## 2020-06-30 ENCOUNTER — APPOINTMENT (OUTPATIENT)
Dept: CT IMAGING | Age: 85
DRG: 378 | End: 2020-06-30
Payer: MEDICARE

## 2020-06-30 ENCOUNTER — ANESTHESIA (OUTPATIENT)
Dept: ENDOSCOPY | Age: 85
DRG: 378 | End: 2020-06-30
Payer: MEDICARE

## 2020-06-30 ENCOUNTER — ANESTHESIA EVENT (OUTPATIENT)
Dept: ENDOSCOPY | Age: 85
DRG: 378 | End: 2020-06-30
Payer: MEDICARE

## 2020-06-30 VITALS
SYSTOLIC BLOOD PRESSURE: 101 MMHG | RESPIRATION RATE: 21 BRPM | OXYGEN SATURATION: 88 % | DIASTOLIC BLOOD PRESSURE: 40 MMHG

## 2020-06-30 LAB
BLOOD BANK DISPENSE STATUS: NORMAL
BLOOD BANK PRODUCT CODE: NORMAL
BPU ID: NORMAL
DESCRIPTION BLOOD BANK: NORMAL
GLUCOSE BLD-MCNC: 103 MG/DL (ref 70–99)
HCT VFR BLD CALC: 20.4 % (ref 36–48)
HCT VFR BLD CALC: 21.8 % (ref 36–48)
HCT VFR BLD CALC: 23.8 % (ref 36–48)
HEMOGLOBIN: 6.6 G/DL (ref 12–16)
HEMOGLOBIN: 7.1 G/DL (ref 12–16)
HEMOGLOBIN: 7.8 G/DL (ref 12–16)
PERFORMED ON: ABNORMAL
SARS-COV-2, NAAT: NOT DETECTED

## 2020-06-30 PROCEDURE — C1726 CATH, BAL DIL, NON-VASCULAR: HCPCS | Performed by: INTERNAL MEDICINE

## 2020-06-30 PROCEDURE — 3700000001 HC ADD 15 MINUTES (ANESTHESIA): Performed by: INTERNAL MEDICINE

## 2020-06-30 PROCEDURE — 7100000011 HC PHASE II RECOVERY - ADDTL 15 MIN: Performed by: INTERNAL MEDICINE

## 2020-06-30 PROCEDURE — 86923 COMPATIBILITY TEST ELECTRIC: CPT

## 2020-06-30 PROCEDURE — 2580000003 HC RX 258: Performed by: INTERNAL MEDICINE

## 2020-06-30 PROCEDURE — 36415 COLL VENOUS BLD VENIPUNCTURE: CPT

## 2020-06-30 PROCEDURE — 0DB68ZX EXCISION OF STOMACH, VIA NATURAL OR ARTIFICIAL OPENING ENDOSCOPIC, DIAGNOSTIC: ICD-10-PCS | Performed by: INTERNAL MEDICINE

## 2020-06-30 PROCEDURE — 7100000010 HC PHASE II RECOVERY - FIRST 15 MIN: Performed by: INTERNAL MEDICINE

## 2020-06-30 PROCEDURE — 86850 RBC ANTIBODY SCREEN: CPT

## 2020-06-30 PROCEDURE — 86901 BLOOD TYPING SEROLOGIC RH(D): CPT

## 2020-06-30 PROCEDURE — 86900 BLOOD TYPING SEROLOGIC ABO: CPT

## 2020-06-30 PROCEDURE — 99233 SBSQ HOSP IP/OBS HIGH 50: CPT | Performed by: INTERNAL MEDICINE

## 2020-06-30 PROCEDURE — 85018 HEMOGLOBIN: CPT

## 2020-06-30 PROCEDURE — 3609012400 HC EGD TRANSORAL BIOPSY SINGLE/MULTIPLE: Performed by: INTERNAL MEDICINE

## 2020-06-30 PROCEDURE — 36430 TRANSFUSION BLD/BLD COMPNT: CPT

## 2020-06-30 PROCEDURE — 6370000000 HC RX 637 (ALT 250 FOR IP): Performed by: INTERNAL MEDICINE

## 2020-06-30 PROCEDURE — 6360000002 HC RX W HCPCS: Performed by: NURSE ANESTHETIST, CERTIFIED REGISTERED

## 2020-06-30 PROCEDURE — 94150 VITAL CAPACITY TEST: CPT

## 2020-06-30 PROCEDURE — 3609017700 HC EGD DILATION GASTRIC/DUODENAL STRICTURE: Performed by: INTERNAL MEDICINE

## 2020-06-30 PROCEDURE — 3609008400 HC SIGMOIDOSCOPY DIAGNOSTIC: Performed by: INTERNAL MEDICINE

## 2020-06-30 PROCEDURE — 2580000003 HC RX 258: Performed by: HOSPITALIST

## 2020-06-30 PROCEDURE — 85014 HEMATOCRIT: CPT

## 2020-06-30 PROCEDURE — P9016 RBC LEUKOCYTES REDUCED: HCPCS

## 2020-06-30 PROCEDURE — 0D768ZZ DILATION OF STOMACH, VIA NATURAL OR ARTIFICIAL OPENING ENDOSCOPIC: ICD-10-PCS | Performed by: INTERNAL MEDICINE

## 2020-06-30 PROCEDURE — C9113 INJ PANTOPRAZOLE SODIUM, VIA: HCPCS | Performed by: INTERNAL MEDICINE

## 2020-06-30 PROCEDURE — 6360000002 HC RX W HCPCS: Performed by: INTERNAL MEDICINE

## 2020-06-30 PROCEDURE — 88305 TISSUE EXAM BY PATHOLOGIST: CPT

## 2020-06-30 PROCEDURE — 3700000000 HC ANESTHESIA ATTENDED CARE: Performed by: INTERNAL MEDICINE

## 2020-06-30 PROCEDURE — 2709999900 HC NON-CHARGEABLE SUPPLY: Performed by: INTERNAL MEDICINE

## 2020-06-30 PROCEDURE — U0002 COVID-19 LAB TEST NON-CDC: HCPCS

## 2020-06-30 PROCEDURE — 0DJD8ZZ INSPECTION OF LOWER INTESTINAL TRACT, VIA NATURAL OR ARTIFICIAL OPENING ENDOSCOPIC: ICD-10-PCS | Performed by: INTERNAL MEDICINE

## 2020-06-30 PROCEDURE — 71250 CT THORAX DX C-: CPT

## 2020-06-30 PROCEDURE — 2500000003 HC RX 250 WO HCPCS: Performed by: NURSE ANESTHETIST, CERTIFIED REGISTERED

## 2020-06-30 PROCEDURE — 88342 IMHCHEM/IMCYTCHM 1ST ANTB: CPT

## 2020-06-30 PROCEDURE — 1200000000 HC SEMI PRIVATE

## 2020-06-30 RX ORDER — HYDRALAZINE HYDROCHLORIDE 20 MG/ML
5 INJECTION INTRAMUSCULAR; INTRAVENOUS EVERY 10 MIN PRN
Status: DISCONTINUED | OUTPATIENT
Start: 2020-06-30 | End: 2020-07-02

## 2020-06-30 RX ORDER — OXYCODONE HYDROCHLORIDE AND ACETAMINOPHEN 5; 325 MG/1; MG/1
2 TABLET ORAL PRN
Status: ACTIVE | OUTPATIENT
Start: 2020-06-30 | End: 2020-06-30

## 2020-06-30 RX ORDER — DIPHENHYDRAMINE HYDROCHLORIDE 50 MG/ML
12.5 INJECTION INTRAMUSCULAR; INTRAVENOUS
Status: ACTIVE | OUTPATIENT
Start: 2020-06-30 | End: 2020-06-30

## 2020-06-30 RX ORDER — 0.9 % SODIUM CHLORIDE 0.9 %
20 INTRAVENOUS SOLUTION INTRAVENOUS ONCE
Status: COMPLETED | OUTPATIENT
Start: 2020-06-30 | End: 2020-06-30

## 2020-06-30 RX ORDER — ONDANSETRON 2 MG/ML
4 INJECTION INTRAMUSCULAR; INTRAVENOUS
Status: ACTIVE | OUTPATIENT
Start: 2020-06-30 | End: 2020-06-30

## 2020-06-30 RX ORDER — MEPERIDINE HYDROCHLORIDE 25 MG/ML
12.5 INJECTION INTRAMUSCULAR; INTRAVENOUS; SUBCUTANEOUS EVERY 5 MIN PRN
Status: DISCONTINUED | OUTPATIENT
Start: 2020-06-30 | End: 2020-07-02

## 2020-06-30 RX ORDER — LIDOCAINE HYDROCHLORIDE 20 MG/ML
INJECTION, SOLUTION EPIDURAL; INFILTRATION; INTRACAUDAL; PERINEURAL PRN
Status: DISCONTINUED | OUTPATIENT
Start: 2020-06-30 | End: 2020-06-30 | Stop reason: SDUPTHER

## 2020-06-30 RX ORDER — OXYCODONE HYDROCHLORIDE AND ACETAMINOPHEN 5; 325 MG/1; MG/1
1 TABLET ORAL PRN
Status: ACTIVE | OUTPATIENT
Start: 2020-06-30 | End: 2020-06-30

## 2020-06-30 RX ORDER — LABETALOL HYDROCHLORIDE 5 MG/ML
5 INJECTION, SOLUTION INTRAVENOUS EVERY 10 MIN PRN
Status: DISCONTINUED | OUTPATIENT
Start: 2020-06-30 | End: 2020-07-02

## 2020-06-30 RX ORDER — MORPHINE SULFATE 10 MG/ML
2 INJECTION, SOLUTION INTRAMUSCULAR; INTRAVENOUS EVERY 5 MIN PRN
Status: DISCONTINUED | OUTPATIENT
Start: 2020-06-30 | End: 2020-07-02

## 2020-06-30 RX ORDER — PROMETHAZINE HYDROCHLORIDE 25 MG/ML
6.25 INJECTION, SOLUTION INTRAMUSCULAR; INTRAVENOUS
Status: ACTIVE | OUTPATIENT
Start: 2020-06-30 | End: 2020-06-30

## 2020-06-30 RX ORDER — PROPOFOL 10 MG/ML
INJECTION, EMULSION INTRAVENOUS PRN
Status: DISCONTINUED | OUTPATIENT
Start: 2020-06-30 | End: 2020-06-30 | Stop reason: SDUPTHER

## 2020-06-30 RX ORDER — MORPHINE SULFATE 10 MG/ML
1 INJECTION, SOLUTION INTRAMUSCULAR; INTRAVENOUS EVERY 5 MIN PRN
Status: DISCONTINUED | OUTPATIENT
Start: 2020-06-30 | End: 2020-07-02

## 2020-06-30 RX ADMIN — CEFTRIAXONE SODIUM 1 G: 1 INJECTION, POWDER, FOR SOLUTION INTRAMUSCULAR; INTRAVENOUS at 18:36

## 2020-06-30 RX ADMIN — PANTOPRAZOLE SODIUM 40 MG: 40 INJECTION, POWDER, FOR SOLUTION INTRAVENOUS at 12:07

## 2020-06-30 RX ADMIN — LIDOCAINE HYDROCHLORIDE 40 MG: 20 INJECTION, SOLUTION EPIDURAL; INFILTRATION; INTRACAUDAL; PERINEURAL at 09:48

## 2020-06-30 RX ADMIN — POLYETHYLENE GLYCOL-3350 AND ELECTROLYTES 2000 ML: 236; 6.74; 5.86; 2.97; 22.74 POWDER, FOR SOLUTION ORAL at 17:24

## 2020-06-30 RX ADMIN — PANTOPRAZOLE SODIUM 40 MG: 40 INJECTION, POWDER, FOR SOLUTION INTRAVENOUS at 20:47

## 2020-06-30 RX ADMIN — SODIUM CHLORIDE: 9 INJECTION, SOLUTION INTRAVENOUS at 12:08

## 2020-06-30 RX ADMIN — SODIUM CHLORIDE 20 ML: 9 INJECTION, SOLUTION INTRAVENOUS at 02:52

## 2020-06-30 RX ADMIN — Medication 10 ML: at 20:47

## 2020-06-30 RX ADMIN — PROPOFOL 100 MG: 10 INJECTION, EMULSION INTRAVENOUS at 09:48

## 2020-06-30 RX ADMIN — Medication 10 ML: at 12:08

## 2020-06-30 RX ADMIN — BISACODYL 10 MG: 5 TABLET, COATED ORAL at 13:07

## 2020-06-30 RX ADMIN — DOCUSATE SODIUM 100 MG: 100 CAPSULE, LIQUID FILLED ORAL at 20:47

## 2020-06-30 RX ADMIN — HYDROCORTISONE 2.5%: 25 CREAM TOPICAL at 20:47

## 2020-06-30 ASSESSMENT — PAIN SCALES - GENERAL
PAINLEVEL_OUTOF10: 10
PAINLEVEL_OUTOF10: 10
PAINLEVEL_OUTOF10: 0
PAINLEVEL_OUTOF10: 0
PAINLEVEL_OUTOF10: 10
PAINLEVEL_OUTOF10: 0

## 2020-06-30 ASSESSMENT — PAIN DESCRIPTION - ORIENTATION
ORIENTATION: RIGHT

## 2020-06-30 ASSESSMENT — PAIN DESCRIPTION - LOCATION
LOCATION: ARM

## 2020-06-30 ASSESSMENT — PAIN DESCRIPTION - PAIN TYPE
TYPE: CHRONIC PAIN

## 2020-06-30 ASSESSMENT — PAIN - FUNCTIONAL ASSESSMENT: PAIN_FUNCTIONAL_ASSESSMENT: 0-10

## 2020-06-30 NOTE — H&P
Gastroenterology Preop Assessment    Patient:   Carol Riley   :    1935   Facility:   [facility]  Referring/PCP: Brian Aguero MD  Date:     2020    Subjective:   Procedure: EGD/Flex sigmoidoscopy    HPI/Reason for procedure:  Rectal bleeding    Past Medical History:   Diagnosis Date    Anxiety 2012    Arthritis     Asthma     Carpal tunnel syndrome of left wrist 10/6/2011    Depression 2012    GERD (gastroesophageal reflux disease) 2013    Hematuria     chronic from stone    History of nonadherence to medical treatment 10/11/2014    Hypertension     Kidney stone     saw Dr Jerome - 6 cm stone    Thyroid disease     goiter    Tight ring on finger     unable to remove - left hand    Wears dentures     full upper    Wears glasses     for reading     Past Surgical History:   Procedure Laterality Date    CARPAL TUNNEL RELEASE Right 2011    OPEN REDUCTION INTERNAL FIXATION RIGHT WRIST (RUSSO'S FRACTURE)    HYSTERECTOMY      TOTAL KNEE ARTHROPLASTY Right 3/10/2020    RIGHT TOTAL KNEE REPLACEMENT       KANG & NEPHEW performed by Jackson Parmar MD at [facility] 50:   Social History     Tobacco Use    Smoking status: Never Smoker    Smokeless tobacco: Never Used   Substance Use Topics    Alcohol use: No     Family:   Family History   Problem Relation Age of Onset    Other Mother         scleroderma    Heart Disease Father         MI    Heart Attack Son 43    Diabetes Son        Scheduled Medications:    hydrocortisone   Rectal BID    sodium chloride flush  10 mL Intravenous 2 times per day    pantoprazole  40 mg Intravenous BID    cefTRIAXone (ROCEPHIN) IV  1 g Intravenous Q24H    docusate sodium  100 mg Oral BID       Current Medications:    Prior to Admission medications    Medication Sig Start Date End Date Taking?  Authorizing Provider   Cholecalciferol (CVS D3) 50 MCG (2000) CAPS TAKE ONE CAPSULE BY MOUTH DAILY 20  Yes Shayy Harris MD   meloxicam (MOBIC) 15 MG tablet Take 1 tablet by mouth daily 6/4/20 9/2/20 Yes Shayy Harris MD   amLODIPine-benazepril (LOTREL) 5-20 MG per capsule TAKE Enzo Richardson 59  Patient taking differently: daily Patient only takes if her systolic blood pressure is above 110. 5/21/20  Yes Diane Harris MD   albuterol sulfate  (90 Base) MCG/ACT inhaler Inhale 2 puffs into the lungs every 6 hours as needed for Wheezing 3/4/20  Yes Diane Harris MD   SENNA CO Take 1 tablet by mouth as needed    Yes Historical Provider, MD   magnesium hydroxide (MILK OF MAGNESIA) 400 MG/5ML suspension Take 30 mLs by mouth daily as needed for Constipation 8/29/18  Yes Aram Harris MD   aspirin 325 MG EC tablet Take 325 mg by mouth every 4 hours as needed    Yes Historical Provider, MD   [facility] chair for patient use. 5/21/20   Shayy Harris MD   [facility]. Devices [facility] CUSHION) MISC 4 inch cushion for use on wheelchair. 5/21/20   Shayy Harris MD   [facility]. Devices (COMMODE BEDSIDE) [facility] Bedside commode for patient use. 5/21/20   Shayy Harris MD   Misc.  Devices (ROLLER [place]) MISC 1 each by Does not apply route daily 7/19/18   Shayy Harris MD         Current Facility-Administered Medications:     0.9 % sodium chloride infusion, , Intravenous, Continuous, Deanna Collins MD, Last Rate: 75 mL/hr at 06/29/20 2149    hydrocortisone (ANUSOL-HC) 2.5 % rectal cream, , Rectal, BID, Vane Ko, DO    sodium chloride flush 0.9 % injection 10 mL, 10 mL, Intravenous, 2 times per day, Jayne Robledo MD, 10 mL at 06/29/20 2151    sodium chloride flush 0.9 % injection 10 mL, 10 mL, Intravenous, PRN, Jayne Robledo MD, 10 mL at 06/27/20 1754    acetaminophen (TYLENOL) tablet 650 mg, 650 mg, Oral, Q6H PRN **OR** acetaminophen (TYLENOL) suppository 650 mg, 650 mg, Rectal, Q6H PRN, Jayne Robledo MD    polyethylene glycol (GLYCOLAX) packet 17 g, 17 g, Oral, Daily PRN, Rubén Guevara MD    promethazine (PHENERGAN) tablet 12.5 mg, 12.5 mg, Oral, Q6H PRN **OR** ondansetron (ZOFRAN) injection 4 mg, 4 mg, Intravenous, Q6H PRN, Rubén Guevara MD    ipratropium-albuterol (DUONEB) nebulizer solution 1 ampule, 1 ampule, Inhalation, Q4H PRN, Oneal Chacon MD    traMADol (ULTRAM) tablet 50 mg, 50 mg, Oral, Q6H PRN, Rubén Guevara MD, 50 mg at 06/29/20 2310    pantoprazole (PROTONIX) injection 40 mg, 40 mg, Intravenous, BID, Rubén Guevara MD, 40 mg at 06/29/20 2151    cefTRIAXone (ROCEPHIN) 1 g IVPB in 50 mL D5W minibag, 1 g, Intravenous, Q24H, Rubén Guevara MD, Stopped at 06/29/20 1907    docusate sodium (COLACE) capsule 100 mg, 100 mg, Oral, BID, Loan Ko, , 100 mg at 06/28/20 3516      Infusions:    sodium chloride 75 mL/hr at 06/29/20 2149     PRN Medications: sodium chloride flush, acetaminophen **OR** acetaminophen, polyethylene glycol, promethazine **OR** ondansetron, ipratropium-albuterol, traMADol  Allergies: Allergies   Allergen Reactions    Hydrochlorothiazide      Constipation and severe leg cramps         Objective:     Physical Exam:   /61   Pulse 70   Temp 97.5 °F (36.4 °C) (Temporal)   Resp 16   Ht 5' 7\" (1.702 m)   Wt 126 lb 12.8 oz (57.5 kg)   SpO2 97%   BMI 19.86 kg/m²     HEENT: NCAT  Lungs: CTAB  CV: RRR  Abd: soft, ntd  Ext: dpi    Lab and Imaging Review   Labs:  CBC:   Recent Labs     06/27/20  1410  06/29/20  1907 06/30/20  0124 06/30/20  0742   WBC 6.1  --   --   --   --    HGB 11.6*   < > 7.0* 6.6* 7.8*   HCT 36.4   < > 22.0* 20.4* 23.8*   MCV 88.9  --   --   --   --      --   --   --   --     < > = values in this interval not displayed.      BMP:   Recent Labs     06/27/20  1410   *   K 4.5      CO2 27   BUN 30*   CREATININE 0.9     LIVER PROFILE:   Recent Labs     06/27/20  1410   AST 13*   ALT 9*   PROT 6.2*   BILITOT 0.3   ALKPHOS 95     PT/INR:   Recent Labs

## 2020-06-30 NOTE — ANESTHESIA PRE PROCEDURE
Department of Anesthesiology  Preprocedure Note       Name:  Rebecca Iqbal   Age:  80 y.o.  :  1935                                          MRN:  [id number]         Date:  2020      Surgeon: Jane Vaca):  Tl Amaya,     Procedure: Procedure(s):  EGD W/ANES. FLEX SIG W/ANES. Medications prior to admission:   Prior to Admission medications    Medication Sig Start Date End Date Taking? Authorizing Provider   Cholecalciferol (CVS D3) 50 MCG (2000) CAPS TAKE ONE CAPSULE BY MOUTH DAILY 20  Yes Diane Harris MD   meloxicam (MOBIC) 15 MG tablet Take 1 tablet by mouth daily 20 Yes Sonam Harris MD   amLODIPine-benazepril (LOTREL) 5-20 MG per capsule TAKE 1 CAPSULE BY MOUTH EVERY DAY  Patient taking differently: daily Patient only takes if her systolic blood pressure is above 110. 20  Yes Diane Harris MD   albuterol sulfate  (90 Base) MCG/ACT inhaler Inhale 2 puffs into the lungs every 6 hours as needed for Wheezing 3/4/20  Yes Diane Harris MD   SENNA CO Take 1 tablet by mouth as needed    Yes Historical Provider, MD   magnesium hydroxide (MILK OF MAGNESIA) 400 MG/5ML suspension Take 30 mLs by mouth daily as needed for Constipation 18  Yes Sonam Harris MD   aspirin 325 MG EC tablet Take 325 mg by mouth every 4 hours as needed    Yes Historical Provider, MD   [address] [place] [address] chair for patient use. 20   Fransisco Harris MD   [facility] Devices [facility] CUSHION) MISC 4 inch cushion for use on wheelchair. 20   Fransisco Harris MD   [facility]. Devices (COMMODE BEDSIDE) [facility] [place]side commode for patient use. 20   Fransisco Harris MD   [facility].  Devices ([place] [facility] 1 each by Does not apply route daily 18   Fransisco Harris MD       Current medications:    Current Facility-Administered Medications   Medication Dose Route Frequency Provider Last Rate Last Dose    0.9 % sodium chloride infusion   Intravenous Continuous Chanelle Al MD 75 mL/hr at 06/29/20 2149      hydrocortisone (ANUSOL-HC) 2.5 % rectal cream   Rectal BID Omar Ko DO        sodium chloride flush 0.9 % injection 10 mL  10 mL Intravenous 2 times per day Diaz Kent MD   10 mL at 06/29/20 2151    sodium chloride flush 0.9 % injection 10 mL  10 mL Intravenous PRN Diaz Kent MD   10 mL at 06/27/20 1754    acetaminophen (TYLENOL) tablet 650 mg  650 mg Oral Q6H PRN Diaz Kent MD        Or    acetaminophen (TYLENOL) suppository 650 mg  650 mg Rectal Q6H PRN Diaz Kent MD        polyethylene glycol (GLYCOLAX) packet 17 g  17 g Oral Daily PRN Diaz Kent MD        promethazine (PHENERGAN) tablet 12.5 mg  12.5 mg Oral Q6H PRN Diaz Kent MD        Or    ondansetron (ZOFRAN) injection 4 mg  4 mg Intravenous Q6H PRN Diaz Kent MD        ipratropium-albuterol (DUONEB) nebulizer solution 1 ampule  1 ampule Inhalation Q4H PRN Karla Kincaid MD        traMADol (ULTRAM) tablet 50 mg  50 mg Oral Q6H PRN Diaz Kent MD   50 mg at 06/29/20 2310    pantoprazole (PROTONIX) injection 40 mg  40 mg Intravenous BID Diaz Kent MD   40 mg at 06/29/20 2151    cefTRIAXone (ROCEPHIN) 1 g IVPB in 50 mL D5W minibag  1 g Intravenous Q24H Diaz Kent MD   Stopped at 06/29/20 1907    docusate sodium (COLACE) capsule 100 mg  100 mg Oral BID Omar Ko DO   100 mg at 06/28/20 7799       Allergies:     Allergies   Allergen Reactions    Hydrochlorothiazide      Constipation and severe leg cramps       Problem List:    Patient Active Problem List   Diagnosis Code    Carpal tunnel syndrome of left wrist G56.02    Mild intermittent asthma J45.20    Essential hypertension I10    Goiter E04.9    Vitamin D deficiency E55.9    Staghorn calculus N20.0    Biceps rupture, proximal S46.119A    Incomplete tear of right rotator cuff M75.111    History of nonadherence to medical treatment Z91.19    B12 deficiency E53.8    Primary osteoarthritis of both knees M17.0    Irritable bowel syndrome with constipation K58.1    Environmental and seasonal allergies J30.89    Weakness generalized R53.1    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    S/P total knee arthroplasty, right Z96.651    Moderate malnutrition (HCC) E44.0    Blood per rectum K62.5    BRBPR (bright red blood per rectum) K62.5    Acute blood loss anemia D62    Parkinson disease, symptomatic ([address] G20    NSAID long-term use Z79.1    Acute pain of right shoulder M25.511       Past Medical History:        Diagnosis Date    Anxiety 7/20/2012    Arthritis     Asthma     Carpal tunnel syndrome of left wrist 10/6/2011    Depression 5/2/2012    GERD (gastroesophageal reflux disease) 9/4/2013    Hematuria     chronic from stone    History of nonadherence to medical treatment 10/11/2014    Hypertension     Kidney stone     saw Dr Jerome - 6 cm stone    Thyroid disease     goiter    Tight ring on finger     unable to remove - left hand    Wears dentures     full upper    Wears glasses     for reading       Past Surgical History:        Procedure Laterality Date    CARPAL TUNNEL RELEASE Right 09/14/2011    OPEN REDUCTION INTERNAL FIXATION RIGHT WRIST (RUSSO'S FRACTURE)    HYSTERECTOMY      TOTAL KNEE ARTHROPLASTY Right 3/10/2020    RIGHT TOTAL KNEE REPLACEMENT       KANG & NEPHEW performed by Verna Joiner MD at [place] [address] History:    Social History     Tobacco Use    Smoking status: Never Smoker    Smokeless tobacco: Never Used   Substance Use Topics    Alcohol use:  No                                Counseling given: Not Answered      Vital Signs (Current):   Vitals:    06/30/20 0643 06/30/20 0752 06/30/20 0756 06/30/20 0912   BP: 123/69 111/60  129/61   Pulse: 80 65 65 70   Resp: 16 14  16   Temp: 97.6 °F (36.4 °C) 98 °F (36.7 °C)  97.5 °F (36.4 °C)   TempSrc: Oral Oral  Temporal   SpO2: 94% 96% 97%   Weight:       Height:                                                  BP Readings from Last 3 Encounters:   06/30/20 129/61   06/04/20 124/68   03/14/20 134/75       NPO Status: Time of last liquid consumption: 1900                        Time of last solid consumption: 1900                        Date of last liquid consumption: 06/29/20                        Date of last solid food consumption: 06/29/20    BMI:   Wt Readings from Last 3 Encounters:   06/30/20 126 lb 12.8 oz (57.5 kg)   06/04/20 126 lb (57.2 kg)   05/21/20 125 lb (56.7 kg)     Body mass index is 19.86 kg/m².     CBC:   Lab Results   Component Value Date    WBC 6.1 06/27/2020    RBC 4.09 06/27/2020    HGB 7.8 06/30/2020    HCT 23.8 06/30/2020    MCV 88.9 06/27/2020    RDW 13.9 06/27/2020     06/27/2020       CMP:   Lab Results   Component Value Date     06/27/2020    K 4.5 06/27/2020    K 4.6 03/12/2020     06/27/2020    CO2 27 06/27/2020    BUN 30 06/27/2020    CREATININE 0.9 06/27/2020    GFRAA >60 06/27/2020    GFRAA >60 01/16/2013    AGRATIO 1.6 06/27/2020    LABGLOM 59 06/27/2020    GLUCOSE 135 06/27/2020    PROT 6.2 06/27/2020    PROT 6.2 01/16/2013    CALCIUM 9.0 06/27/2020    BILITOT 0.3 06/27/2020    ALKPHOS 95 06/27/2020    AST 13 06/27/2020    ALT 9 06/27/2020       POC Tests:   Recent Labs     06/28/20  1613   POCGLU 74       Coags:   Lab Results   Component Value Date    PROTIME 12.1 06/27/2020    INR 1.04 06/27/2020    APTT 32.8 02/25/2020       HCG (If Applicable): No results found for: PREGTESTUR, PREGSERUM, HCG, HCGQUANT     ABGs: No results found for: PHART, PO2ART, VAD8ZXU, NGP7PDI, BEART, T0UIZYFD     Type & Screen (If Applicable):  No results found for: LABABO, LABRH    Drug/Infectious Status (If Applicable):  No results found for: HIV, HEPCAB    COVID-19 Screening (If Applicable): No results found for: COVID19      Anesthesia Evaluation   no history of anesthetic complications:   Airway: Mallampati:

## 2020-06-30 NOTE — FLOWSHEET NOTE
06/30/20 0533   Vital Signs   Temp 98.2 °F (36.8 °C)   Temp Source Oral   Pulse 61   Heart Rate Source Monitor   Resp 16   /62   BP Location Right upper arm   Patient Position Semi fowlers   Level of Consciousness 0   MEWS Score 1   Patient Currently in Pain Denies   Pain Assessment   Pain Assessment 0-10   Pain Level 0   Oxygen Therapy   SpO2 95 %   O2 Device None (Room air)     Post-transfusion VS complete. See VS above. No reaction noted.

## 2020-06-30 NOTE — OP NOTE
for rectal bleeding    Miguelina Ibarra DO    [address]     Phone: [phone]     Fax: [phone]

## 2020-06-30 NOTE — FLOWSHEET NOTE
Patient had incontinence episode. Provided per care and alerted Cat scan of patient repeated incontinence and thad care may be needed once they arrive to their unit. Will continue to monitor.

## 2020-06-30 NOTE — OP NOTE
Flex sigmoidoscopy Note    Patient:   Niels Grant    YOB: 1935    Facility:   [facility] [Inpatient]  Referring/PCP: Elijah Cheng MD  Procedure:   Flex sigmoidoscopy  Date:     6/30/2020  Endoscopist:  Rachel Hope DO    Preoperative Diagnosis:  rectal bleeding. Postoperative Diagnosis:  Rectal bleeding    Anesthesia:MAC    Estimated blood loss: < 5 ml    Complications:  None    Description of Procedure:  Informed consent was obtained from the patient after explanation of the procedure including indications, description of the procedure,  benefits and possible risks and complications of the procedure, and alternatives. Questions were answered. The patient's history was reviewed and a directed physical examination was performed prior to the procedure. Patient was monitored throughout the procedure with pulse oximetry and periodic assessment of vital signs. Patient was sedated as noted above. The Nursing staff and I performed a time out. With the patient initially in the left lateral decubitus position, a digital rectal examination was performed and revealed negative without mass, lesions or tenderness. The Olympus video colonoscope was placed in the patient's rectum and advanced without difficulty  to the sigmoid colon. Photographs were taken. The prep was poor. Examination of the mucosa was performed during both introduction and withdrawal of the colonoscope. Retroflexed view of the rectum was performed. Findings:     1. A large amount of blood and stool were seen in the rectum and sigmoid colon. Visualization was very poor. 2.  A nasogastric tube was placed and auscultation confirmed placement in the stomach.      Recommendations:    Given rectal bleeding will plan colonoscopy prep through NG tube    Rachel Hope DO

## 2020-06-30 NOTE — PLAN OF CARE
Problem: Nausea/Vomiting:  Goal: Absence of nausea/vomiting  Description: Absence of nausea/vomiting  Outcome: Met This Shift     Problem: Falls - Risk of:  Goal: Will remain free from falls  Description: Will remain free from falls  Outcome: Ongoing  Goal: Absence of physical injury  Description: Absence of physical injury  Outcome: Ongoing     Problem: Skin Integrity:  Goal: Will show no infection signs and symptoms  Description: Will show no infection signs and symptoms  Outcome: Ongoing  Goal: Absence of new skin breakdown  Description: Absence of new skin breakdown  Outcome: Ongoing     Problem: Pain:  Goal: Pain level will decrease  Description: Pain level will decrease  Outcome: Ongoing  Goal: Control of acute pain  Description: Control of acute pain  Outcome: Ongoing     Problem: Discharge Planning:  Goal: Discharged to appropriate level of care  Description: Discharged to appropriate level of care  Outcome: Ongoing     Problem:  Bowel Function - Altered:  Goal: Bowel elimination is within specified parameters  Description: Bowel elimination is within specified parameters  Outcome: Ongoing

## 2020-06-30 NOTE — PROGRESS NOTES
Bedside report given to [facility]  pt in stable condition no needs at this time.  Call light within reach

## 2020-06-30 NOTE — FLOWSHEET NOTE
06/30/20 1345   Encounter Summary   Services provided to: Patient not available   Referral/Consult From: [facility] Family members   Continue Visiting   (6/30/Phone Support.  No answer.)

## 2020-06-30 NOTE — PROGRESS NOTES
Dr. Tori Sierra called regarding Colonoscopy for 7/1/2020. Informed to give half prep today then the other 1/2 of prep at 4am. Information will be passed on in report for nightshift.

## 2020-06-30 NOTE — PROGRESS NOTES
Returned to room via stretcher in stable condition. Alert and oriented. V.s.s. Cleaned up of incont stool, dark bloody stool.  Attends applied and cleaned

## 2020-06-30 NOTE — PROGRESS NOTES
Pt tolerating golytely without difficulty. Repositioned in bed with assist x2. No bm just urinated. Call light in reach. Bed alarm in place and turned on.

## 2020-06-30 NOTE — FLOWSHEET NOTE
H/H 6.6/20.4%. Notified hospitalist. 1 unit of PRBCs ordered. Performed 2 RN verification per policy. Monitored pt initial 15 minutes of blood transfusion. No transfusion reaction noted. 15 min VS noted below. Pt denies chills, lower back pain. Pt denies needs at this time. Will continue to monitor.      06/30/20 0316   Vital Signs   Temp 98 °F (36.7 °C)   Temp Source Oral   Pulse 61   Heart Rate Source Monitor   Resp 16   /64   BP Location Right upper arm   Patient Position Semi fowlers   Level of Consciousness 0   MEWS Score 1   Oxygen Therapy   SpO2 95 %   O2 Device None (Room air)

## 2020-06-30 NOTE — FLOWSHEET NOTE
06/29/20 2122   Vital Signs   Temp 97.5 °F (36.4 °C)   Temp Source Oral   Pulse 60   Heart Rate Source Monitor   Resp 16   /68   Patient Position Semi fowlers   Level of Consciousness 0   MEWS Score 1   Oxygen Therapy   SpO2 97 %   O2 Device None (Room air)     Pt A/Ox4. Shift assessment complete. See flowsheet. Pm meds given. Pt refused colace. See MAR. Pt repositioned to Rt side with pillow support. Telemetry in place. Denies needs. Bed in low position. Bed alarm on. Call light within reach. Will continue to monitor.

## 2020-06-30 NOTE — PROGRESS NOTES
0. 9     BNP: No results for input(s): BNP in the last 72 hours. PT/INR:   Recent Labs     06/27/20  1410   PROTIME 12.1   INR 1.04     APTT: No results for input(s): APTT in the last 72 hours. CARDIAC ENZYMES:   Recent Labs     06/27/20  1410 06/27/20  1600 06/27/20  2251   TROPONINI 0.08* 0.07* 0.09*     FASTING LIPID PANEL:  Lab Results   Component Value Date    CHOL 150 01/20/2018    HDL 47 01/20/2018    TRIG 56 01/20/2018     LIVER PROFILE:   Recent Labs     06/27/20  1410   AST 13*   ALT 9*   BILITOT 0.3   ALKPHOS 95        Radiology  XR SHOULDER RIGHT (MIN 2 VIEWS)   Final Result   1. Right-sided rotator cuff arthropathy and degenerative changes as above. 2. No acute fracture. 3. ORIF hardware involving the proximal left humerus. 4. Atherosclerotic calcification of the aorta. 5. Mild opacity at the medial right lung apex. Further evaluation with PA   and lateral chest or CT chest is recommended to exclude lymphadenopathy or   mass in this region. The findings were sent to the Radiology Results [address] at 4:02   pm on 6/29/2020to be communicated to a licensed caregiver. XR CHEST PORTABLE   Final Result   No radiographic evidence of acute pulmonary disease. Assessment:  Active Problems:    Blood per rectum    BRBPR (bright red blood per rectum)    Acute blood loss anemia    Parkinson disease, symptomatic (HCC)    NSAID long-term use    Acute pain of right shoulder  Resolved Problems:    * No resolved hospital problems. *      Plan:    GI Bleed  Acute blood loss anemia   - rectal bleed with clots   - h.h dropped from 11 to 6.6 , given 2 units PRBc since adm  - BP stable. For sigmoidoscopy today   - unsure if it was diverticular vs hemorrhoidal - anusol prn   - GI consulted. Hold Nsaids   NPO  IVF              HTN   - hold PTA BP regimen.      UTI  - cx not done, on rocephin D 3     R shoulder pain- xray with rotator cuff arthropathy   -  PRN tramadol.    Avoid NSAIDs.      Abnormal right lung apex - obtain ct chest today to rule out mass       Possible parkinsons disease  - prince pill rolling and resting tremor with rigidity R>left  - start low dose sinemet and monitor for improvement   - refer to neurology at dc        DVT Prophylaxis: SCD  Diet: Diet NPO Effective Now  Code Status: Full Code     Dispo - inpatient.        Benjamin Tolentino MD 6/30/2020 7:38 AM

## 2020-07-01 ENCOUNTER — TELEPHONE (OUTPATIENT)
Dept: INTERNAL MEDICINE CLINIC | Age: 85
End: 2020-07-01

## 2020-07-01 LAB
ABO/RH: NORMAL
ANTIBODY SCREEN: NORMAL
BLOOD BANK DISPENSE STATUS: NORMAL
BLOOD BANK PRODUCT CODE: NORMAL
BPU ID: NORMAL
DESCRIPTION BLOOD BANK: NORMAL
HCT VFR BLD CALC: 19 % (ref 36–48)
HCT VFR BLD CALC: 24.8 % (ref 36–48)
HCT VFR BLD CALC: 25.6 % (ref 36–48)
HCT VFR BLD CALC: 27.7 % (ref 36–48)
HEMOGLOBIN: 6.2 G/DL (ref 12–16)
HEMOGLOBIN: 8.2 G/DL (ref 12–16)
HEMOGLOBIN: 8.4 G/DL (ref 12–16)
HEMOGLOBIN: 9 G/DL (ref 12–16)

## 2020-07-01 PROCEDURE — 85018 HEMOGLOBIN: CPT

## 2020-07-01 PROCEDURE — 3609027000 HC COLONOSCOPY: Performed by: INTERNAL MEDICINE

## 2020-07-01 PROCEDURE — 6370000000 HC RX 637 (ALT 250 FOR IP): Performed by: INTERNAL MEDICINE

## 2020-07-01 PROCEDURE — 7100000010 HC PHASE II RECOVERY - FIRST 15 MIN: Performed by: INTERNAL MEDICINE

## 2020-07-01 PROCEDURE — 85014 HEMATOCRIT: CPT

## 2020-07-01 PROCEDURE — 2580000003 HC RX 258: Performed by: HOSPITALIST

## 2020-07-01 PROCEDURE — 0W3P8ZZ CONTROL BLEEDING IN GASTROINTESTINAL TRACT, VIA NATURAL OR ARTIFICIAL OPENING ENDOSCOPIC: ICD-10-PCS | Performed by: INTERNAL MEDICINE

## 2020-07-01 PROCEDURE — 99152 MOD SED SAME PHYS/QHP 5/>YRS: CPT | Performed by: INTERNAL MEDICINE

## 2020-07-01 PROCEDURE — 7100000011 HC PHASE II RECOVERY - ADDTL 15 MIN: Performed by: INTERNAL MEDICINE

## 2020-07-01 PROCEDURE — 2580000003 HC RX 258: Performed by: INTERNAL MEDICINE

## 2020-07-01 PROCEDURE — 6360000002 HC RX W HCPCS: Performed by: INTERNAL MEDICINE

## 2020-07-01 PROCEDURE — 99153 MOD SED SAME PHYS/QHP EA: CPT | Performed by: INTERNAL MEDICINE

## 2020-07-01 PROCEDURE — 36430 TRANSFUSION BLD/BLD COMPNT: CPT

## 2020-07-01 PROCEDURE — P9016 RBC LEUKOCYTES REDUCED: HCPCS

## 2020-07-01 PROCEDURE — 99232 SBSQ HOSP IP/OBS MODERATE 35: CPT | Performed by: INTERNAL MEDICINE

## 2020-07-01 PROCEDURE — 36415 COLL VENOUS BLD VENIPUNCTURE: CPT

## 2020-07-01 PROCEDURE — C9113 INJ PANTOPRAZOLE SODIUM, VIA: HCPCS | Performed by: INTERNAL MEDICINE

## 2020-07-01 PROCEDURE — 1200000000 HC SEMI PRIVATE

## 2020-07-01 PROCEDURE — 2709999900 HC NON-CHARGEABLE SUPPLY: Performed by: INTERNAL MEDICINE

## 2020-07-01 RX ORDER — 0.9 % SODIUM CHLORIDE 0.9 %
20 INTRAVENOUS SOLUTION INTRAVENOUS ONCE
Status: COMPLETED | OUTPATIENT
Start: 2020-07-01 | End: 2020-07-01

## 2020-07-01 RX ORDER — MIDAZOLAM HYDROCHLORIDE 5 MG/ML
INJECTION INTRAMUSCULAR; INTRAVENOUS PRN
Status: DISCONTINUED | OUTPATIENT
Start: 2020-07-01 | End: 2020-07-01 | Stop reason: ALTCHOICE

## 2020-07-01 RX ORDER — FENTANYL CITRATE 50 UG/ML
INJECTION, SOLUTION INTRAMUSCULAR; INTRAVENOUS PRN
Status: DISCONTINUED | OUTPATIENT
Start: 2020-07-01 | End: 2020-07-01 | Stop reason: ALTCHOICE

## 2020-07-01 RX ADMIN — SODIUM CHLORIDE: 9 INJECTION, SOLUTION INTRAVENOUS at 21:25

## 2020-07-01 RX ADMIN — Medication 10 ML: at 08:53

## 2020-07-01 RX ADMIN — SODIUM CHLORIDE 20 ML: 9 INJECTION, SOLUTION INTRAVENOUS at 04:00

## 2020-07-01 RX ADMIN — PANTOPRAZOLE SODIUM 40 MG: 40 INJECTION, POWDER, FOR SOLUTION INTRAVENOUS at 08:53

## 2020-07-01 RX ADMIN — SODIUM CHLORIDE: 9 INJECTION, SOLUTION INTRAVENOUS at 00:08

## 2020-07-01 RX ADMIN — CEFTRIAXONE SODIUM 1 G: 1 INJECTION, POWDER, FOR SOLUTION INTRAMUSCULAR; INTRAVENOUS at 21:24

## 2020-07-01 RX ADMIN — CARBIDOPA AND LEVODOPA 1 TABLET: 10; 100 TABLET ORAL at 21:25

## 2020-07-01 ASSESSMENT — PAIN SCALES - GENERAL
PAINLEVEL_OUTOF10: 0

## 2020-07-01 ASSESSMENT — PAIN - FUNCTIONAL ASSESSMENT: PAIN_FUNCTIONAL_ASSESSMENT: 0-10

## 2020-07-01 NOTE — H&P
Gastroenterology Preop Assessment    Patient:   Zita Lal   :    1935   Facility:   [facility]  Referring/PCP: Marek Leger MD  Date:     2020    Subjective:   Procedure: colonoscopy    HPI/Reason for procedure:  Rectal bleeding    Past Medical History:   Diagnosis Date    Anxiety 2012    Arthritis     Asthma     Carpal tunnel syndrome of left wrist 10/6/2011    Depression 2012    GERD (gastroesophageal reflux disease) 2013    Hematuria     chronic from stone    History of nonadherence to medical treatment 10/11/2014    Hypertension     Kidney stone     saw Dr RU CEJA - 6 cm stone    Thyroid disease     goiter    Tight ring on finger     unable to remove - left hand    Wears dentures     full upper    Wears glasses     for reading     Past Surgical History:   Procedure Laterality Date    CARPAL TUNNEL RELEASE Right 2011    OPEN REDUCTION INTERNAL FIXATION RIGHT WRIST (RUSSO'S FRACTURE)    HYSTERECTOMY      SIGMOIDOSCOPY N/A 2020    FLEX SIG W/ANES.  performed by Re Overton DO at [facility] Right 3/10/2020    RIGHT TOTAL KNEE REPLACEMENT       KANG & NEPHEW performed by Wili Alvarado MD at  [facility] N/A 2020    EGD BIOPSY performed by Re Overton DO at [facility] 5  2020    EGD DILATION BALLOON performed by Re Overton DO at [address] ENDOSCOPY       Social:   Social History     Tobacco Use    Smoking status: Never Smoker    Smokeless tobacco: Never Used   Substance Use Topics    Alcohol use: No     Family:   Family History   Problem Relation Age of Onset    Other Mother         scleroderma    Heart Disease Father         MI    Heart Attack Son 43    Diabetes Son        Scheduled Medications:    carbidopa-levodopa  1 tablet Oral BID    hydrocortisone   Rectal BID    sodium chloride flush  10 mL Intravenous 2 times per day    cefTRIAXone (ROCEPHIN) IV  1 g Intravenous Q24H    docusate sodium  100 mg Oral BID       Current Medications:    Prior to Admission medications    Medication Sig Start Date End Date Taking? Authorizing Provider   Cholecalciferol ([employer] D3) 50 MCG (2000 UT) CAPS TAKE ONE CAPSULE BY MOUTH DAILY 6/9/20  Yes Diane Harris MD   meloxicam (MOBIC) 15 MG tablet Take 1 tablet by mouth daily 6/4/20 9/2/20 Yes Prabhu Harris MD   amLODIPine-benazepril (LOTREL) 5-20 MG per capsule TAKE 1 CAPSULE BY MOUTH EVERY DAY  Patient taking differently: daily Patient only takes if her systolic blood pressure is above 110. 5/21/20  Yes Diane Harris MD   albuterol sulfate  (90 Base) MCG/ACT inhaler Inhale 2 puffs into the lungs every 6 hours as needed for Wheezing 3/4/20  Yes iDane Harris MD   SENNA CO Take 1 tablet by mouth as needed    Yes Historical Provider, MD   magnesium hydroxide (MILK OF MAGNESIA) 400 MG/5ML suspension Take 30 mLs by mouth daily as needed for Constipation 8/29/18  Yes Prabhu Harris MD   aspirin 325 MG EC tablet Take 325 mg by mouth every 4 hours as needed    Yes Historical Provider, MD   [address] [place] [address] chair for patient use. 5/21/20   Alis Harris MD   [facility] Devices [facility] CUSHION) MISC 4 inch cushion for use on wheelchair. 5/21/20   Alis Harris MD   Misc. Devices (COMMODE BEDSIDE) [facility] Bedside commode for patient use. 5/21/20   Alis Harris MD   Misc.  Devices (ROLLER [place]) MISC 1 each by Does not apply route daily 7/19/18   Alis Harris MD         Current Facility-Administered Medications:     carbidopa-levodopa (SINEMET)  MG per tablet 1 tablet, 1 tablet, Oral, BID, Sherita Bueno MD    HYDROmorphone (DILAUDID) injection 0.25 mg, 0.25 mg, Intravenous, Q5 Min PRN, Fernanda Velez MD    HYDROmorphone (DILAUDID) injection 0.5 mg, 0.5 mg, Intravenous, Q5 Min PRN, Fernanda Velez MD    morphine injection 1 mg, 1 mg, Intravenous, Q5 Min PRN, Branden Aranda MD    morphine injection 2 mg, 2 mg, Intravenous, Q5 Min PRN, Branden Aranda MD    labetalol (NORMODYNE;TRANDATE) injection 5 mg, 5 mg, Intravenous, Q10 Min PRN, Branden Aranda MD    hydrALAZINE (APRESOLINE) injection 5 mg, 5 mg, Intravenous, Q10 Min PRN, Branden Aranda MD    meperidine (DEMEROL) injection 12.5 mg, 12.5 mg, Intravenous, Q5 Min PRN, Branden Aranda MD    0.9 % sodium chloride infusion, , Intravenous, Continuous, Vnih Blue MD, Last Rate: 75 mL/hr at 07/01/20 0008    hydrocortisone (ANUSOL-HC) 2.5 % rectal cream, , Rectal, BID, Erna Ko,     sodium chloride flush 0.9 % injection 10 mL, 10 mL, Intravenous, 2 times per day, Lucy Ibarra MD, 10 mL at 07/01/20 0853    sodium chloride flush 0.9 % injection 10 mL, 10 mL, Intravenous, PRN, Lucy Ibarra MD, 10 mL at 06/27/20 1754    acetaminophen (TYLENOL) tablet 650 mg, 650 mg, Oral, Q6H PRN **OR** acetaminophen (TYLENOL) suppository 650 mg, 650 mg, Rectal, Q6H PRN, Lucy Ibarra MD    polyethylene glycol (GLYCOLAX) packet 17 g, 17 g, Oral, Daily PRN, Lucy Ibarra MD    promethazine (PHENERGAN) tablet 12.5 mg, 12.5 mg, Oral, Q6H PRN **OR** ondansetron (ZOFRAN) injection 4 mg, 4 mg, Intravenous, Q6H PRN, Lucy Ibarra MD    ipratropium-albuterol (DUONEB) nebulizer solution 1 ampule, 1 ampule, Inhalation, Q4H PRN, Augusto Chan MD    traMADol (ULTRAM) tablet 50 mg, 50 mg, Oral, Q6H PRN, Lucy Ibarra MD, 50 mg at 06/29/20 2310    cefTRIAXone (ROCEPHIN) 1 g IVPB in 50 mL D5W minibag, 1 g, Intravenous, Q24H, Lucy Ibarra MD, Stopped at 06/30/20 1906    docusate sodium (COLACE) capsule 100 mg, 100 mg, Oral, BID, Erna Ko DO, 100 mg at 06/30/20 2047      Infusions:    sodium chloride 75 mL/hr at 07/01/20 0008     PRN Medications: HYDROmorphone, HYDROmorphone, morphine, morphine, labetalol, hydrALAZINE, meperidine, sodium chloride flush, acetaminophen **OR** acetaminophen, polyethylene glycol, promethazine **OR** ondansetron, ipratropium-albuterol, traMADol  Allergies: Allergies   Allergen Reactions    Hydrochlorothiazide      Constipation and severe leg cramps         Objective:     Physical Exam:   BP (!) 144/70   Pulse 70   Temp 97.2 °F (36.2 °C) (Temporal)   Resp 16   Ht 5' 7\" (1.702 m)   Wt 126 lb 14.4 oz (57.6 kg)   SpO2 96%   BMI 19.88 kg/m²     HEENT: NCAT  Lungs: CTAB  CV: RRR  Abd: soft, ntd  Ext: dpi    Lab and Imaging Review   Labs:  CBC:   Recent Labs     07/01/20  0052 07/01/20  0803 07/01/20  1303   HGB 6.2* 8.2* 8.4*   HCT 19.0* 24.8* 25.6*     BMP: No results for input(s): NA, K, CL, CO2, PHOS, BUN, CREATININE in the last 72 hours. Invalid input(s): CA  LIVER PROFILE: No results for input(s): AST, ALT, LIPASE, PROT, BILIDIR, BILITOT, ALKPHOS in the last 72 hours. Invalid input(s): AMYLASE,  ALB  PT/INR: No results for input(s): INR in the last 72 hours. Invalid input(s): PT    Pre-Procedure Assessment / Plan:  ASA: Class 3 - A patient with severe systemic disease that limits activity but is not incapacitating  Airway: Mallampati: II (soft palate, uvula, fauces visible)  Level of Sedation Plan: Moderate sedation  Post Procedure plan: Return to same level of care      Plan:   1. colonoscopy    I assessed the patient and find that the patient is in satisfactory condition to proceed with the planned procedure and sedation plan. I have explained the risk, benefits, and alternatives to the procedure; the patient understands and agrees to proceed.        Boston Aranda  7/1/2020

## 2020-07-01 NOTE — TELEPHONE ENCOUNTER
Christelle Reynoso from [employer] was infomred patient was in the hospital & she called our office to get an update. Informed admitted 6/27 for rectal bleeding & has been treated by GI in the hospital.    Christelle Reynoso asks for call back when discharged from the hospital to restart care.

## 2020-07-01 NOTE — PROGRESS NOTES
Progress Note    Admit Date:  6/27/2020    More rectal bleeding overnight  EGD neg  Sigmoidoscopy with blood   Drop in h.h , given 2  unit pRBC in 24 hrs    Subjective:  Ms. Melinda Menon seen up in bed, had NG placed for bowel prep       Objective:   /74   Pulse 65   Temp 97.1 °F (36.2 °C) (Oral)   Resp 18   Ht 5' 7\" (1.702 m)   Wt 126 lb 14.4 oz (57.6 kg)   SpO2 95%   BMI 19.88 kg/m²       Intake/Output Summary (Last 24 hours) at 7/1/2020 0751  Last data filed at 7/1/2020 0646  Gross per 24 hour   Intake 1237 ml   Output --   Net 1237 ml         Physical Exam:  General: Elderly female , appears very fatigued and frail . awake, alert, oriented. NAD  Skin:  Warm and dry  Neck:  JVD absent. Neck supple  NG in place  Chest:  Clear to auscultation, respiration easy. No wheezes, rales or rhonchi. Cardiovascular:  RRR ,S1S2 normal  Abdomen:  Soft, non tender, non distended, BS +  Extremities: right shoulder limited movements with pain. Neck movements normal   Intact peripheral pulses. Brisk cap refill, < 2 secs  Neuro: non focal  prince pill rolling and resting tremor noted   Cog wheel rigidity and apathic face noted      Medications:   Scheduled Meds:   hydrocortisone   Rectal BID    sodium chloride flush  10 mL Intravenous 2 times per day    pantoprazole  40 mg Intravenous BID    cefTRIAXone (ROCEPHIN) IV  1 g Intravenous Q24H    docusate sodium  100 mg Oral BID       Continuous Infusions:   sodium chloride 75 mL/hr at 07/01/20 0008       Data:  CBC:   Recent Labs     06/30/20  0742 06/30/20  1835 07/01/20  0052   HGB 7.8* 7.1* 6.2*   HCT 23.8* 21.8* 19.0*     BMP:   No results for input(s): NA, K, CL, CO2, PHOS, BUN, CREATININE in the last 72 hours. Invalid input(s): CA  BNP: No results for input(s): BNP in the last 72 hours. PT/INR:   No results for input(s): PROTIME, INR in the last 72 hours. APTT: No results for input(s): APTT in the last 72 hours.   CARDIAC ENZYMES:   No results for input(s): CKMB, CKMBINDEX, TROPONINI in the last 72 hours. Invalid input(s): CKTOTAL;3  FASTING LIPID PANEL:  Lab Results   Component Value Date    CHOL 150 01/20/2018    HDL 47 01/20/2018    TRIG 56 01/20/2018     LIVER PROFILE:   No results for input(s): AST, ALT, ALB, BILIDIR, BILITOT, ALKPHOS in the last 72 hours. Radiology  CT CHEST WO CONTRAST   Final Result   Enlarged right thyroid lobe extending into the mediastinum corresponds to the   abnormality on the radiographs yesterday. No pulmonary mass or   lymphadenopathy. Dependent linear opacities in the lingula and right lower lobe, favoring   atelectasis. An inflammatory process is considered less likely. XR SHOULDER RIGHT (MIN 2 VIEWS)   Final Result   1. Right-sided rotator cuff arthropathy and degenerative changes as above. 2. No acute fracture. 3. ORIF hardware involving the proximal left humerus. 4. Atherosclerotic calcification of the aorta. 5. Mild opacity at the medial right lung apex. Further evaluation with PA   and lateral chest or CT chest is recommended to exclude lymphadenopathy or   mass in this region. The findings were sent to the Radiology Results [address] at 4:02   pm on 6/29/2020to be communicated to a licensed caregiver. XR CHEST PORTABLE   Final Result   No radiographic evidence of acute pulmonary disease. Assessment:  Active Problems:    Blood per rectum    BRBPR (bright red blood per rectum)    Acute blood loss anemia    Parkinson disease, symptomatic (HCC)    NSAID long-term use    Acute pain of right shoulder  Resolved Problems:    * No resolved hospital problems. *      Plan:    GI Bleed  Acute blood loss anemia   - rectal bleed with clots   - h.h dropped from 11 to 6.6 , given 3 units PRBc since adm  - BP stable. EGD neg, sigmoidoscopy with blood, had NG placed for Bowel prep and for colonoscopy today   - unsure if it was diverticular   - GI consulted.  Hold Nsaids   NPO  IVF HTN   - hold PTA BP regimen.      UTI  - cx not done, on rocephin D 3     R shoulder pain- xray with rotator cuff arthropathy   -  PRN tramadol. Avoid NSAIDs.      Abnormal right lung apex - obtain ct chest today to rule out mass       Possible parkinsons disease  - prince pill rolling and resting tremor with rigidity R>left  - start low dose sinemet and monitor for improvement   - refer to neurology at dc        DVT Prophylaxis: SCD  Diet: Diet NPO Effective Now  Code Status: Full Code     Dispo - inpatient.        Connie Pope MD 7/1/2020 7:51 AM

## 2020-07-01 NOTE — PROGRESS NOTES
Bedside report given to Shy LONDON  pt in stable condition no needs at this time.  Call light within reach

## 2020-07-01 NOTE — OP NOTE
Colonoscopy Note    Patient:   Khoa Rand    YOB: 1935    Facility:     [facility] [address] ENDOSCOPY  [address] [address]   [Inpatient]  Referring/PCP: Thurlow Lundborg, MD  Procedure:   Colonoscopy with control of bleeding  Date:     7/1/2020  Endoscopist:  Sophie Fu DO    Preoperative Diagnosis:  Rectal bleeding. Postoperative Diagnosis:  Hemorrhoids, diverticulosis    Anesthesia: Versed 3 mg, Fentanyl 75 mcg    Estimated blood loss: < 5 ml    Complications:  None    Description of Procedure:  Informed consent was obtained from the patient after explanation of the procedure including indications, description of the procedure,  benefits and possible risks and complications of the procedure, and alternatives. Questions were answered. The patient's history was reviewed and a directed physical examination was performed prior to the procedure. Patient was monitored throughout the procedure with pulse oximetry and periodic assessment of vital signs. Patient was sedated as noted above. The Nursing staff and I performed a time out. With the patient initially in the left lateral decubitus position, a digital rectal examination was performed and revealed negative without mass, lesions or tenderness. The Olympus video colonoscope was placed in the patient's rectum and advanced without difficulty  to the cecum, which was identified by the ileocecal valve and appendiceal orifice. Photographs were taken. The prep was good. Examination of the mucosa was performed during both introduction and withdrawal of the colonoscope. Retroflexed view of the rectum was performed. Withdrawal time was 6 minutes. Findings:     1. Multiple diverticuli throughout the colon  2. Normal terminal ileum, no blood seen throughout the colon  3.   Medium internal hemorrhoids     Recommendations:    Rectal bleeding likely from anal fissure or diverticulosis  Recommend daily ppi given pyloric stenosis and esophagitis  Minimize straining, anusol for 1 week  Monitor for further bleeding      Mandy Villalobos DO    [address]     Phone: [phone]     Fax: [phone]

## 2020-07-01 NOTE — PLAN OF CARE
Problem: Falls - Risk of:  Goal: Will remain free from falls  Description: Will remain free from falls  Outcome: Ongoing     Problem: Skin Integrity:  Goal: Will show no infection signs and symptoms  Description: Will show no infection signs and symptoms  Outcome: Ongoing  Goal: Absence of new skin breakdown  Description: Absence of new skin breakdown  Outcome: Ongoing     Problem: Pain:  Goal: Pain level will decrease  Description: Pain level will decrease  Outcome: Ongoing  Goal: Control of acute pain  Description: Control of acute pain  Outcome: Ongoing  Goal: Control of chronic pain  Description: Control of chronic pain  Outcome: Ongoing     Problem: Discharge Planning:  Goal: Discharged to appropriate level of care  Description: Discharged to appropriate level of care  Outcome: Ongoing     Problem:  Bowel Function - Altered:  Goal: Bowel elimination is within specified parameters  Description: Bowel elimination is within specified parameters  Outcome: Ongoing     Problem: Fluid Volume - Imbalance:  Goal: Will show no signs and symptoms of excessive bleeding  Description: Will show no signs and symptoms of excessive bleeding  Outcome: Ongoing

## 2020-07-01 NOTE — FLOWSHEET NOTE
07/01/20 0356   Vital Signs   Temp 97.5 °F (36.4 °C)   Temp Source Oral   Pulse 69   Heart Rate Source Monitor   Resp 16   BP (!) 107/58   BP Location Right upper arm   BP Upper/Lower Upper   Patient Position Semi fowlers   Level of Consciousness 0   MEWS Score 1   Oxygen Therapy   SpO2 96 %   O2 Device None (Room air)   Unit of blood infusing at this time. Pt showing no signs of reaction after being observed for 15 mins, Will continue to monitor.  Call light within reach

## 2020-07-01 NOTE — FLOWSHEET NOTE
06/30/20 2034   Vital Signs   Temp 97.4 °F (36.3 °C)   Temp Source Oral   Pulse 67   Heart Rate Source Monitor   Resp 16   /71   BP Location Right upper arm   BP Upper/Lower Upper   Patient Position Semi fowlers   Level of Consciousness 0   MEWS Score 1   Oxygen Therapy   SpO2 95 %   O2 Device None (Room air)   Pt A/O x 4 assessment completed. Pt completed first half of bowel prep at this time. PM meds given. Pt on/off bed pan.  Pt denies any needs call light within reach

## 2020-07-01 NOTE — PROGRESS NOTES
Occupational Therapy/ Physical Therapy   Attempted OT/PT treatment and the pt was off the floor. Will attempt again tomorrow as time allows.   Yue Rubin OTR/L [id number]

## 2020-07-02 VITALS
SYSTOLIC BLOOD PRESSURE: 111 MMHG | BODY MASS INDEX: 19.92 KG/M2 | DIASTOLIC BLOOD PRESSURE: 61 MMHG | OXYGEN SATURATION: 95 % | RESPIRATION RATE: 16 BRPM | WEIGHT: 126.9 LBS | HEIGHT: 67 IN | TEMPERATURE: 98.6 F | HEART RATE: 58 BPM

## 2020-07-02 LAB
ANION GAP SERPL CALCULATED.3IONS-SCNC: 7 MMOL/L (ref 3–16)
BASOPHILS ABSOLUTE: 0 K/UL (ref 0–0.2)
BASOPHILS RELATIVE PERCENT: 0.6 %
BUN BLDV-MCNC: 9 MG/DL (ref 7–20)
CALCIUM SERPL-MCNC: 7.9 MG/DL (ref 8.3–10.6)
CHLORIDE BLD-SCNC: 107 MMOL/L (ref 99–110)
CO2: 25 MMOL/L (ref 21–32)
CREAT SERPL-MCNC: 0.6 MG/DL (ref 0.6–1.2)
EOSINOPHILS ABSOLUTE: 0.2 K/UL (ref 0–0.6)
EOSINOPHILS RELATIVE PERCENT: 3.3 %
GFR AFRICAN AMERICAN: >60
GFR NON-AFRICAN AMERICAN: >60
GLUCOSE BLD-MCNC: 94 MG/DL (ref 70–99)
HCT VFR BLD CALC: 22.6 % (ref 36–48)
HCT VFR BLD CALC: 23 % (ref 36–48)
HCT VFR BLD CALC: 23.5 % (ref 36–48)
HCT VFR BLD CALC: 25.4 % (ref 36–48)
HEMOGLOBIN: 7.4 G/DL (ref 12–16)
HEMOGLOBIN: 7.6 G/DL (ref 12–16)
HEMOGLOBIN: 7.6 G/DL (ref 12–16)
HEMOGLOBIN: 8.4 G/DL (ref 12–16)
LYMPHOCYTES ABSOLUTE: 0.7 K/UL (ref 1–5.1)
LYMPHOCYTES RELATIVE PERCENT: 14.5 %
MCH RBC QN AUTO: 30 PG (ref 26–34)
MCHC RBC AUTO-ENTMCNC: 33 G/DL (ref 31–36)
MCV RBC AUTO: 90.9 FL (ref 80–100)
MONOCYTES ABSOLUTE: 0.4 K/UL (ref 0–1.3)
MONOCYTES RELATIVE PERCENT: 8.1 %
NEUTROPHILS ABSOLUTE: 3.7 K/UL (ref 1.7–7.7)
NEUTROPHILS RELATIVE PERCENT: 73.5 %
PDW BLD-RTO: 13.9 % (ref 12.4–15.4)
PLATELET # BLD: 119 K/UL (ref 135–450)
PMV BLD AUTO: 9.5 FL (ref 5–10.5)
POTASSIUM SERPL-SCNC: 3.6 MMOL/L (ref 3.5–5.1)
RBC # BLD: 2.53 M/UL (ref 4–5.2)
SODIUM BLD-SCNC: 139 MMOL/L (ref 136–145)
WBC # BLD: 5 K/UL (ref 4–11)

## 2020-07-02 PROCEDURE — 2580000003 HC RX 258: Performed by: INTERNAL MEDICINE

## 2020-07-02 PROCEDURE — 36415 COLL VENOUS BLD VENIPUNCTURE: CPT

## 2020-07-02 PROCEDURE — 85014 HEMATOCRIT: CPT

## 2020-07-02 PROCEDURE — 80048 BASIC METABOLIC PNL TOTAL CA: CPT

## 2020-07-02 PROCEDURE — 6370000000 HC RX 637 (ALT 250 FOR IP): Performed by: INTERNAL MEDICINE

## 2020-07-02 PROCEDURE — 97535 SELF CARE MNGMENT TRAINING: CPT

## 2020-07-02 PROCEDURE — 97116 GAIT TRAINING THERAPY: CPT

## 2020-07-02 PROCEDURE — 97112 NEUROMUSCULAR REEDUCATION: CPT

## 2020-07-02 PROCEDURE — 85018 HEMOGLOBIN: CPT

## 2020-07-02 PROCEDURE — 85025 COMPLETE CBC W/AUTO DIFF WBC: CPT

## 2020-07-02 PROCEDURE — 97530 THERAPEUTIC ACTIVITIES: CPT

## 2020-07-02 PROCEDURE — 99223 1ST HOSP IP/OBS HIGH 75: CPT | Performed by: INTERNAL MEDICINE

## 2020-07-02 RX ORDER — PANTOPRAZOLE SODIUM 40 MG/1
40 TABLET, DELAYED RELEASE ORAL
Qty: 30 TABLET | Refills: 3
Start: 2020-07-03 | End: 2020-10-06

## 2020-07-02 RX ORDER — PSEUDOEPHEDRINE HCL 30 MG
100 TABLET ORAL DAILY
Qty: 10 CAPSULE | Refills: 0
Start: 2020-07-02 | End: 2020-08-19

## 2020-07-02 RX ORDER — AMLODIPINE BESYLATE 5 MG/1
5 TABLET ORAL DAILY
Status: DISCONTINUED | OUTPATIENT
Start: 2020-07-02 | End: 2020-07-02 | Stop reason: HOSPADM

## 2020-07-02 RX ORDER — TRAMADOL HYDROCHLORIDE 50 MG/1
50 TABLET ORAL EVERY 6 HOURS PRN
Qty: 6 TABLET | Refills: 0 | Status: SHIPPED | OUTPATIENT
Start: 2020-07-02 | End: 2020-07-05

## 2020-07-02 RX ORDER — POLYETHYLENE GLYCOL 3350 17 G/17G
17 POWDER, FOR SOLUTION ORAL DAILY PRN
Qty: 527 G | Refills: 0
Start: 2020-07-02 | End: 2020-08-01

## 2020-07-02 RX ORDER — PANTOPRAZOLE SODIUM 40 MG/1
40 TABLET, DELAYED RELEASE ORAL
Status: DISCONTINUED | OUTPATIENT
Start: 2020-07-02 | End: 2020-07-02 | Stop reason: HOSPADM

## 2020-07-02 RX ORDER — HYDROCORTISONE 25 MG/G
CREAM TOPICAL
Qty: 2 TUBE | Refills: 0
Start: 2020-07-02 | End: 2020-08-19

## 2020-07-02 RX ADMIN — HYDROCORTISONE 2.5%: 25 CREAM TOPICAL at 09:07

## 2020-07-02 RX ADMIN — Medication 10 ML: at 09:06

## 2020-07-02 RX ADMIN — AMLODIPINE BESYLATE 5 MG: 5 TABLET ORAL at 12:09

## 2020-07-02 RX ADMIN — PANTOPRAZOLE SODIUM 40 MG: 40 TABLET, DELAYED RELEASE ORAL at 09:10

## 2020-07-02 RX ADMIN — DOCUSATE SODIUM 100 MG: 100 CAPSULE, LIQUID FILLED ORAL at 09:05

## 2020-07-02 RX ADMIN — CARBIDOPA AND LEVODOPA 1 TABLET: 10; 100 TABLET ORAL at 09:05

## 2020-07-02 ASSESSMENT — PAIN SCALES - GENERAL: PAINLEVEL_OUTOF10: 0

## 2020-07-02 NOTE — PROGRESS NOTES
PROGRESS NOTE  S:85 yrs Patient  admitted on 6/27/2020 with Blood per rectum [K62.5] . No further bleeding    Current Hospital Schedued Meds   pantoprazole  40 mg Oral QAM AC    amLODIPine  5 mg Oral Daily    carbidopa-levodopa  1 tablet Oral BID    hydrocortisone   Rectal BID    sodium chloride flush  10 mL Intravenous 2 times per day    cefTRIAXone (ROCEPHIN) IV  1 g Intravenous Q24H    docusate sodium  100 mg Oral BID     Current Hospital IV Meds    Current Hospital PRN Meds  sodium chloride flush, acetaminophen **OR** acetaminophen, polyethylene glycol, promethazine **OR** ondansetron, ipratropium-albuterol, traMADol    Exam:   Vitals:    07/02/20 0902   BP: (!) 148/81   Pulse: 75   Resp: 16   Temp: 99 °F (37.2 °C)   SpO2: 95%     I/O last 3 completed shifts: In: 18 [P.O.:480]  Out: -    General appearance: alert, appears stated age and cooperative  HEENT: PERRLA  Neck: no adenopathy, no carotid bruit, no JVD, supple, symmetrical, trachea midline and thyroid not enlarged, symmetric, no tenderness/mass/nodules  Lungs: clear to auscultation bilaterally  Heart: regular rate and rhythm, S1, S2 normal, no murmur, click, rub or gallop  Abdomen: soft, non-tender; bowel sounds normal; no masses,  no organomegaly  Extremities: extremities normal, atraumatic, no cyanosis or edema     Labs:  CBC:   Recent Labs     07/01/20  1851 07/02/20  0046 07/02/20  0609   WBC  --   --  5.0   HGB 9.0* 7.4* 7.6*   HCT 27.7* 22.6* 23.0*   MCV  --   --  90.9   PLT  --   --  119*     BMP:   Recent Labs     07/02/20  0609      K 3.6      CO2 25   BUN 9   CREATININE 0.6     LIVER PROFILE: No results for input(s): AST, ALT, LIPASE, PROT, BILIDIR, BILITOT, ALKPHOS in the last 72 hours. Invalid input(s): AMYLASE,  ALB  PT/INR: No results for input(s): INR in the last 72 hours.     Invalid input(s): PT    IMAGING:  Ct Chest Wo Contrast    Result Date: 6/30/2020  EXAMINATION: CT OF THE CHEST WITHOUT CONTRAST 6/30/2020 10:54 am TECHNIQUE: CT of the chest was performed without the administration of intravenous contrast. Multiplanar reformatted images are provided for review. Dose modulation, iterative reconstruction, and/or weight based adjustment of the mA/kV was utilized to reduce the radiation dose to as low as reasonably achievable. COMPARISON: Right shoulder series yesterday. HISTORY: ORDERING SYSTEM PROVIDED HISTORY: abn right lung apex TECHNOLOGIST PROVIDED HISTORY: Reason for exam:->abn right lung apex Reason for Exam: abn right lung apex FINDINGS: Mediastinum: Enlarged right thyroid lobe extending into the superior mediastinum, which accounts 4 the abnormality identified on the shoulder series yesterday. The heart and great vessels reveal no acute findings. Calcified atheromatous plaque and coronary calcifications are noted. Mitral annular calcification is noted. No pericardial effusion or lymphadenopathy. An enteric tube is present, terminating in the stomach. Lungs/pleura: Linear opacities in the dependent lingula and right lower lobe are noted. The lungs are otherwise clear. No pulmonary mass identified. No effusion. The central airway is patent. Upper Abdomen: Partially visualized left nephrolithiasis. Soft Tissues/Bones: Multilevel degenerative change in the spine. No acute osseous abnormality appreciated. Enlarged right thyroid lobe extending into the mediastinum corresponds to the abnormality on the radiographs yesterday. No pulmonary mass or lymphadenopathy. Dependent linear opacities in the lingula and right lower lobe, favoring atelectasis. An inflammatory process is considered less likely.         Hospital Problems           Last Modified POA    Blood per rectum 6/27/2020 Yes    BRBPR (bright red blood per rectum) 6/29/2020 Yes    Acute blood loss anemia 6/29/2020 Yes    Parkinson disease, symptomatic ([address] 6/29/2020 Yes    NSAID long-term use 6/29/2020 Yes

## 2020-07-02 NOTE — PROGRESS NOTES
Inpatient Physical Therapy Daily Treatment Note    Unit: 2 [address]  Date:  7/2/2020  Patient Name:    Oumou Johnson  Admitting diagnosis:  Blood per rectum [K62.5]  Admit Date:  6/27/2020  Precautions/Restrictions: Fall risk, Bed/chair alarm, Lines -IV and Purewick catheter, Telemetry and tremor BUE, acute R shoulder pain (MD made aware), toe walking, bradykinesia with delayed processing      Discharge Recommendations: SNF  DME needs for discharge: defer to facility       Therapy recommendation for EMS Transport: can transport by wheelchair    Therapy recommendations for staff:   Assist of 1 with use of rolling walker (RW) for all transfers and ambulation to/from [facility]  to/from chair    History of Present Illness: Per H&P dated 6/27/20:  \"The patient is a 80 y. o. female w hx of chronic hematuria due to nephrolithiasis, depression, who presents w complaint of BRBPR. Pt reports she had a bloody BM this am - clots and azar blod, that she is worried filled the toilet bowl. Per patient, she spent the rest of the morning in the bathroom but not sure if she actually had any more bloody BMs since that one episode. She denies any abd pain. She reports she had to be taking mobic for her R shoulder pain and is worried this could have been contributory. In ED FOBt was found positive. Hgb reassuring at 11.6 abd hemodynamics stable. \"    Home Health S4 Level Recommendation: NA  AM-PAC Mobility Score   AM-PAC Inpatient Mobility Raw Score : 11     Treatment Time: 1019 - 1045  Treatment number: 2  Timed Code Treatment Minutes: 26 minutes  Total Treatment Minutes:  26  minutes    Cognition    A&O x4   Able to follow 2 step commands    Subjective  Patient lying supine in bed with no family present   Pt agreeable to this PT tx. Pain   Yes  Location: bilateral toes  Rating:    mild/10  Pain Medicine Status: Denies need     Bed Mobility   Supine to Sit:    Max A   Sit to Supine:   Max A   Rolling:   Not Tested  Scooting:    Max A  and 2 persons    Transfer Training     Sit to stand: Max A   Stand to sit: Max A   Bed to Chair:   Max A  with use of rolling walker (RW), needed extensive verbal and tactile cueing for weight shifting to clear the LEs. Gait Training gait completed as indicated below  Distance:      8 ft  Deviations (firm surface/linoleum):  decreased rosa, increased ROXIE, narrow ROXIE, decreased step length bilaterally, decreased stance time bilaterally and retrograde balance loss  Assistive Device Used:    rolling walker (RW)  Level of Assist:    Max A   Comment: Patient limited in distance due to fatigue and toe pain. Stair Training deferred, pt unsafe/not appropriate to complete stairs at this time    Therapeutic Exercise all completed bilaterally unless indicated  Ankle Pumps x 5 reps  Heel slides x 5 reps  LAQ x 5 reps    Balance  Sitting:  Fair -; Min A   Comments: Patient was given multidirectional reach outs in forward and lateral direction with controlled trunk swaying for 5 reps x 2 sets. Standing: Poor +; Max A   Comments: Patient was given marching with weight shifting to clear the feet, back and forward stepping exercises and forward reach outs for 5 reps x 2 sets. Patient Education      Role of PT, POC, Discharge recommendations, DC recommendations, safety awareness, transfer techniques, pacing activity and calling for assist with mobility. Positioning Needs       Pt in bed, alarm set, positioned in proper neutral alignment and pressure relief provided. Call light provided and all needs within reach    ROM Measurements N/A  Knee Flexion:  Knee Extension:     Activity Tolerance   Pt completed therapy session with Pain noted with walking. Other  N/A    Assessment :  Patient performed well today and needed 1 person assist for bed mobility and functional transfers.  Patient was able to maintain neutral trunk posture towards the end of the PT session with decreased retrograde trunk swaying and with minimal extensor posturing. Recommending SNF upon discharge as patient functioning well below baseline, demonstrates good rehab potential and unable to return home due to limited or no family support, inability to negotiate stairs to enter home/bedroom/bathroom, home environment not conducive to patient recovery and limited safety awareness. Goals (all goals ongoing unless otherwise indicated)  To be met in 3 visits:  1). Independent with LE Ex x 10 reps     To be met in 6 visits:  1). Supine to/from sit: Mod A   2). Sit to/from stand: Mod A   3). Bed to chair: Mod A   4). Gait: Ambulate 50 ft.  with  Mod A  and use of rolling walker (RW)  5). Tolerate B LE exercises 3 sets of 10-15 reps  6). Ascend/descend 1 steps with Max A  with use of hand rail bilateral and rolling walker (RW)    Plan   Continue with plan of care. Signature: Nichole Gonzalez MS PT, # [id number]      If patient discharges from this facility prior to next visit, this note will serve as the Discharge Summary.

## 2020-07-02 NOTE — FLOWSHEET NOTE
07/01/20 2115   Vital Signs   Temp 97.5 °F (36.4 °C)   Temp Source Oral   Pulse 64   Resp 16   BP (!) 106/58   BP Location Right upper arm   Level of Consciousness 0   MEWS Score 1   Oxygen Therapy   SpO2 96 %   O2 Device None (Room air)   Pt resting in bed, no acute distress.  Debbie Fermin

## 2020-07-02 NOTE — PROGRESS NOTES
Pt back from colonoscopy, received return call from Dr. Leila Holland, ok to remove ng tube, ng removed, dietary called to provide supper tray. Gabriela Lake RN\

## 2020-07-02 NOTE — DISCHARGE SUMMARY
Name:  Juan A Rudolph  Room:  0208/0208-01  MRN:    [id number]    Discharge Summary      This discharge summary is in conjunction with a complete physical exam done on the day of discharge. Discharging Physician: Dr. Lynch [place]: 6/27/2020  Discharge: 7/2/2020     HPI taken from admission H&P:    The patient is a 80 y.o. female w hx of chronic hematuria due to nephrolithiasis, depression, who presents w complaint of BRBPR.      Pt reports she had a bloody BM this am - clots and azar blod, that she is worried filled the toilet bowl. Per patient, she spent the rest of the morning in the bathroom but not sure if she actually had any more bloody BMs since that one episode. She denies any abd pain.      She reports she had to be taking mobic for her R shoulder pain and is worried this could have been contributory.      In ED FOBt was found positive. Hgb reassuring at 11.6 abd hemodynamics stable. Diagnoses this Admission and Hospital Course     GI Bleed  Acute blood loss anemia   - rectal bleed with clots   - h.h dropped from 11 to 6.6 , given 3 units PRBc since adm  - hb upto 7.8 and stable in last 24 hrs   - BP stable. EGD neg, sigmoidoscopy with blood,   - colonoscopy with normal terminal ileum, multiple diverticuli, internal hemorrhoids   - likely  diverticular vs hemorrhoidal   - Hold Nsaids  - resumed diet , avoid constipation and straining at stool   - added colace at dc     HTN   - hold PTA BP regimen.   - Resume as needed       UTI  - cx not done, on rocephin D5     R shoulder pain- xray with rotator cuff arthropathy   -  PRN tramadol.   - Avoid NSAIDs.       Abnormal right lung apex  - obtain ct chest today to rule out mass  - CT shows large R thyroid lobe extending into the mediastinum      Possible parkinsons disease  - prince pill rolling and resting tremor with rigidity R>left  - start low dose sinemet and monitor for improvement   - refer to neurology at NH     Procedures (Please Review Full Report for Details)  Colonoscopy     Consults    GI    Physical Exam at Discharge:    BP (!) 148/81   Pulse 75   Temp 99 °F (37.2 °C) (Oral)   Resp 16   Ht 5' 7\" (1.702 m)   Wt 126 lb 14.4 oz (57.6 kg)   SpO2 95%   BMI 19.88 kg/m²       General: Elderly female , appears fatigued and frail . awake, alert, oriented. NAD  Skin:  Warm and dry  Neck:  JVD absent. Neck supple  NG in place  Chest:  Clear to auscultation, respiration easy. No wheezes, rales or rhonchi. Cardiovascular:  RRR ,S1S2 normal  Abdomen:  Soft, non tender, non distended, BS +  Extremities: right shoulder limited movements with pain. Neck movements normal   Intact peripheral pulses. Brisk cap refill, < 2 secs  Neuro: non focal  prince pill rolling and resting tremor improved slightly today  Cog wheel rigidity and apathic face noted    CBC:   Recent Labs     07/02/20  0046 07/02/20  0609 07/02/20  1237   WBC  --  5.0  --    HGB 7.4* 7.6* 8.4*   HCT 22.6* 23.0* 25.4*   MCV  --  90.9  --    PLT  --  119*  --      BMP:   Recent Labs     07/02/20  0609      K 3.6      CO2 25   BUN 9   CREATININE 0.6     CULTURES  SARS-CoV-2: not detected     RADIOLOGY  CT CHEST WO CONTRAST   Final Result   Enlarged right thyroid lobe extending into the mediastinum corresponds to the   abnormality on the radiographs yesterday. No pulmonary mass or   lymphadenopathy. Dependent linear opacities in the lingula and right lower lobe, favoring   atelectasis. An inflammatory process is considered less likely. XR SHOULDER RIGHT (MIN 2 VIEWS)   Final Result   1. Right-sided rotator cuff arthropathy and degenerative changes as above. 2. No acute fracture. 3. ORIF hardware involving the proximal left humerus. 4. Atherosclerotic calcification of the aorta. 5. Mild opacity at the medial right lung apex. Further evaluation with PA   and lateral chest or CT chest is recommended to exclude lymphadenopathy or   mass in this region.    The findings were sent to the Radiology Results [address] at 4:02   pm on 6/29/2020to be communicated to a licensed caregiver. XR CHEST PORTABLE   Final Result   No radiographic evidence of acute pulmonary disease. Discharge Medications     Medication List      START taking these medications    carbidopa-levodopa  MG per tablet  Commonly known as:  SINEMET  Take 1 tablet by mouth 2 times daily     docusate 100 MG Caps  Commonly known as:  COLACE, DULCOLAX  Take 100 mg by mouth daily     hydrocortisone 2.5 % Crea rectal cream  Commonly known as:  ANUSOL-HC  Apply to hemorrhoids     pantoprazole 40 MG tablet  Commonly known as:  PROTONIX  Take 1 tablet by mouth every morning (before breakfast)  Start taking on:  July 3, 2020     polyethylene glycol 17 g packet  Commonly known as:  GLYCOLAX  Take 17 g by mouth daily as needed for Constipation     traMADol 50 MG tablet  Commonly known as:  ULTRAM  Take 1 tablet by mouth every 6 hours as needed for Pain for up to 3 days. CHANGE how you take these medications    amLODIPine-benazepril 5-20 MG per capsule  Commonly known as:  LOTREL  TAKE 1 CAPSULE BY MOUTH EVERY DAY  What changed:    · when to take this  · additional instructions        CONTINUE taking these medications    albuterol sulfate  (90 Base) MCG/ACT inhaler  Inhale 2 puffs into the lungs every 6 hours as needed for Wheezing     [employer] D3 50 MCG (2000 UT) Caps  Generic drug:  Cholecalciferol  TAKE ONE CAPSULE BY MOUTH DAILY     Milk of Magnesia 400 MG/5ML suspension  Generic drug:  magnesium hydroxide     * Roller Walker Misc  1 each by Does not apply route daily     * Wheelchair Cushion Misc  4 inch cushion for use on wheelchair. * Commode Bedside Misc  Bedside commode for patient use. SENNA CO     UNABLE TO FIND  Shower chair for patient use. * This list has 3 medication(s) that are the same as other medications prescribed for you.  Read the directions carefully, and ask your doctor or other care provider to review them with you. STOP taking these medications    aspirin 325 MG EC tablet     meloxicam 15 MG tablet  Commonly known as:  Mobic           Where to Get Your Medications      You can get these medications from any pharmacy    Bring a paper prescription for each of these medications  · traMADol 50 MG tablet     Information about where to get these medications is not yet available    Ask your nurse or doctor about these medications  · carbidopa-levodopa  MG per tablet  · docusate 100 MG Caps  · hydrocortisone 2.5 % Crea rectal cream  · pantoprazole 40 MG tablet  · polyethylene glycol 17 g packet       Discharged in stable condition to SNF    Follow Up:   Follow up with physician at SNF, GI OP as needed, neurology       Lucia Rojo MD 7/15/2020 7:17 PM

## 2020-07-02 NOTE — CARE COORDINATION
CTN received [facility] Predict Outcome on Niels [address]. Per navealth: \"Greater gains expected with SNF over [facility], tentative discharge plan of home alone. Currently requiring 2 person assist with gait/transfers. Will continue to follow for therapy progress, SNF may be needed before member is able to safely return home. \"     Report filed under media for review. Per chart review, inpatient therapy recommending SNF as well. Projected non-skilled caregiver needs post SNF: 4 hours/day  Anticipated length of stay in days at SNF: 16.4 avg  Therapy: 547 minutes per week    Report shared with discharge planner.     RARS 9%    Eunice Young RN  Care Transition Nurse  [phone] mobile    Future Appointments   Date Time Provider Lan Alonzo   8/27/2020  1:00 PM Arely Fletcher MD [facility]
DISCHARGE ORDER  Date/Time 2020 11:42 AM  Completed by: Jeanette Borja, Case Management    Patient Name: Julien Benson    : 1935    Admitting Diagnosis: Blood per rectum [K62.5]    Financial Payer Type : Medicare  Admit order Date and Status: 20 inpt  (verify MD's last order for status of admission/Traditional Medicare 3 day qualifying stay required for SNF)    Noted discharge order. Confirmed discharge plan  : Yes  with whom patient  If pt confirmed DC plan does family need to be contacted by CM Yes if yes who Kemi  Discharge Plan: Order for dc noted. Spoke with pt re dc and plan for [facility]. Pt confirms plan for STR at [facility]. Spoke with Lashanda at [facility] who states can accept today for STR. Spoke with pt katie Mcmullen who is aware of dc to [facility] and agreeable. Per Lashanda she will pull AVS and HENS from Epic and [employer] system as she has access. Chart reviewed and no toher dc needs identified. Was called to room per pt Merlinda Copier request. Per Aleyda Abt pt does not want to go to [facility] for STR. Pt states \"I thought they were talking about [facility] for assisted living\". Explained that per therapy she needs rebab. Explained that assisted living is for persons who can still care for self and ambulate safely. Explained that she need rehab for strengthen prior to return home or assisted living. Pt is agreeable to STR but now states does not want [facility]. States wants [facility]. Spoke with Lilian Gooden at [place]. [address] [place] [address] who will reviewe chart and return call to writer on aceptance/denial. Spoke with Mello Shaffer ([facility]) at [facility] with update and she will notify crew of delay. Spoke with Lilian Gooden at [facility]. who states can accept today for STR.  Spoke with Lashanda at [facility] to cancel referral. Attempted to call grandson per his request to acceptance/denial to [facility] but no answetr and no ability
INTERDISCIPLINARY PLAN OF CARE CONFERENCE    Date/Time: 6/29/2020 12:05 PM  Completed by: Sayra Dhaliwal, Case Management      Patient Name:  Terri Moore  YOB: 1935  Admitting Diagnosis: Blood per rectum [K62.5]     Admit Date/Time:  6/27/2020  1:53 PM    Chart reviewed. Interdisciplinary team met to discuss patient progress and discharge plans. Disciplines included Case Management, Nursing, and Dietitian. Current Status:ongoing    PT/OT recommendation:SNF    Anticipated Discharge Date: tbd  Expected D/C Disposition:  Skilled nursing facility  Confirmed plan with patient and/or family Yes  Discharge Plan Comments: Reviewed chart. Role of discharge planner explained and patient verbalized understanding. Pt is from home alone. Pt is aware that PT/OT recommends a SNF. Pt was given a SNF list and pt chose (1) [facility], (2) [facility]. CM called Susannahsahra Melissa with [facility] and Left message and faxed referral to her. Awaiting to hear if able to accept. Pt does not need a precert. Addendum 6/29/2020 1713: Per Susannah Melissa with [facility], unable to accept because she will need LTC and has an active APS case. Pt states that she would like [facility]. CM faxed referral to [facility]. Awaiting to hear if can accept. The Plan for Transition of Care is related to the following treatment goals: SNF    The Patient and/or patient representative pt was provided with a choice of provider and agrees   with the discharge plan. [x] Yes [] No    Freedom of choice list was provided with basic dialogue that supports the patient's individualized plan of care/goals, treatment preferences and shares the quality data associated with the providers.  [x] Yes [] No    Home O2 in place on admit: No  Pt informed of need to bring portable home O2 tank on day of discharge for nursing to connect prior to leaving:  Not Indicated  Verbalized agreement/Understanding:  Not Indicated
INTERDISCIPLINARY PLAN OF CARE CONFERENCE    Date/Time: 6/30/2020 9:06 AM  Completed by: Blanche Hoang, Case Management      Patient Name:  Vicki Andersen  YOB: 1935  Admitting Diagnosis: Blood per rectum [K62.5]     Admit Date/Time:  6/27/2020  1:53 PM    Chart reviewed. Interdisciplinary team met to discuss patient progress and discharge plans. Disciplines included Case Management, Nursing, and Dietitian. Current Status:ongoing    PT/OT recommendation:SNF    Anticipated Discharge Date: tbd  Expected D/C Disposition:  Skilled nursing facility  Confirmed plan with patient and/or family Yes  Discharge Plan Comments: Reviewed chart. Role of discharge planner explained and patient verbalized understanding. Pt is from home alone. Pt is aware that PT/OT recommends a SNF. Pt initially chose [facility], but they were unable to accept pt. Pt then hose [facility]. Per Maria Luisa Renee with [facility], she states that she can accept pt, +bed. Per Maria Luisa Renee, a Rapid Covid will be fine to obtain. CM ordered rapid Covid and informed Montrell Laird RN. Pt is currently down for an [facility]. The Plan for Transition of Care is related to the following treatment goals: SNF--[facility]    The Patient and/or patient representative pt was provided with a choice of provider and agrees   with the discharge plan. [x] Yes [] No    Freedom of choice list was provided with basic dialogue that supports the patient's individualized plan of care/goals, treatment preferences and shares the quality data associated with the providers.  [x] Yes [] No    Home O2 in place on admit: No  Pt informed of need to bring portable home O2 tank on day of discharge for nursing to connect prior to leaving:  Not Indicated  Verbalized agreement/Understanding:  Not Indicated
INTERDISCIPLINARY PLAN OF CARE CONFERENCE    Date/Time: 7/1/2020 12:21 PM  Completed by: Chela Jackson, Case Management      Patient Name:  Tani Ingram  YOB: 1935  Admitting Diagnosis: Blood per rectum [K62.5]     Admit Date/Time:  6/27/2020  1:53 PM    Chart reviewed. Interdisciplinary team met to discuss patient progress and discharge plans. Disciplines included Case Management, Nursing, and Dietitian. Current Status:ongoing    PT/OT recommendation:SNF    Anticipated Discharge Date: tbd  Expected D/C Disposition:  Skilled nursing facility  Confirmed plan with patient and/or family Yes  Discharge Plan Comments: Reviewed chart. Role of discharge planner explained and patient verbalized understanding. Pt is from home alone and plans to go to go to [facility]. +bed. Covid neg as of 6/30/2020      Addendum 7/1/2020 1342: Tate Amezcua with APS called to check on pt and she states that she is active with them. Mame Vasquez wants to be called upon discharge. Mame Vasquez is at [phone]. The Plan for Transition of Care is related to the following treatment goals: SNF    The Patient and/or patient representative pt was provided with a choice of provider and agrees   with the discharge plan. [x] Yes [] No    Freedom of choice list was provided with basic dialogue that supports the patient's individualized plan of care/goals, treatment preferences and shares the quality data associated with the providers.  [x] Yes [] No    Home O2 in place on admit: No  Pt informed of need to bring portable home O2 tank on day of discharge for nursing to connect prior to leaving:  Not Indicated  Verbalized agreement/Understanding:  Not Indicated
Pulmonary Rehab: No  Pain Clinic: No  [facility]: No    Wound Clinic: No     COVID SCREENING: No       Other: no    The Plan for Transition of Care is related to the following treatment goals: home with hhc    The Patient and/or patient representative  was provided with a choice of provider and agrees   with the discharge plan. [] Yes [x] No    DISCHARGE PLAN: Reviewed chart. Pt from home alone and plan return. Pt has [facility]ha for bathing and house cleaning only. Follow for possible [facility] vrs STR. Awaiting PT notes. Explained Case Management role/services.

## 2020-07-02 NOTE — PROGRESS NOTES
Progress Note    Admit Date:  6/27/2020    More rectal bleeding overnight  EGD neg  Sigmoidoscopy with blood   Colonoscopy with diverticulosis and hemorrhoids   , given 3  unit pRBC since adm, no drop since yesterday    Subjective:  Ms. Jessica Martinez seen up in bed,feels ok today    Tremors improved with sinemet      Objective:   /67   Pulse 63   Temp 98.5 °F (36.9 °C) (Oral)   Resp 16   Ht 5' 7\" (1.702 m)   Wt 126 lb 14.4 oz (57.6 kg)   SpO2 96%   BMI 19.88 kg/m²       Intake/Output Summary (Last 24 hours) at 7/2/2020 0753  Last data filed at 7/1/2020 2227  Gross per 24 hour   Intake 480 ml   Output --   Net 480 ml         Physical Exam:  General: Elderly female , appears very fatigued and frail . awake, alert, oriented. NAD  Skin:  Warm and dry  Neck:  JVD absent. Neck supple  NG in place  Chest:  Clear to auscultation, respiration easy. No wheezes, rales or rhonchi. Cardiovascular:  RRR ,S1S2 normal  Abdomen:  Soft, non tender, non distended, BS +  Extremities: right shoulder limited movements with pain. Neck movements normal   Intact peripheral pulses.  Brisk cap refill, < 2 secs  Neuro: non focal  prince pill rolling and resting tremor improved slightly today  Cog wheel rigidity and apathic face noted      Medications:   Scheduled Meds:   pantoprazole  40 mg Oral QAM AC    carbidopa-levodopa  1 tablet Oral BID    hydrocortisone   Rectal BID    sodium chloride flush  10 mL Intravenous 2 times per day    cefTRIAXone (ROCEPHIN) IV  1 g Intravenous Q24H    docusate sodium  100 mg Oral BID       Continuous Infusions:      Data:  CBC:   Recent Labs     07/01/20  1851 07/02/20  0046 07/02/20  0609   WBC  --   --  5.0   RBC  --   --  2.53*   HGB 9.0* 7.4* 7.6*   HCT 27.7* 22.6* 23.0*   MCV  --   --  90.9   RDW  --   --  13.9   PLT  --   --  119*     BMP:   Recent Labs     07/02/20  0609      K 3.6      CO2 25   BUN 9   CREATININE 0.6     BNP: No results for input(s): BNP in the last 72 hours.  PT/INR:   No results for input(s): PROTIME, INR in the last 72 hours. APTT: No results for input(s): APTT in the last 72 hours. CARDIAC ENZYMES:   No results for input(s): CKMB, CKMBINDEX, TROPONINI in the last 72 hours. Invalid input(s): CKTOTAL;3  FASTING LIPID PANEL:  Lab Results   Component Value Date    CHOL 150 01/20/2018    HDL 47 01/20/2018    TRIG 56 01/20/2018     LIVER PROFILE:   No results for input(s): AST, ALT, ALB, BILIDIR, BILITOT, ALKPHOS in the last 72 hours. Radiology  CT CHEST WO CONTRAST   Final Result   Enlarged right thyroid lobe extending into the mediastinum corresponds to the   abnormality on the radiographs yesterday. No pulmonary mass or   lymphadenopathy. Dependent linear opacities in the lingula and right lower lobe, favoring   atelectasis. An inflammatory process is considered less likely. XR SHOULDER RIGHT (MIN 2 VIEWS)   Final Result   1. Right-sided rotator cuff arthropathy and degenerative changes as above. 2. No acute fracture. 3. ORIF hardware involving the proximal left humerus. 4. Atherosclerotic calcification of the aorta. 5. Mild opacity at the medial right lung apex. Further evaluation with PA   and lateral chest or CT chest is recommended to exclude lymphadenopathy or   mass in this region. The findings were sent to the Radiology Results [address] at 4:02   pm on 6/29/2020to be communicated to a licensed caregiver. XR CHEST PORTABLE   Final Result   No radiographic evidence of acute pulmonary disease. Colon -         1. Multiple diverticuli throughout the colon  2. Normal terminal ileum, no blood seen throughout the colon  3.   Medium internal hemorrhoids      Assessment:  Active Problems:    Blood per rectum    BRBPR (bright red blood per rectum)    Acute blood loss anemia    Parkinson disease, symptomatic (HCC)    NSAID long-term use    Acute pain of right shoulder  Resolved Problems:    * No

## 2020-07-02 NOTE — PROGRESS NOTES
Pt alert able to make needs known call light in reach bed alarm on  med's taken without issue will continue to monitor.

## 2020-07-02 NOTE — PROGRESS NOTES
IV removed from left forearm. Pt a/o times 4. Transportation here to take pt to [facility] per stretcher. Pt leaving in stable condition.  Jade Mccain

## 2020-07-02 NOTE — DISCHARGE INSTR - COC
(Fluad 65 yrs and older) 11/14/2019    PPD Test 12/17/2008    Pneumococcal Conjugate 13-valent (Melyssa Henry) 10/18/2017    Pneumococcal Polysaccharide ([id number]) 12/31/1996, 12/17/2008    Td, unspecified formulation 12/17/2008       Active Problems:  Patient Active Problem List   Diagnosis Code    Carpal tunnel syndrome of left wrist G56.02    Mild intermittent asthma J45.20    Essential hypertension I10    Goiter E04.9    Vitamin D deficiency E55.9    Staghorn calculus N20.0    Biceps rupture, proximal S46.119A    Incomplete tear of right rotator cuff M75.111    History of nonadherence to medical treatment Z91.19    B12 deficiency E53.8    Primary osteoarthritis of both knees M17.0    Irritable bowel syndrome with constipation K58.1    Environmental and seasonal allergies J30.89    Weakness generalized R53.1    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    S/P total knee arthroplasty, right Z96.651    Moderate malnutrition (HCC) E44.0    Blood per rectum K62.5    BRBPR (bright red blood per rectum) K62.5    Acute blood loss anemia D62    Parkinson disease, symptomatic ([facility] [address] G20    NSAID long-term use Z79.1    Acute pain of right shoulder M25.511       Isolation/Infection:   Isolation          No Isolation        Patient Infection Status     Infection Onset Added Last Indicated Last Indicated By Review Planned Expiration Resolved Resolved By    None active    Resolved    COVID-19 Rule Out 06/30/20 06/30/20 06/30/20 COVID-19 (Ordered)   06/30/20 Rule-Out Test Resulted          Nurse Assessment:  Last Vital Signs: BP (!) 148/81   Pulse 75   Temp 99 °F (37.2 °C) (Oral)   Resp 16   Ht 5' 7\" (1.702 m)   Wt 126 lb 14.4 oz (57.6 kg)   SpO2 95%   BMI 19.88 kg/m²     Last documented pain score (0-10 scale): Pain Level: 0  Last Weight:   Wt Readings from Last 1 Encounters:   07/01/20 126 lb 14.4 oz (57.6 kg)     Mental Status:  alert    IV Access:  - None    Nursing Mobility/ADLs:  Walking   Assisted  Transfer  Assisted  Bathing  Assisted  Dressing  Assisted  Toileting  Assisted  Feeding  Assisted  Med Admin  Assisted  Med Delivery   Mix med's with applesauce    Wound Care Documentation and Therapy:        Elimination:  Continence:   · Bowel: No  · Bladder: No  Urinary Catheter: None   Colostomy/Ileostomy/Ileal Conduit: No       Date of Last BM:     Intake/Output Summary (Last 24 hours) at 7/2/2020 0938  Last data filed at 7/2/2020 0902  Gross per 24 hour   Intake 600 ml   Output --   Net 600 ml     I/O last 3 completed shifts: In: 480 [P.O.:480]  Out: -     Safety Concerns: At Risk for Falls    Impairments/Disabilities:       None    Nutrition Therapy:  Current Nutrition Therapy:   - Oral Diet:  General    Routes of Feeding: Oral  Liquids: Thin Liquids  Daily Fluid Restriction: no  Last Modified Barium Swallow with Video (Video Swallowing Test): not done    Treatments at the Time of Hospital Discharge:   Respiratory Treatments:   Oxygen Therapy:  is not on home oxygen therapy. Ventilator:    - No ventilator support    Rehab Therapies: Physical Therapy and Occupational Therapy  Weight Bearing Status/Restrictions: No weight bearing restirctions  Other Medical Equipment (for information only, NOT a DME order):     Other Treatments: ***    Patient's personal belongings (please select all that are sent with patient):  belongings     RN SIGNATURE:  {Esignature:[id number]}    CASE MANAGEMENT/SOCIAL WORK SECTION    Inpatient Status Date: 6/27/20      Readmission Risk Assessment Score:  Readmission Risk              Risk of Unplanned Readmission:        9           Discharging to Facility/ Agency   · Name: Fax: [phone]      / signature: Electronically signed by Rad Yarbrough RN on 7/2/20 at 3:26 PM EDT    PHYSICIAN SECTION    Prognosis: Good    Condition at Discharge: Stable    Rehab Potential (if transferring to Rehab): Good    Recommended Labs or Other Treatments After Discharge:  Cbc in 2 weeks  Avoid nsaids    See Dr. Angeline Pro in 4 weeks    Physician Certification: I certify the above information and transfer of Ramona Ruiz  is necessary for the continuing treatment of the diagnosis listed and that she requires [facility] for less 30 days.      Update Admission H&P: No change in H&P    PHYSICIAN SIGNATURE:  Electronically signed by Eulalia Lewis MD on 7/2/20 at 9:38 AM EDT

## 2020-07-02 NOTE — FLOWSHEET NOTE
07/02/20 0245   Vital Signs   Temp 98.5 °F (36.9 °C)   Temp Source Oral   Pulse 63   Resp 16   /67   BP Location Right upper arm   Level of Consciousness 0   MEWS Score 1   Oxygen Therapy   SpO2 96 %   O2 Device None (Room air)   Pt resting in bed, eyes closed. No acute distress.  Atul Horner

## 2020-07-02 NOTE — PROGRESS NOTES
EOB    Therapeutic Exercise:   Patient performed UB AROM in bilateral UEs to reach and weight shift to touch target with trunk flexion and rotation. Patient Education:   Role of OT  Recommendations for DC    Positioning Needs: In bed, call light and needs in reach. Family Present:  No    Assessment: Patient tolerated treatment well. To be discharged to SNF today per nursing. Patient improved in balance today. She reports stiffness and improved after stretching and ROM in UB and LB and trunk. Needing assist x1-2 for transfers and mobility and ADLs. Recommend continued therapy to improve independence with ADLs and transfers on acute and at SNF . GOALS  To be met in 3 Visits:  1). Bed to toilet/BSC: Min A , 2 persons and RW     To be met in 5 Visits:  1). Supine to/from Sit:             Min A   2). Upper Body Bathing:         Min A   3). Lower Body Bathing: Mod A   4). Upper Body Dressing:       Min A to mod   5). Lower Body Dressing: Mod A   6).  Pt to demonstrate UE exs x 15 reps with minimal cues      Plan: cont with POC      Sonia Wyatt, OTR/L [id number]        If patient discharges from this facility prior to next visit, this note will serve as the Discharge Summary

## 2020-07-06 NOTE — TELEPHONE ENCOUNTER
Called [facility] & informed patient discharged 7/2/2020 & found note for Nurse Primitivo Ortiz at [place]. [facility] [phone]

## 2020-08-14 ENCOUNTER — OFFICE VISIT (OUTPATIENT)
Dept: ORTHOPEDIC SURGERY | Age: 85
End: 2020-08-14
Payer: MEDICARE

## 2020-08-14 VITALS — HEIGHT: 67 IN | BODY MASS INDEX: 19.78 KG/M2 | WEIGHT: 126 LBS

## 2020-08-14 PROCEDURE — 99214 OFFICE O/P EST MOD 30 MIN: CPT | Performed by: PHYSICIAN ASSISTANT

## 2020-08-14 PROCEDURE — G8427 DOCREV CUR MEDS BY ELIG CLIN: HCPCS | Performed by: PHYSICIAN ASSISTANT

## 2020-08-14 PROCEDURE — G8420 CALC BMI NORM PARAMETERS: HCPCS | Performed by: PHYSICIAN ASSISTANT

## 2020-08-14 PROCEDURE — 1123F ACP DISCUSS/DSCN MKR DOCD: CPT | Performed by: PHYSICIAN ASSISTANT

## 2020-08-14 PROCEDURE — 4040F PNEUMOC VAC/ADMIN/RCVD: CPT | Performed by: PHYSICIAN ASSISTANT

## 2020-08-14 PROCEDURE — G8399 PT W/DXA RESULTS DOCUMENT: HCPCS | Performed by: PHYSICIAN ASSISTANT

## 2020-08-14 PROCEDURE — 1036F TOBACCO NON-USER: CPT | Performed by: PHYSICIAN ASSISTANT

## 2020-08-14 PROCEDURE — 1090F PRES/ABSN URINE INCON ASSESS: CPT | Performed by: PHYSICIAN ASSISTANT

## 2020-08-14 RX ORDER — METHYLPREDNISOLONE ACETATE 40 MG/ML
80 INJECTION, SUSPENSION INTRA-ARTICULAR; INTRALESIONAL; INTRAMUSCULAR; SOFT TISSUE ONCE
Status: COMPLETED | OUTPATIENT
Start: 2020-08-14 | End: 2020-08-14

## 2020-08-14 RX ORDER — LIDOCAINE HYDROCHLORIDE 10 MG/ML
20 INJECTION, SOLUTION INFILTRATION; PERINEURAL ONCE
Status: COMPLETED | OUTPATIENT
Start: 2020-08-14 | End: 2020-08-14

## 2020-08-14 RX ORDER — BUPIVACAINE HYDROCHLORIDE 2.5 MG/ML
30 INJECTION, SOLUTION INFILTRATION; PERINEURAL ONCE
Status: COMPLETED | OUTPATIENT
Start: 2020-08-14 | End: 2020-08-14

## 2020-08-14 RX ADMIN — METHYLPREDNISOLONE ACETATE 80 MG: 40 INJECTION, SUSPENSION INTRA-ARTICULAR; INTRALESIONAL; INTRAMUSCULAR; SOFT TISSUE at 10:19

## 2020-08-14 RX ADMIN — BUPIVACAINE HYDROCHLORIDE 75 MG: 2.5 INJECTION, SOLUTION INFILTRATION; PERINEURAL at 10:19

## 2020-08-14 RX ADMIN — LIDOCAINE HYDROCHLORIDE 20 ML: 10 INJECTION, SOLUTION INFILTRATION; PERINEURAL at 10:20

## 2020-08-14 NOTE — PROGRESS NOTES
CHIEF COMPLAINT:    Chief Complaint   Patient presents with    Knee Pain     CK LEFT KNEE       HISTORY OF PRESENT ILLNESS:                The patient is a 80 y.o. female this is a known patient of ours who has severe arthritis of her knees. She actually had a right total knee arthroplasty back in March and we never saw her postoperatively. Initially postoperatively, she spent about 30 days at the [place] rehab facility and was actually walking. Then however she suffered a rectal bleed and had another hospitalization and this has affected her progress. She just was released from another rehab facility where she states she felt like she did not get as frequent PT. Shahzad Beltre She has absolutely no pain in her aright knee and is tearfully happy. She states however she still has functional limitations because of her LEFT knee. She states she feels as though her right knee is strong enough to walk but the LEFT knee limits her. She has seen her family doctor for follow-up in the interim. She is interested in a LEFT knee cortisone injection today. Today, her grandson Norberto De La Cruz is with her. He states that he is going to be helping to some degree with things around the house. She does have some continued services including housecleaning and meal delivery through [employer] services.   Past Medical History:   Diagnosis Date    Anxiety 7/20/2012    Arthritis     Asthma     Carpal tunnel syndrome of left wrist 10/6/2011    Depression 5/2/2012    GERD (gastroesophageal reflux disease) 9/4/2013    Hematuria     chronic from stone    History of nonadherence to medical treatment 10/11/2014    Hypertension     Kidney stone     saw Dr Jerome - 6 cm stone    Thyroid disease     goiter    Tight ring on finger     unable to remove - left hand    Wears dentures     full upper    Wears glasses     for reading          The pain assessment was noted & is as follows:  Pain Assessment  Location of Pain: Knee  Location Modifiers: Left  Severity of Pain: 6  Quality of Pain: Aching  Duration of Pain: Persistent  Frequency of Pain: Constant]      Work Status/Functionality:     Past Medical History: Medical history form was reviewed today & can be found in the media tab  Past Medical History:   Diagnosis Date    Anxiety 7/20/2012    Arthritis     Asthma     Carpal tunnel syndrome of left wrist 10/6/2011    Depression 5/2/2012    GERD (gastroesophageal reflux disease) 9/4/2013    Hematuria     chronic from stone    History of nonadherence to medical treatment 10/11/2014    Hypertension     Kidney stone     saw Dr Aroldo Rodriguez - 6 cm stone    Thyroid disease     goiter    Tight ring on finger     unable to remove - left hand    Wears dentures     full upper    Wears glasses     for reading      Past Surgical History:     Past Surgical History:   Procedure Laterality Date    CARPAL TUNNEL RELEASE Right 09/14/2011    OPEN REDUCTION INTERNAL FIXATION RIGHT WRIST (RUSSO'S FRACTURE)    COLONOSCOPY N/A 7/1/2020    COLON (2:00) performed by Munira Douglas DO at [place] [address] 6/30/2020    FLEX SIG W/ANES.  performed by Munira Douglas DO at [facility] 3/10/2020    RIGHT TOTAL KNEE REPLACEMENT       KANG & NEPHEW performed by Kamilla Koenig MD at [facility] 6/30/2020    EGD BIOPSY performed by Munira Douglas DO at [place] [address]  6/30/2020    EGD DILATION BALLOON performed by Munira Douglas DO at [facility]     Current Medications:     Current Outpatient Medications:     pantoprazole (PROTONIX) 40 MG tablet, Take 1 tablet by mouth every morning (before breakfast), Disp: 30 tablet, Rfl: 3    hydrocortisone (ANUSOL-HC) 2.5 % CREA rectal cream, Apply to hemorrhoids, Disp: 2 Tube, Rfl: 0    docusate sodium (COLACE, DULCOLAX) 100 MG CAPS, Take 100 mg by mouth daily, Disp: 10 capsule, Rfl: 0    carbidopa-levodopa (SINEMET)  MG per tablet, Take 1 tablet by mouth 2 times daily, Disp: 90 tablet, Rfl: 0    Cholecalciferol (CVS D3) 50 MCG (2000 UT) CAPS, TAKE ONE CAPSULE BY MOUTH DAILY, Disp: 90 capsule, Rfl: 3    amLODIPine-benazepril (LOTREL) 5-20 MG per capsule, TAKE 1 CAPSULE BY MOUTH EVERY DAY (Patient taking differently: daily Patient only takes if her systolic blood pressure is above 110.), Disp: 90 capsule, Rfl: 0    UNABLE TO FIND, Shower chair for patient use., Disp: 1 each, Rfl: 0    Misc. Devices [facility] CUSHION) MISC, 4 inch cushion for use on wheelchair. , Disp: 1 each, Rfl: 0    Misc. Devices (COMMODE BEDSIDE) MISC, Bedside commode for patient use., Disp: 1 each, Rfl: 0    albuterol sulfate  (90 Base) MCG/ACT inhaler, Inhale 2 puffs into the lungs every 6 hours as needed for Wheezing, Disp: 1 Inhaler, Rfl: 1    SENNA CO, Take 1 tablet by mouth as needed , Disp: , Rfl:     magnesium hydroxide (MILK OF MAGNESIA) 400 MG/5ML suspension, Take 30 mLs by mouth daily as needed for Constipation, Disp: 1 Bottle, Rfl: 0    Misc. Devices (ROLLER WALKER) MISC, 1 each by Does not apply route daily, Disp: 1 each, Rfl: 0  Allergies:  Hydrochlorothiazide  Social History:    reports that she has never smoked. She has never used smokeless tobacco. She reports that she does not drink alcohol or use drugs. Family History:   Family History   Problem Relation Age of Onset    Other Mother         scleroderma    Heart Disease Father         MI    Heart Attack Son 43    Diabetes Son        REVIEW OF SYSTEMS:   For new problems, a full review of systems will be found scanned in the patient's chart.   CONSTITUTIONAL: Denies unexplained weight loss, fevers, chills   NEUROLOGICAL: Denies unsteady gait or progressive weakness  SKIN: Denies skin changes, delayed healing, rash, itching       PHYSICAL EXAM:    Vitals: Height 5' 7\" (1.702 m), weight 126 lb (57.2 kg), not currently breastfeeding. GENERAL EXAM:  · General Apparence: Patient is adequately groomed with no evidence of malnutrition. · Orientation: The patient is oriented to time, place and person. · Mood & Affect:The patient's mood and affect are appropriate       LEFT knee PHYSICAL EXAMINATION:  · Inspection: Moderate swelling    · Palpation: Tender medially and laterally with palpable crepitus    · Range of Motion: LEFT knee range of motion is 10 to 60 degrees    · Strength: Extensor mechanism is intact    · Special Tests: Ligamentously stable, negative Homans sign          · Skin:  There are no rashes, ulcerations or lesions. · There are no dysvascular changes     Gait & station: She is in a wheelchair today      Additional Examinations:        Right Lower Extremity: Well-healed surgical incision, really no significant swelling of the knee. Range of motion is 0 to 110 degrees. Extensor mechanism intact. Negative Homans sign. Diagnostic Testing:      Orders     Orders Placed This Encounter   Procedures    US ARTHR/ASP/INJ MAJOR JNT/BURSA LEFT     Order Specific Question:   Reason for exam:     Answer:   pain         Assessment / Treatment Plan:     1. Status post aright total knee arthroplasty-the patient's aright knee is doing extremely well. 2.  Significant LEFT knee osteoarthritis: We are going to proceed with a LEFT knee cortisone injection today. Follow-up in 6 weeks. We discussed the possibility of LEFT total knee replacement in the future given her functional limitations. She does have home physical therapy starting this next week    I discussed in detail the risks, benefits and complications of an injection which included but are not limited to infection, skin reactions, hot swollen joint, and anaphylaxis with the patient. The patient verbalized understanding and gave informed consent for the injection.  The patient's knee was flexed to 90° and the skin prepped using sterile alcohol solution. A sterile 22-gauge needle was inserted into the knee and the mixture of 4 mL of 2% Carbocaine, 4 mL of 0.25% Marcaine, and 80 mg of Depo-Medrol was injected under sterile technique. The needle was withdrawn and the puncture site sealed with a Band-Aid. Technique: Under sterile conditions a SonRep ultrasound unit with a variable frequency (6.0-15.0 MHz) linear transducer was used to localize the placement of a 22-gauge needle into the Left  knee joint. Findings: Successful needle placement for knee injection. Final images were taken and saved for permanent record. The patient tolerated the injection well. The patient was instructed to call the office immediately if there is any pain, redness, warmth, fever, or chills. I spent 25 minutes face-to-face with the patient and greater than 50% of that time was spent counseling/coordinating care for the above-stated diagnosis and treatment.

## 2020-08-17 ENCOUNTER — TELEPHONE (OUTPATIENT)
Dept: INTERNAL MEDICINE CLINIC | Age: 85
End: 2020-08-17

## 2020-08-17 ENCOUNTER — CARE COORDINATION (OUTPATIENT)
Dept: CARE COORDINATION | Age: 85
End: 2020-08-17

## 2020-08-17 NOTE — TELEPHONE ENCOUNTER
Rowena Cedeño called to voice concern for patient. States she is very weak and unable to complete tasks such as ambulating to bathroom, opening her phone or brushing teeth. States bathroom commode is broken. Also wanted update on skilled care assistance since she was discharged Wednesday from nursing home. Explained we have not received any requests for verbal orders nor faxes. Vashti Arrietayolanda states she called the facility and we should have a fax from them on Friday. Informed her we are not open Friday-Sunday but will check the stack of faxes we have. Rowena Cedeño informs patient does have Parkinson's diagnosis and patient was given a neuro referral- do we recommend anyone in particular? Explained best to check with insurance. States not getting anywhere with daughter and grandson but not much help. Explained it would be best to complete a virtual visit at this time to be updated on patients care. unfortunately no one to help complete this task and patient too weak to open own flip phone. Please advise if maybe Corina RN care coordination would be beneficial in helping with patient.

## 2020-08-17 NOTE — CARE COORDINATION
Ambulatory Care Coordination Note  8/17/2020  CM Risk Score: 6  Charlson 10 Year Mortality Risk Score: 79%     ACC: Terri Oscar RN    Summary Note: Dr Rubio  office received a call from [place], [place] with [facility]. She had questions regarding home care. Office if requesting assistance regarding home care. Attempted to call pt and left message. Pt returning call and is aware that home care will be out today. Verbalizes understanding of same. Spoke with Celsa from [facility] home care agency. Reports just received information from [facility] this morning. Khloe Armen with [facility]  will be out to see pt today. Celsa reports they are very familiar with pt and has had her numerous times. Attempted to call pt back regarding hospice or LTC. No answer at this time. Forwarded to Dr Harris. Goals Addressed    None         Prior to Admission medications    Medication Sig Start Date End Date Taking? Authorizing Provider   pantoprazole (PROTONIX) 40 MG tablet Take 1 tablet by mouth every morning (before breakfast) 7/3/20   Eulalia Lewis MD   hydrocortisone (ANUSOL-HC) 2.5 % CREA rectal cream Apply to hemorrhoids 7/2/20   Eulalia Lewis MD   docusate sodium (COLACE, DULCOLAX) 100 MG CAPS Take 100 mg by mouth daily 7/2/20   Eulalia Lewis MD   carbidopa-levodopa (SINEMET)  MG per tablet Take 1 tablet by mouth 2 times daily 7/2/20   Eulalia Lewis MD   Cholecalciferol (CVS D3) 48 MCG (2000 UT) CAPS TAKE ONE CAPSULE BY MOUTH DAILY 6/9/20   Diane Harris MD   amLODIPine-benazepril (LOTREL) 5-20 MG per capsule TAKE 1 [address]  Patient taking differently: daily Patient only takes if her systolic blood pressure is above 110. 5/21/20   Lisbeth Harrsi MD   UNABLE TO [address] chair for patient use. 5/21/20   Lisbeth Harris MD   [facility]. Devices [facility] CUSHION) MISC 4 inch cushion for use on wheelchair.  5/21/20   Lisbeth Harris MD Misc. Devices (COMMODE BEDSIDE) [facility] Bedside commode for patient use. 5/21/20   Alessio Harris MD   albuterol sulfate  (90 Base) MCG/ACT inhaler Inhale 2 puffs into the lungs every 6 hours as needed for Wheezing 3/4/20   Alessio Harris MD   SENNA CO Take 1 tablet by mouth as needed     Historical Provider, MD   magnesium hydroxide (MILK OF MAGNESIA) 400 MG/5ML suspension Take 30 mLs by mouth daily as needed for Constipation 8/29/18   Alessio Harris MD   [facility] [place] [facility].  Devices ([place]) MISC 1 each by Does not apply route daily 7/19/18   Alessio Harris MD       Future Appointments   Date Time Provider Lan Alonzo   8/27/2020  1:00 PM Alessio Harris MD [facility]   9/25/2020  9:15 AM MD SHERRELL Simmons AND [facility]

## 2020-08-17 NOTE — TELEPHONE ENCOUNTER
Update from Baldemar [facility] care coordinator:  Lashay Barton with Alexandra Pryor from [facility] home care agency. Reports just received information from [facility] this morning. Ashley Mayer with [facility]  will be out to see pt today. Also given hospice [facility] [phone], if family wishes to use them. NO FAMILY ON HIPPA COMMUNICATION- ONLY SPEAK WITH PATIENT.

## 2020-08-17 NOTE — TELEPHONE ENCOUNTER
Keli Mcgowan called again from [facility] regarding patient. She did call nursing home & they stated that [facility]  was able to brush her teeth, etc. Upon discharge, again no shower chair was ordered & patient desperately needs. Recently diagnosed with Parkinson Disease. Asks for referral for Neurology due to recent diagnosis. Upon home visit patient complain of pain, and having issues taking care of herself, commode full (& unable to change depends on her own). Charla Khan is supposed to be coming out to the house for skilled care. Will attempt to schedule patient for a Video Visit on a day when skilled nursing is out there to take vitals or the following day to have recent vitals, Keli Mcgowan can coordinate to be there to assist or family member. Patient's only living daughter (out of state) Kenneth Hooks [phone] did call nursing home & is on a call list for \"[place]\" nursing home, family agrees patient needs hospice or long term nursing facility. Keli Mcgowan again says Janeth Palacios can call her anytime, she is mostly in office today to work on Danae's case.

## 2020-08-19 ENCOUNTER — VIRTUAL VISIT (OUTPATIENT)
Dept: INTERNAL MEDICINE CLINIC | Age: 85
End: 2020-08-19
Payer: MEDICARE

## 2020-08-19 PROCEDURE — 99443 PR PHYS/QHP TELEPHONE EVALUATION 21-30 MIN: CPT | Performed by: INTERNAL MEDICINE

## 2020-08-19 RX ORDER — ACETAMINOPHEN 500 MG
500 TABLET ORAL DAILY
COMMUNITY

## 2020-08-19 RX ORDER — AMLODIPINE BESYLATE AND BENAZEPRIL HYDROCHLORIDE 5; 20 MG/1; MG/1
CAPSULE ORAL
Qty: 90 CAPSULE | Refills: 1 | Status: SHIPPED | OUTPATIENT
Start: 2020-08-19 | End: 2021-02-09 | Stop reason: SDUPTHER

## 2020-08-19 RX ORDER — CELECOXIB 200 MG/1
200 CAPSULE ORAL DAILY
Qty: 90 CAPSULE | Refills: 1 | Status: SHIPPED | OUTPATIENT
Start: 2020-08-19 | End: 2021-02-09

## 2020-08-19 NOTE — PROGRESS NOTES
Component Value Date     07/02/2020    K 3.6 07/02/2020     07/02/2020    CO2 25 07/02/2020    BUN 9 07/02/2020    CREATININE 0.6 07/02/2020    GLUCOSE 94 07/02/2020    CALCIUM 7.9 (L) 07/02/2020     Lab Results   Component Value Date    CHOL 150 01/20/2018    TRIG 56 01/20/2018    HDL 47 01/20/2018    LDLCALC 92 01/20/2018     Lab Results   Component Value Date    ALT 9 (L) 06/27/2020    AST 13 (L) 06/27/2020     Lab Results   Component Value Date    TSH 0.57 05/19/2015    TSH 1.18 10/06/2011    T4FREE 1.05 10/06/2011    T4FREE 1.05 10/06/2011     Lab Results   Component Value Date    WBC 5.0 07/02/2020    HGB 7.6 (L) 07/02/2020    HCT 23.5 (L) 07/02/2020    MCV 90.9 07/02/2020     (L) 07/02/2020     Lab Results   Component Value Date    INR 1.04 06/27/2020    INR 0.98 02/25/2020      No results found for: PSA   No results found for: [facility]     Patient Active Problem List   Diagnosis    Carpal tunnel syndrome of left wrist    Mild intermittent asthma    Essential hypertension    Goiter    Vitamin D deficiency    Staghorn calculus    Biceps rupture, proximal    Incomplete tear of right rotator cuff    History of nonadherence to medical treatment    B12 deficiency    Primary osteoarthritis of both knees    Irritable bowel syndrome with constipation    Environmental and seasonal allergies    Weakness generalized    Status post total right knee replacement    S/P total knee arthroplasty, right    Moderate malnutrition ([address]    Blood per rectum    BRBPR (bright red blood per rectum)    Acute blood loss anemia    Parkinson disease, symptomatic (HCC)    NSAID long-term use    Acute pain of right shoulder       Allergies   Allergen Reactions    Hydrochlorothiazide      Constipation and severe leg cramps     Outpatient Medications Marked as Taking for the 8/19/20 encounter (Virtual Visit) with Pat Harris MD   Medication Sig Dispense Refill    acetaminophen (TYLENOL) 500 MG PT    Parkinson disease [facility])  -     [facility] Neurology        Return Nov 17 at 1 PM Telephone visit Arthritis, BP, weakness.

## 2020-08-24 ENCOUNTER — TELEPHONE (OUTPATIENT)
Dept: INTERNAL MEDICINE CLINIC | Age: 85
End: 2020-08-24

## 2020-08-24 NOTE — TELEPHONE ENCOUNTER
Incoming call from PT, verbal okay given that  will follow & sign orders. (2x week for 1-2 weeks, then 1x week for 4 weeks) will be faxed to our office. PT was in for eval last week as ordered. referred by Tatum Lind following discharge from [place] [address]. [facility]. Patient was prescribed sinemet 10-100mg tablets bid for treatment of recent diagnosis of Parkinson's. Per Physical Therapist patient was noticeably better while on this medication, she has been out for a few days & patient & PT are requesting refills to be sent into her pharmacy. Please advise if refills to be sent.

## 2020-08-26 ENCOUNTER — TELEPHONE (OUTPATIENT)
Dept: INTERNAL MEDICINE CLINIC | Age: 85
End: 2020-08-26

## 2020-08-26 ENCOUNTER — CARE COORDINATION (OUTPATIENT)
Dept: CASE MANAGEMENT | Age: 85
End: 2020-08-26

## 2020-08-26 NOTE — CARE COORDINATION
GARRICK BOLAND EMAIL:    Facility Discharging: [facility]  Discharge Date: 8/13/2020  Services at Discharge: [facility], non skilled services through [facility]  Patient Agreeable to Calls: Yes      BRIEF BACKGROUND:     79 yo F dx with rectal bleeding. Pt has a pertinent dx of Parkinson's disease. Pt. resides alone in an apartment with nonskilled assistance and intermittent assistance from family (less than 24/7 care). At discharge from SNF pts functional levels were as follows: CGA ambulation  feet w/FWW roll, CGA/SBA transfers, S/u-SBA ADLs. Pt. demonstrated inconsistency with her ambulation and no righting reactions with loss of balance.  Pt. declined alternative d/c plans of snf and LTC

## 2020-08-27 ENCOUNTER — CARE COORDINATION (OUTPATIENT)
Dept: CASE MANAGEMENT | Age: 85
End: 2020-08-27

## 2020-08-28 ENCOUNTER — CARE COORDINATION (OUTPATIENT)
Dept: CASE MANAGEMENT | Age: 85
End: 2020-08-28

## 2020-08-28 NOTE — CARE COORDINATION
[facility] Transitions Initial Follow Up Call    Call within 2 business days of discharge: Yes    Patient: Vicki Andersen Patient : 1935   MRN: [id number]  Reason for Admission: Rectal Bleeding  Discharge Date: 20 RARS: Readmission Risk Score: 14      Last Discharge [address] [address]       Complaint Diagnosis Description Type Department Provider    20 Rectal Bleeding; Diarrhea BRBPR (bright red blood per rectum) . .. ED to Hosp-Admission (Discharged) (ADMITTED) [facility] 2 Julius Marcial MD; Alberto Hui. ..      2nd and final attempt to contact for a initial transition call. [facility] with contact information.  SETH De Leon RN  Care Transition Nurse [phone]         Spoke with: [facility]    Facility: [facility]    Non-face-to-face services provided:      Care Transitions 24 Hour Call    Do you have all of your prescriptions and are they filled?:  Yes  Care Transitions Interventions         Follow Up  Future Appointments   Date Time Provider Lan Alonzo   2020  9:15 AM Darcie Wong MD [facility]   10/6/2020 12:15 PM Brii Vargas MD AND NEURO [facility]   2020  1:00 PM Klaudia Aldana MD [facility] AND [facility] GAYE Teran, SURYA

## 2020-09-18 ENCOUNTER — HOSPITAL ENCOUNTER (EMERGENCY)
Age: 85
Discharge: HOME | End: 2020-09-18
Payer: MEDICARE

## 2020-09-18 ENCOUNTER — APPOINTMENT (OUTPATIENT)
Dept: GENERAL RADIOLOGY | Age: 85
End: 2020-09-18
Payer: MEDICARE

## 2020-09-18 VITALS
RESPIRATION RATE: 14 BRPM | HEART RATE: 64 BPM | SYSTOLIC BLOOD PRESSURE: 145 MMHG | DIASTOLIC BLOOD PRESSURE: 76 MMHG | TEMPERATURE: 97.8 F | BODY MASS INDEX: 19.73 KG/M2 | WEIGHT: 126 LBS | OXYGEN SATURATION: 100 %

## 2020-09-18 PROCEDURE — 99283 EMERGENCY DEPT VISIT LOW MDM: CPT

## 2020-09-18 PROCEDURE — 73562 X-RAY EXAM OF KNEE 3: CPT

## 2020-09-18 PROCEDURE — 73560 X-RAY EXAM OF KNEE 1 OR 2: CPT

## 2020-09-18 RX ORDER — COLCHICINE 0.6 MG/1
TABLET ORAL
Qty: 30 TABLET | Refills: 0 | Status: SHIPPED | OUTPATIENT
Start: 2020-09-18 | End: 2020-10-06

## 2020-09-18 RX ORDER — PREDNISONE 10 MG/1
TABLET ORAL
Qty: 30 TABLET | Refills: 0 | Status: SHIPPED | OUTPATIENT
Start: 2020-09-18 | End: 2020-09-28

## 2020-09-18 RX ORDER — TRAMADOL HYDROCHLORIDE 50 MG/1
50 TABLET ORAL EVERY 6 HOURS PRN
Qty: 20 TABLET | Refills: 0 | Status: SHIPPED | OUTPATIENT
Start: 2020-09-18 | End: 2020-09-21

## 2020-09-18 ASSESSMENT — ENCOUNTER SYMPTOMS
SHORTNESS OF BREATH: 0
NAUSEA: 0
BACK PAIN: 0
VOMITING: 0
ABDOMINAL PAIN: 0
COLOR CHANGE: 0
COUGH: 0
WHEEZING: 0
DIARRHEA: 0

## 2020-09-18 ASSESSMENT — PAIN SCALES - GENERAL: PAINLEVEL_OUTOF10: 8

## 2020-09-18 NOTE — ED PROVIDER NOTES
**ADVANCED PRACTICE PROVIDER, I HAVE EVALUATED THIS [facility]  ED  EMERGENCY DEPARTMENT ENCOUNTER      Pt Name: Jacob Little  [id number]:[id number]  Nicolastrongfjaxon 1935  Date of evaluation: 9/18/2020  Provider: RACHEL Butts - HENRRY      Chief Complaint:    Chief Complaint   Patient presents with    Knee Pain     right knee feels hot to patient, started last night, no redness or swelling noted at this time, no fall, no injury       Nursing Notes, Past Medical Hx, Past Surgical Hx, Social Hx, Allergies, and Family Hx were all reviewed and agreed with or any disagreements were addressed in the HPI.    HPI:  (Location, Duration, Timing, Severity, Quality, Assoc Sx, Context, Modifying factors)  This is a  80 y.o. female who presents today with right knee pain, patient states that she had right total knee replacement sometime last year, she states last night she started having some discomfort in her total knee replacement site, she states that she felt her knee and it felt hot to touch. She denies any falls traumas or injuries. She states she has pain with any ambulation on the knee, she is able to extend however flexion elicits worsening pain. She rates the pain an 8 out of 10, denies any chest pain pleuritic chest pain or shortness of breath, no recent cough congestion fever chills, has not taking any medication. Rates the pain an 8 on a 10 while at rest, no additional complaints, no additional aggravating or alleviating factors. The patient presents awake, alert in the no acute distress or toxic appearance.     PastMedical/Surgical History:      Diagnosis Date    Anxiety 7/20/2012    Arthritis     Asthma     Carpal tunnel syndrome of left wrist 10/6/2011    Depression 5/2/2012    GERD (gastroesophageal reflux disease) 9/4/2013    Hematuria     chronic from stone    History of nonadherence to medical treatment 10/11/2014    Hypertension     Kidney stone     saw  Jonathon - 6 cm stone    Thyroid disease     goiter    Tight ring on finger     unable to remove - left hand    Wears dentures     full upper    Wears glasses     for reading         Procedure Laterality Date    CARPAL TUNNEL RELEASE Right 09/14/2011    OPEN REDUCTION INTERNAL FIXATION RIGHT WRIST (RUSSO'S FRACTURE)    COLONOSCOPY N/A 7/1/2020    COLON (2:00) performed by Kaiden Galvan DO at [address] [facility] N/A 6/30/2020    FLEX SIG W/ANES. performed by Kaiden Galvan DO at [address] [facility] Right 3/10/2020    RIGHT TOTAL KNEE REPLACEMENT       KANG & NEPHEW performed by Sharifa Rivera MD at [address] [facility] 6/30/2020    EGD BIOPSY performed by Kaiden Galvan DO at [address] [facility]  6/30/2020    EGD DILATION BALLOON performed by Kaiden Galvan DO at [facility]       Medications:  Previous Medications    ACETAMINOPHEN (TYLENOL) 500 MG TABLET    Take 500 mg by mouth daily    ALBUTEROL SULFATE  (90 BASE) MCG/ACT INHALER    Inhale 2 puffs into the lungs every 6 hours as needed for Wheezing    AMLODIPINE-BENAZEPRIL (LOTREL) 5-20 MG PER CAPSULE    TAKE 1 CAPSULE BY MOUTH EVERY DAY    CARBIDOPA-LEVODOPA (SINEMET)  MG PER TABLET    Take 1 tablet by mouth 2 times daily    CELECOXIB (CELEBREX) 200 MG CAPSULE    Take 1 capsule by mouth daily    CHOLECALCIFEROL ([employer] D3) 50 MCG (2000 UT) CAPS    TAKE ONE CAPSULE BY MOUTH DAILY    MAGNESIUM HYDROXIDE (MILK OF MAGNESIA) 400 MG/5ML SUSPENSION    Take 30 mLs by mouth daily as needed for Constipation    MISC. DEVICES (COMMODE BEDSIDE) MISC    Bedside commode for patient use. MISC.  DEVICES (ROLLER WALKER) MISC    1 each by Does not apply route daily    PANTOPRAZOLE (PROTONIX) 40 MG TABLET    Take 1 tablet by mouth every morning (before breakfast)    SENNA CO    Take 1 tablet by mouth as needed UNABLE TO FIND    Shower chair for patient use. Review of Systems:  Review of Systems   Constitutional: Negative for chills and fever. HENT: Negative for congestion. Respiratory: Negative for cough, shortness of breath and wheezing. Cardiovascular: Negative for chest pain. Gastrointestinal: Negative for abdominal pain, diarrhea, nausea and vomiting. Musculoskeletal: Positive for arthralgias. Negative for back pain. Patient complains of right knee pain, patient states that she had right total knee replacement sometime last year, she states last night she started having some discomfort in her total knee replacement site, she states that she felt her knee and it felt hot to touch. She denies any falls traumas or injuries. Skin: Negative for color change. Neurological: Negative for weakness, numbness and headaches. Positives and Pertinent negatives as per HPI. Except as noted above in the ROS, problem specific ROS was completed and is negative. Physical Exam:  Physical Exam  Vitals signs and nursing note reviewed. Constitutional:       Appearance: She is well-developed. She is not diaphoretic. HENT:      Head: Normocephalic. Right Ear: External ear normal.      Left Ear: External ear normal.   Eyes:      General:         Right eye: No discharge. Left eye: No discharge. Neck:      Musculoskeletal: Normal range of motion. Cardiovascular:      Rate and Rhythm: Normal rate. Pulmonary:      Effort: Pulmonary effort is normal. No respiratory distress. Abdominal:      Palpations: Abdomen is soft. Musculoskeletal: Normal range of motion. Comments: Patient complains of right knee pain, however, there is no obvious swelling, break in skin integrity, erythema but the right knee appears to be warmer with palpation than when compared to the left knee, I do believe that this could be some underlying gouty arthritis. Compartments in the right leg are soft. Peripheral pulses are 2+, no obvious deformity, swelling or acute abnormality. Skin:     General: Skin is warm. Capillary Refill: Capillary refill takes less than 2 seconds. Coloration: Skin is not pale. Neurological:      Mental Status: She is alert and oriented to person, place, and time. Psychiatric:         Behavior: Behavior normal.         MEDICAL DECISION MAKING    Vitals:    Vitals:    09/18/20 1107 09/18/20 1230   BP: 136/84 (!) 145/76   Pulse: 67 64   Resp: 18 14   Temp: 97.8 °F (36.6 °C)    TempSrc: Oral    SpO2: 97% 100%   Weight: 126 lb (57.2 kg)        LABS:Labs Reviewed - No data to display     Remainder of labs reviewed and werenegative at this time or not returned at the time of this note. RADIOLOGY:   Non-plain film images such as CT, Ultrasound and MRI are read by the radiologist. Rolanda DAVIDSON, RACHEL - CNP have directly visualized the radiologic plain film image(s) with the below findings:        Interpretation per the Radiologist below, if available at the time of this note:    XR KNEE RIGHT (1-2 VIEWS)   Final Result   Total knee arthropasty without acute hardware complication. Small joint effusion           MEDICAL DECISION MAKING / ED COURSE:      PROCEDURES:   Procedures    None    Patient was given:  Medications - No data to display    Patient complains of right knee pain, patient states that she had right total knee replacement sometime last year, she states last night she started having some discomfort in her total knee replacement site, she states that she felt her knee and it felt hot to touch. She denies any falls traumas or injuries. After evaluation and examination the patient I ordered x-ray of the right knee, there is no obvious swelling, break in skin integrity, erythema but the right knee appears to be warmer with palpation than when compared to the left knee, I do believe that this could be some underlying gouty arthritis.   However, no concern [facility]  ED  [address] [facility] [address] [phone]  Go to   If symptoms worsen      DISCHARGE MEDICATIONS:  New Prescriptions    COLCHICINE (COLCRYS) 0.6 MG TABLET    One tablet per hour or every two hours until relief of gouty pain and inflammation, up to a total of 4 mg or until upset stomach occurs    PREDNISONE (DELTASONE) 10 MG TABLET    5 tabs po qam for 2 days then 4,3,2,1 tabs qam for 2 days each total of 10 days    TRAMADOL (ULTRAM) 50 MG TABLET    Take 1 tablet by mouth every 6 hours as needed for Pain for up to 3 days.        DISCONTINUED MEDICATIONS:  Discontinued Medications    No medications on file              (Please note the MDM and HPI sections of this note were completed with a voice recognition program.  Efforts were made to edit the dictations but occasionally words are mis-transcribed.)    Electronically signed, RACHEL Serna CNP,           RACHEL Serna CNP  09/18/20 6685

## 2020-09-18 NOTE — ED NOTES
Fall precautions in place, bed alarm and fall wristband in place. Pt declines fall socks at this time.      Geannie Collet, RN  09/18/20 2770

## 2020-09-18 NOTE — ED NOTES
Pt continues resting in bed at this time. Calling son for ride home.      Katty Burr RN  09/18/20 1300

## 2020-09-21 ENCOUNTER — TELEPHONE (OUTPATIENT)
Dept: INTERNAL MEDICINE CLINIC | Age: 85
End: 2020-09-21

## 2020-09-21 NOTE — TELEPHONE ENCOUNTER
Incoming call from [place] with [facility] [employer], patient complaint of Gout in right leg, she had it in the hospital but was told by pharmacy that prescription was $200 and patient could not afford. Patient does not remember the name of the medication. Also asking if gout would be something that skilled nursing would help with or medication. Patient states she went to [facility] ED for pain in her leg & they said it was gout per xray results. Area is hot to touch, right knee up to her groin. It hurts her to raise her leg to get in and out of bed.

## 2020-09-21 NOTE — TELEPHONE ENCOUNTER
She's already on Celebrex if Cochicine is too expensive.   It still painful, f/u in office or virtually

## 2020-09-24 NOTE — TELEPHONE ENCOUNTER
Sydni Cobos from [place] called & was informed patient is on medication celebrex if cochicine is too expensive, informed we can do in office if someone can bring her or help with virtual visit. FYI patient had a fall on Monday shortly after we spoke on the phone. She has a referral out for assisted living patient's family is trying to convince her since several accept medicaid and the family can't come out and take care of her.

## 2020-10-05 ENCOUNTER — TELEPHONE (OUTPATIENT)
Dept: INTERNAL MEDICINE CLINIC | Age: 85
End: 2020-10-05

## 2020-10-05 NOTE — TELEPHONE ENCOUNTER
Viki Del Rosario, if able to get a doctor for house call (switch doctors), it may be a better option if she has a hard time coming to our office.

## 2020-10-05 NOTE — TELEPHONE ENCOUNTER
Windy Houston called asking about information on how to get patient set up a nurse practitioner to come to the house. Physical therapist suggested Cira Regan, Dr Collins Kauffman? Explained she would need to contact her insurance to see if this is a covered service or if they have a preferred option. Carmelo Gaitan states she thought it was a referral from our office. Explained we can make referrals but its ultimately up to her insurance, so its better to call the insurance and call us back with the needed information if a referral is even needed.

## 2020-10-06 ENCOUNTER — INITIAL CONSULT (OUTPATIENT)
Dept: NEUROLOGY | Age: 85
End: 2020-10-06
Payer: MEDICARE

## 2020-10-06 VITALS
TEMPERATURE: 97.5 F | HEIGHT: 67 IN | SYSTOLIC BLOOD PRESSURE: 128 MMHG | DIASTOLIC BLOOD PRESSURE: 77 MMHG | WEIGHT: 126 LBS | BODY MASS INDEX: 19.78 KG/M2 | OXYGEN SATURATION: 94 % | HEART RATE: 67 BPM

## 2020-10-06 PROBLEM — I10 HTN (HYPERTENSION), BENIGN: Status: ACTIVE | Noted: 2020-10-06

## 2020-10-06 PROBLEM — G20.C PARKINSONIAN TREMOR: Status: ACTIVE | Noted: 2020-10-06

## 2020-10-06 PROBLEM — G20 PARKINSONIAN TREMOR (HCC): Status: ACTIVE | Noted: 2020-10-06

## 2020-10-06 PROCEDURE — 1123F ACP DISCUSS/DSCN MKR DOCD: CPT | Performed by: PSYCHIATRY & NEUROLOGY

## 2020-10-06 PROCEDURE — G8420 CALC BMI NORM PARAMETERS: HCPCS | Performed by: PSYCHIATRY & NEUROLOGY

## 2020-10-06 PROCEDURE — 1090F PRES/ABSN URINE INCON ASSESS: CPT | Performed by: PSYCHIATRY & NEUROLOGY

## 2020-10-06 PROCEDURE — G8484 FLU IMMUNIZE NO ADMIN: HCPCS | Performed by: PSYCHIATRY & NEUROLOGY

## 2020-10-06 PROCEDURE — 99204 OFFICE O/P NEW MOD 45 MIN: CPT | Performed by: PSYCHIATRY & NEUROLOGY

## 2020-10-06 PROCEDURE — 4040F PNEUMOC VAC/ADMIN/RCVD: CPT | Performed by: PSYCHIATRY & NEUROLOGY

## 2020-10-06 PROCEDURE — 1036F TOBACCO NON-USER: CPT | Performed by: PSYCHIATRY & NEUROLOGY

## 2020-10-06 PROCEDURE — G8427 DOCREV CUR MEDS BY ELIG CLIN: HCPCS | Performed by: PSYCHIATRY & NEUROLOGY

## 2020-10-06 PROCEDURE — G8399 PT W/DXA RESULTS DOCUMENT: HCPCS | Performed by: PSYCHIATRY & NEUROLOGY

## 2020-10-06 NOTE — PROGRESS NOTES
The patient is a 80y.o. years old female who  was referred by Siena Harris MD for consultation regarding new onset tremors and possible PD. The patient came today by herself. I do not feel she is a good historian. HPI:  The patient describes new onset hand tremors and possible Parkinson disease. Symptoms started years ago. She described episode of intermittent tremors in both hands more right than left. Degree is moderate and duration is minutes with waxing and waning features. Frequency is daily. Triggers could be stress, anxiety and more at rest.  It goes away when she holds her hand up or eat. So far she is able to function at home. She lives by herself but she is under constant care from her family. She does not get around much due to generalized weakness with arthritis. She gets around with the help of a walker and wheelchair outside the house. She denies any family history of tremors or Parkinson disease. She denies any recent falling or injury. She was admitted to the hospital recently for GI bleed. She is forgetful. She denies any hallucination, psychosis, suicidal ideation or thoughts. She denies any difficulties with speech, swallowing, frequent headaches, focal weakness. She does feel stiff and rigid when she walks. No bladder or bowel issues. History of hypertension on few medications. She was started on Sinemet  mg tablet at least twice daily. No improvement with such medication. No other new symptoms today. Other review of system was unremarkable.       Family History   Problem Relation Age of Onset    Other Mother         scleroderma    Heart Disease Father         MI    Heart Attack Son 43    Diabetes Son        Past Medical History:   Diagnosis Date    Anxiety 7/20/2012    Arthritis     Asthma     Carpal tunnel syndrome of left wrist 10/6/2011    Depression 5/2/2012    GERD (gastroesophageal reflux disease) 9/4/2013    Hematuria     chronic from stone    History of nonadherence to medical treatment 10/11/2014    Hypertension     Kidney stone     saw Dr Jerome - 6 cm stone    Thyroid disease     goiter    Tight ring on finger     unable to remove - left hand    Wears dentures     full upper    Wears glasses     for reading     Prior to Visit Medications    Medication Sig Taking? Authorizing Provider   carbidopa-levodopa (SINEMET)  MG per tablet Take 1 tablet by mouth 3 times daily Yes Fredrick Rodriguez MD   acetaminophen (TYLENOL) 500 MG tablet Take 500 mg by mouth daily Yes Historical Provider, MD   celecoxib (CELEBREX) 200 MG capsule Take 1 capsule by mouth daily Yes Diane Harris MD   amLODIPine-benazepril (LOTREL) 5-20 MG per capsule TAKE 1 CAPSULE BY MOUTH EVERY DAY Yes Priscilla Harris MD   Cholecalciferol (CVS D3) 48 MCG (2000 UT) CAPS TAKE ONE CAPSULE BY MOUTH DAILY Yes Diane Harris MD   albuterol sulfate  (90 Base) MCG/ACT inhaler Inhale 2 puffs into the lungs every 6 hours as needed for Wheezing Yes Diane Harris MD   SENNA CO Take 1 tablet by mouth as needed  Yes Historical Provider, MD   magnesium hydroxide (MILK OF MAGNESIA) 400 MG/5ML suspension Take 30 mLs by mouth daily as needed for Constipation Yes Diane Harris MD     Allergies   Allergen Reactions    Hydrochlorothiazide      Constipation and severe leg cramps     Social History     Tobacco Use    Smoking status: Never Smoker    Smokeless tobacco: Never Used   Substance Use Topics    Alcohol use: No     Past Surgical History:   Procedure Laterality Date    CARPAL TUNNEL RELEASE Right 09/14/2011    OPEN REDUCTION INTERNAL FIXATION RIGHT WRIST (RUSSO'S FRACTURE)    COLONOSCOPY N/A 7/1/2020    COLON (2:00) performed by Ji Rojas DO at [address] [place] [address], [facility] N/A 6/30/2020    FLEX SIG W/ANES.  performed by Ji Rojas DO at [facility] Right 3/10/2020    RIGHT TOTAL KNEE REPLACEMENT       KANG & NEPHEW performed by Parviz Claudio MD at [facility] 6/30/2020    EGD BIOPSY performed by Julio César Garcia DO at [address] [place] [address]  6/30/2020    EGD DILATION BALLOON performed by Julio César Garcia DO at [facility] : A 10-14 system review of constitutional, cardiovascular, respiratory, GI, eyes, , ENT, musculoskeletal, endocrine, skin, SHEENT, genitourinary, psychiatric and neurologic systems was obtained and updated today and is unremarkable except as mentioned in my HPI        Exam:   Constitutional:   Vitals:    10/06/20 1227   BP: 128/77   Pulse: 67   Temp: 97.5 °F (36.4 °C)   TempSrc: Infrared   SpO2: 94%   Weight: 126 lb (57.2 kg)   Height: 5' 7\" (1.702 m)       General appearance: No acute distress. Eye: No icterus. Fundus: Funduscopic examination cannot be performed due to COVID19 restrictions and precautions. Patient agreed. Neck: supple  Cardiovascular: No lower leg edema with good pulsation. Mental Status:   AAO x3 today  Poor immediate recall  Intact remote memory  Poor fund of knowledge  Language is intact with good fluency but poor vocabulary  Good attention today but easily distracted. Cranial Nerves:   II: Visual fields: Full to confrontation. Pupils: equal, round, reactive to light  III,IV,VI: Extra Ocular Movements are intact. No nystagmus  V: Facial sensation is intact  VII: Facial strength and movements: intact and symmetric  VIII: Hearing: Intact to finger rub bilaterally  IX: Palate elevation is symmetric  XI: Shoulder shrug is intact  XII: Tongue movements are normal  Musculoskeletal: Generalized diffuse weakness 4/5 with poor effort. Decreased range of motion. .   Tone: Normal tone. Reflexes: 1+ throughout both arms and legs. Planters: flexor bilaterally. Coordination: Poor REM and moderate bradykinesia in her left arm.   Resting pill-rolling tremors was seen in both hands more right than left. No postural or intentional tremors. Sensation: normal to all modalities in both arms and legs. Gait/Posture: Cannot be tested generalized weakness. Medical decision making:  I personally reviewed and updated social history, past medical history, medications, allergy, surgical history, and family history as documented in the patient's electronic health records. Labs and/or neuroimaging and other test results reviewed and discussed with the patient. Reviewed notes from other physicians. Provided patient education regarding risk, benefits and treatment options as well as adherence to medication regimen and side effect from these medications. Assessment:  1. Parkinson disease, symptomatic ([facility] New onset  2. Parkinsonian tremor ([facility]    New onset hand tremors with bradykinesia. Could be consistent with a new onset Parkinson disease. Change Sinemet to  mg tablet 3 times daily  Discussed side effect with the patient. Possibility of lightheadedness, dizziness and falling  Falling precautions  Continues in her walker  Avoid high-protein meals  Should be consistent with the timing of her Sinemet  ? Underlying cognitive impairment. The patient was advised to have family member with her in her next visit for further discussion regarding memory and safety    - carbidopa-levodopa (SINEMET)  MG per tablet; Take 1 tablet by mouth 3 times daily  Dispense: 90 tablet; Refill: 2        3. HTN (hypertension), benign  Continue current blood pressure medications  Monitor blood pressure at home       4. Malnutrition  The patient was advised to advance her diet and work with her dietitian      RTC in 2-month to address her parkinsonian tremors as well as further cognitive impairment and safety evaluation.

## 2020-10-06 NOTE — LETTER
Edward Do MD    [facility]  [address], [address] [place] [address]. [address] [place] [address]    [phone] (Phone)  [phone] (Fax)    Dear Dr Heydi Camacho MD    I had the pleasure of seeing your patient Isidro Garcia  1935 today. I have attached a detailed summary below:    The patient is a 80y.o. years old female who  was referred by Piedmont McDuffieYaz MD for consultation regarding new onset tremors and possible PD. The patient came today by herself. I do not feel she is a good historian. HPI:  The patient describes new onset hand tremors and possible Parkinson disease. Symptoms started years ago. She described episode of intermittent tremors in both hands more right than left. Degree is moderate and duration is minutes with waxing and waning features. Frequency is daily. Triggers could be stress, anxiety and more at rest.  It goes away when she holds her hand up or eat. So far she is able to function at home. She lives by herself but she is under constant care from her family. She does not get around much due to generalized weakness with arthritis. She gets around with the help of a walker and wheelchair outside the house. She denies any family history of tremors or Parkinson disease. She denies any recent falling or injury. She was admitted to the hospital recently for GI bleed. She is forgetful. She denies any hallucination, psychosis, suicidal ideation or thoughts. She denies any difficulties with speech, swallowing, frequent headaches, focal weakness. She does feel stiff and rigid when she walks. No bladder or bowel issues. History of hypertension on few medications. She was started on Sinemet  mg tablet at least twice daily. No improvement with such medication. No other new symptoms today. Other review of system was unremarkable.       Family History   Problem Relation Age of Onset  Other Mother         scleroderma    Heart Disease Father         MI    Heart Attack Son 43    Diabetes Son        Past Medical History:   Diagnosis Date    Anxiety 7/20/2012    Arthritis     Asthma     Carpal tunnel syndrome of left wrist 10/6/2011    Depression 5/2/2012    GERD (gastroesophageal reflux disease) 9/4/2013    Hematuria     chronic from stone    History of nonadherence to medical treatment 10/11/2014    Hypertension     Kidney stone     saw Dr Jerome - 6 cm stone    Thyroid disease     goiter    Tight ring on finger     unable to remove - left hand    Wears dentures     full upper    Wears glasses     for reading     Prior to Visit Medications    Medication Sig Taking?  Authorizing Provider   carbidopa-levodopa (SINEMET)  MG per tablet Take 1 tablet by mouth 3 times daily Yes Ismael Krishnamurthy MD   acetaminophen (TYLENOL) 500 MG tablet Take 500 mg by mouth daily Yes Historical Provider, MD   celecoxib (CELEBREX) 200 MG capsule Take 1 capsule by mouth daily Yes Diane Harris MD   amLODIPine-benazepril (LOTREL) 5-20 MG per capsule TAKE 1 CAPSULE BY MOUTH EVERY DAY Yes Bethany Harris MD   Cholecalciferol (CVS D3) 48 MCG (2000 UT) CAPS TAKE ONE CAPSULE BY MOUTH DAILY Yes Diane Harris MD   albuterol sulfate  (90 Base) MCG/ACT inhaler Inhale 2 puffs into the lungs every 6 hours as needed for Wheezing Yes Bethany Harris MD   SENNA CO Take 1 tablet by mouth as needed  Yes Historical Provider, MD   magnesium hydroxide (MILK OF MAGNESIA) 400 MG/5ML suspension Take 30 mLs by mouth daily as needed for Constipation Yes Diane Harris MD     Allergies   Allergen Reactions    Hydrochlorothiazide      Constipation and severe leg cramps     Social History     Tobacco Use    Smoking status: Never Smoker    Smokeless tobacco: Never Used   Substance Use Topics    Alcohol use: No     Past Surgical History:   Procedure Laterality Date    CARPAL TUNNEL RELEASE Right 09/14/2011 Monitor blood pressure at home       4. Malnutrition  The patient was advised to advance her diet and work with her dietitian      RTC in 2-month to address her parkinsonian tremors as well as further cognitive impairment and safety evaluation. Please do not hesitate to contact me, should you have any questions or concerns regarding the care of Ji Harrison     Sincerely,    Bri Umana MD    This dictation was generated by voice recognition computer software. Although all attempts are made to edit the dictation for accuracy, there may be errors in the transcription that are not intended.

## 2020-11-12 ENCOUNTER — TELEPHONE (OUTPATIENT)
Dept: INTERNAL MEDICINE CLINIC | Age: 85
End: 2020-11-12

## 2020-11-12 NOTE — TELEPHONE ENCOUNTER
Patient is seeking to move to a nursing facility for skilled stay, patient looking at [facility] if possible, patient would most likely need transportation. Incoming call from [address] [place] [address],[address]- states she submitted Medicare Forms to job & family, also son Mallory Mckeon is in process of getting POA. Please advise if there are steps that need to be taken I.e. referral for nursing home/facility?

## 2020-11-12 NOTE — TELEPHONE ENCOUNTER
I normally don't need to do a referral and nursing facility can contact us if anything else is needed

## 2020-11-17 ENCOUNTER — VIRTUAL VISIT (OUTPATIENT)
Dept: INTERNAL MEDICINE CLINIC | Age: 85
End: 2020-11-17
Payer: MEDICARE

## 2020-11-17 PROCEDURE — 99443 PR PHYS/QHP TELEPHONE EVALUATION 21-30 MIN: CPT | Performed by: INTERNAL MEDICINE

## 2020-11-17 RX ORDER — PANTOPRAZOLE SODIUM 40 MG/1
40 TABLET, DELAYED RELEASE ORAL
Qty: 90 TABLET | Refills: 2 | Status: SHIPPED | OUTPATIENT
Start: 2020-11-17 | End: 2021-08-25 | Stop reason: SDUPTHER

## 2020-11-17 RX ORDER — MELOXICAM 15 MG/1
15 TABLET ORAL DAILY
Qty: 90 TABLET | Refills: 0 | Status: SHIPPED | OUTPATIENT
Start: 2020-11-17 | End: 2021-02-09 | Stop reason: SDUPTHER

## 2020-11-17 NOTE — PROGRESS NOTES
Date of Service:  11/17/2020    Chief Complaint:      Chief Complaint   Patient presents with    Hypertension    Extremity Weakness    Arthritis       HPI:  Sukhjinder Knight is a 80 y.o. Suhkjinder Knight is a 80 y.o. female evaluated via telephone on 11/17/2020 due to [place] outbreak. Consent:  She and/or health care decision maker is aware that that she may receive a bill for this telephone service, depending on her insurance coverage, and has provided verbal consent to proceed: I AFFIRM/DO this is a Patient Initiated Episode with an Established Patient who has not had a related appointment within my department in the past 7 days or scheduled within the next 24 hours. She's suppose to go to Nursing Home once she gets Medicaid. Has an aid that comes once a week, meals on wheels daily. Family visits every 2 weeks but can't take her to doctor's appointments. Parkinson:  Stable on Sinemet 10/100 mg bid.      Hypertension:  Home blood pressure monitoring: Yes - 140/84 on Lotrel 5/20 mg qd since BP.  She is adherent to a low sodium diet. Patient denies chest pain, shortness of breath, headache, lightheadedness, blurred vision, peripheral edema, palpitations, dry cough and fatigue.  Antihypertensive medication side effects: no medication side effects noted.  Use of agents associated with hypertension: none.    Bilateral knee OA s/p Right TKR: not able to ambulate off Mobic 15 mg 1 qd and would like to restart since she didn't get the Celebrex. H/O rectal bleed:  Stable on Protonix 40 mg qd  Chronic Constipation:  stable on otc Senna and Milk of Mag. Colace didn't work.   B12 def: pt not taking B12 due to not notice an improvement in her energy        Her grandson Mushtaq García will be looking for a bigger place so pt can move in with him     Total Time: minutes: 21-30 minutes      Lab Results   Component Value Date    LABA1C 5.2 02/25/2020    LABMICR YES 06/27/2020     Lab Results   Component Value carbidopa-levodopa (SINEMET)  MG per tablet Take 1 tablet by mouth 3 times daily 90 tablet 2    acetaminophen (TYLENOL) 500 MG tablet Take 500 mg by mouth daily      celecoxib (CELEBREX) 200 MG capsule Take 1 capsule by mouth daily 90 capsule 1    amLODIPine-benazepril (LOTREL) 5-20 MG per capsule TAKE 1 CAPSULE BY MOUTH EVERY DAY 90 capsule 1    Cholecalciferol (CVS D3) 50 MCG (2000 UT) CAPS TAKE ONE CAPSULE BY MOUTH DAILY 90 capsule 3    albuterol sulfate  (90 Base) MCG/ACT inhaler Inhale 2 puffs into the lungs every 6 hours as needed for Wheezing 1 Inhaler 1    SENNA CO Take 1 tablet by mouth as needed       magnesium hydroxide (MILK OF MAGNESIA) 400 MG/5ML suspension Take 30 mLs by mouth daily as needed for Constipation 1 Bottle 0         Review of Systems: 14 systems were negative except of what was stated on HPI    Nursing note and vitals reviewed. There were no vitals filed for this visit. Wt Readings from Last 3 Encounters:   10/06/20 126 lb (57.2 kg)   09/18/20 126 lb (57.2 kg)   08/14/20 126 lb (57.2 kg)     BP Readings from Last 3 Encounters:   10/06/20 128/77   09/18/20 (!) 145/76   07/02/20 111/61     There is no height or weight on file to calculate BMI. Constitutional: Patient sounded well and in no distress. Psychiatric: Normal mood on the phone. Assessment/Plan:  Piero Heath was seen today for hypertension, extremity weakness and arthritis. Diagnoses and all orders for this visit:    Blood per rectum  restart pantoprazole (PROTONIX) 40 MG tablet; Take 1 tablet by mouth every morning (before breakfast)    Primary osteoarthritis of both knees  -     meloxicam (MOBIC) 15 MG tablet; Take 1 tablet by mouth daily    HTN (hypertension), benign    Other iron deficiency anemia  -     Hemoglobin and Hematocrit, Blood; Future        Return Feb 9 2:40 VV BP, arthritis,. ..

## 2021-02-09 ENCOUNTER — VIRTUAL VISIT (OUTPATIENT)
Dept: INTERNAL MEDICINE CLINIC | Age: 86
End: 2021-02-09
Payer: MEDICARE

## 2021-02-09 DIAGNOSIS — M17.0 PRIMARY OSTEOARTHRITIS OF BOTH KNEES: ICD-10-CM

## 2021-02-09 DIAGNOSIS — R53.1 WEAKNESS GENERALIZED: ICD-10-CM

## 2021-02-09 DIAGNOSIS — I10 ESSENTIAL HYPERTENSION: Primary | ICD-10-CM

## 2021-02-09 DIAGNOSIS — G20 PARKINSON DISEASE, SYMPTOMATIC (HCC): ICD-10-CM

## 2021-02-09 PROCEDURE — 99443 PR PHYS/QHP TELEPHONE EVALUATION 21-30 MIN: CPT | Performed by: INTERNAL MEDICINE

## 2021-02-09 RX ORDER — AMLODIPINE BESYLATE AND BENAZEPRIL HYDROCHLORIDE 5; 20 MG/1; MG/1
CAPSULE ORAL
Qty: 90 CAPSULE | Refills: 1 | Status: SHIPPED | OUTPATIENT
Start: 2021-02-09 | End: 2021-08-25 | Stop reason: SDUPTHER

## 2021-02-09 RX ORDER — MELOXICAM 15 MG/1
15 TABLET ORAL DAILY
Qty: 90 TABLET | Refills: 1 | Status: SHIPPED | OUTPATIENT
Start: 2021-02-09 | End: 2021-08-25 | Stop reason: SDUPTHER

## 2021-02-09 NOTE — PROGRESS NOTES
Date of Service:  2/9/2021    Chief Complaint:      Chief Complaint   Patient presents with    Arthritis       HPI:  Jesica Robins is a 80 y.o. Jesica Robins is a 80 y.o. female evaluated via telephone on 2/9/2021 due to [place] outbreak. Consent:  She and/or health care decision maker is aware that that she may receive a bill for this telephone service, depending on her insurance coverage, and has provided verbal consent to proceed: I AFFIRM/DO this is a Patient Initiated Episode with an Established Patient who has not had a related appointment within my department in the past 7 days or scheduled within the next 24 hours. She's suppose to go to Nursing Home but she doesn't want to go. Has an aid that comes once a week, meals on wheels once a week. Family visits couple of times a week.     Parkinson:  Stable on Sinemet 10/100 mg bid.      Hypertension:  Home blood pressure monitoring: Yes - 140/84 on Lotrel 5/20 mg qd since BP.  She is adherent to a low sodium diet. Patient denies chest pain, shortness of breath, headache, lightheadedness, blurred vision, peripheral edema, palpitations, dry cough and fatigue.  Antihypertensive medication side effects: no medication side effects noted.  Use of agents associated with hypertension: none.    Bilateral knee OA s/p Right TKR: Stable on Mobic 15 mg qd since Celebrex is too expensive. H/O rectal bleed:  Stable on Protonix 40 mg qd  Chronic Constipation:  stable on otc Senna and Milk of Mag. Colace didn't work.   B12 def: pt not taking B12 due to not notice an improvement in her energy     Total Time: minutes: 21-30 minutes      Lab Results   Component Value Date    LABA1C 5.2 02/25/2020    LABMICR YES 06/27/2020     Lab Results   Component Value Date     07/02/2020    K 3.6 07/02/2020     07/02/2020    CO2 25 07/02/2020    BUN 9 07/02/2020    CREATININE 0.6 07/02/2020    GLUCOSE 94 07/02/2020    CALCIUM 7.9 (L) 07/02/2020     Lab Results   Component Value Date    CHOL 150 01/20/2018    TRIG 56 01/20/2018    HDL 47 01/20/2018    LDLCALC 92 01/20/2018     Lab Results   Component Value Date    ALT 9 (L) 06/27/2020    AST 13 (L) 06/27/2020     Lab Results   Component Value Date    TSH 0.57 05/19/2015    TSH 1.18 10/06/2011    T4FREE 1.05 10/06/2011    T4FREE 1.05 10/06/2011     Lab Results   Component Value Date    WBC 5.0 07/02/2020    HGB 7.6 (L) 07/02/2020    HCT 23.5 (L) 07/02/2020    MCV 90.9 07/02/2020     (L) 07/02/2020     Lab Results   Component Value Date    INR 1.04 06/27/2020    INR 0.98 02/25/2020      No results found for: PSA   No results found for: [facility]     Patient Active Problem List   Diagnosis    Carpal tunnel syndrome of left wrist    Mild intermittent asthma    Goiter    Vitamin D deficiency    Staghorn calculus    Biceps rupture, proximal    Incomplete tear of right rotator cuff    History of nonadherence to medical treatment    B12 deficiency    Primary osteoarthritis of both knees    Irritable bowel syndrome with constipation    Environmental and seasonal allergies    Weakness generalized    Status post total right knee replacement    S/P total knee arthroplasty, right    Moderate malnutrition ([address]    Blood per rectum    BRBPR (bright red blood per rectum)    Acute blood loss anemia    Parkinson disease, symptomatic (HCC)    NSAID long-term use    Acute pain of right shoulder    Parkinsonian tremor (HCC)    HTN (hypertension), benign       Allergies   Allergen Reactions    Hydrochlorothiazide      Constipation and severe leg cramps     Outpatient Medications Marked as Taking for the 2/9/21 encounter (Virtual Visit) with Nimesh Harris MD   Medication Sig Dispense Refill    meloxicam (MOBIC) 15 MG tablet Take 1 tablet by mouth daily 90 tablet 0    pantoprazole (PROTONIX) 40 MG tablet Take 1 tablet by mouth every morning (before breakfast) 90 tablet 2    carbidopa-levodopa (SINEMET)  MG per tablet Take 1 tablet by mouth 3 times daily 90 tablet 2    acetaminophen (TYLENOL) 500 MG tablet Take 500 mg by mouth daily      celecoxib (CELEBREX) 200 MG capsule Take 1 capsule by mouth daily 90 capsule 1    amLODIPine-benazepril (LOTREL) 5-20 MG per capsule TAKE 1 CAPSULE BY MOUTH EVERY DAY 90 capsule 1    Cholecalciferol (CVS D3) 50 MCG (2000 UT) CAPS TAKE ONE CAPSULE BY MOUTH DAILY 90 capsule 3    albuterol sulfate  (90 Base) MCG/ACT inhaler Inhale 2 puffs into the lungs every 6 hours as needed for Wheezing 1 Inhaler 1    SENNA CO Take 1 tablet by mouth as needed       magnesium hydroxide (MILK OF MAGNESIA) 400 MG/5ML suspension Take 30 mLs by mouth daily as needed for Constipation 1 Bottle 0         Review of Systems: 14 systems were negative except of what was stated on HPI    Nursing note and vitals reviewed. There were no vitals filed for this visit. Wt Readings from Last 3 Encounters:   10/06/20 126 lb (57.2 kg)   09/18/20 126 lb (57.2 kg)   08/14/20 126 lb (57.2 kg)     BP Readings from Last 3 Encounters:   10/06/20 128/77   09/18/20 (!) 145/76   07/02/20 111/61     There is no height or weight on file to calculate BMI. Constitutional: Patient sounded well and in no distress. Psychiatric: Normal mood on the phone. Assessment/Plan:  Anna Mendenhall was seen today for arthritis. Diagnoses and all orders for this visit:    Essential hypertension  -     amLODIPine-benazepril (LOTREL) 5-20 MG per capsule; TAKE 1 CAPSULE BY MOUTH EVERY DAY    Primary osteoarthritis of both knees  -     meloxicam (MOBIC) 15 MG tablet; Take 1 tablet by mouth daily    Weakness generalized    Parkinson disease, symptomatic ([address]    Can't set up Homehealth since they require an video or in office visit within 90 days and pt doesn't have a smart phone or transportation to the office. Pt will try to get a hold of family members to help.       Return June 7 at 1 VV Telephone [employer] and f/u.

## 2021-02-19 ENCOUNTER — TELEPHONE (OUTPATIENT)
Dept: ADMINISTRATIVE | Age: 86
End: 2021-02-19

## 2021-02-19 NOTE — TELEPHONE ENCOUNTER
ECC received a call from: [facility]    Name of Caller: Natalia Haywood 15    Relationship to patient: Other    Organization name: [employer] contact number: [phone]    Reason for call: Nursing home called requesting for the office to fax over the patient's last recent appointment summary with medication list. Patient is trying to move to nursing home.  Fax # [phone]

## 2021-03-22 ENCOUNTER — TELEPHONE (OUTPATIENT)
Dept: INTERNAL MEDICINE CLINIC | Age: 86
End: 2021-03-22

## 2021-03-22 NOTE — TELEPHONE ENCOUNTER
Mary Blackburn calling for verbal that patients grandson will be providing home health personal care through [facility].

## 2021-04-06 ENCOUNTER — TELEPHONE (OUTPATIENT)
Dept: INTERNAL MEDICINE CLINIC | Age: 86
End: 2021-04-06

## 2021-04-06 NOTE — TELEPHONE ENCOUNTER
[facility] with [facility], called requesting verbal for continued PT and to treat a skin tear on R hand from fall between bed and wall in middle of night. She also expresses concerns for patient being alone. Charlene Chappell is only available for ~4 hours. Leaves patients dinner by chair at 2pm.     Informed lexie mclain is out of office for the week and patient has virtual follow up scheduled for Monday 04/12/21 at 3:40p. [facility] requesting verbal prior to then. Asked lexie to send us the fax so we can route also to Andreia Kiran to see if she will give verbal in mean time, who is covering for Dr mclain. Please advise.

## 2021-04-07 NOTE — TELEPHONE ENCOUNTER
Spoke with Suzy Cantu at [facility] home office and advised ok for continued PT and to treat skin tear on patients right hand from a fall.

## 2021-04-07 NOTE — TELEPHONE ENCOUNTER
I think this is appropriate given the circumstance and patient condition.  Okay to give verbal. Jane Kahn

## 2021-04-15 ENCOUNTER — TELEPHONE (OUTPATIENT)
Dept: INTERNAL MEDICINE CLINIC | Age: 86
End: 2021-04-15

## 2021-04-15 NOTE — TELEPHONE ENCOUNTER
----- Message from Reta Cain sent at 4/15/2021 10:43 AM EDT -----  Subject: Appointment Request    Reason for Call: Routine Hospital Follow Up    QUESTIONS  Type of Appointment? Established Patient  Reason for appointment request? Available appointments did not meet   patient need  Additional Information for Provider? pt had to cancel today due to lack of   transportation. pt would like an in office appt. but none are available   through 6/30. Please contact pt. for scheduling.   ---------------------------------------------------------------------------  --------------  CALL BACK INFO  What is the best way for the office to contact you? OK to leave message on   voicemail  Preferred Call Back Phone Number? [phone]  ---------------------------------------------------------------------------  --------------  SCRIPT ANSWERS  Relationship to Patient? Other  Representative Name? Chastity Lund  Additional information verified (besides Name and Date of Birth)? Address  Have your symptoms changed? No  Have you been diagnosed with   tested for   or told that you are suspected of having COVID-19 (Coronavirus)? No  Have you had a fever or taken medication to treat a fever within the past   3 days? No  Have you had a cough   shortness of breath or flu-like symptoms within the past 3 days? No  Do you currently have flu-like symptoms including fever or chills   cough   shortness of breath   or difficulty breathing   or new loss of taste or smell? No  (Service Expert  click yes below to proceed with Immunovative Therapies As Usual   Scheduling)?  Yes

## 2021-04-21 DIAGNOSIS — G20 PARKINSON DISEASE, SYMPTOMATIC (HCC): ICD-10-CM

## 2021-04-21 DIAGNOSIS — G20 PARKINSON DISEASE (HCC): ICD-10-CM

## 2021-04-21 NOTE — TELEPHONE ENCOUNTER
Requested Prescriptions     Pending Prescriptions Disp Refills    carbidopa-levodopa (SINEMET)  MG per tablet [Pharmacy Med Name: CARBIDOPA-LEVODOPA  TAB] 180 tablet 0     Sig: TAKE 1 TABLET BY MOUTH TWICE A DAY         LAST REFILL 10/6/2020 by Nader Arauz MD  QUANTITY  90         REFILLS  2  LAST VISIT  10/6/2020  NEXT VISIT  N/A

## 2021-04-21 NOTE — TELEPHONE ENCOUNTER
Patient needs to schedule a follow up appointment. Last seen 10.06.20    Last office note states to RTO 2 months/12.2020    Next appointment scheduled for none.

## 2021-04-21 NOTE — TELEPHONE ENCOUNTER
Doris Barraza (pt's grandson) & pt called to make an appt & get a refill on pt's Carbidopa-levodopa. ([facility] [address] Last appt was 10/6/20. Next appt is 7/22/21. Please advise. Thank you.

## 2021-04-27 ENCOUNTER — TELEPHONE (OUTPATIENT)
Dept: INTERNAL MEDICINE CLINIC | Age: 86
End: 2021-04-27

## 2021-04-27 NOTE — TELEPHONE ENCOUNTER
Myron Jett RN, called requesting refill for patients carbidopa-levodopa (SINEMET)  MG per tablet . States patient missed appointment with neurology due to transportation not picking her up last month. And patient cannot get back in to see neurologist until July. So requesting dr mclain take care of refills. Informed Kallie, per patients chart- SINEMET was already sent in 04/22/21 by her Neurologist dr Ambrose Liang 30 day with 2 refills. Rosa Valentin will inform patient and azeb Estrada. Advised if pharmacy denies receiving the prescription, then to follow up with neurology to call and handle.

## 2021-04-29 ENCOUNTER — OFFICE VISIT (OUTPATIENT)
Dept: INTERNAL MEDICINE CLINIC | Age: 86
End: 2021-04-29
Payer: MEDICARE

## 2021-04-29 VITALS
BODY MASS INDEX: 20.52 KG/M2 | WEIGHT: 131 LBS | HEART RATE: 69 BPM | TEMPERATURE: 97.3 F | SYSTOLIC BLOOD PRESSURE: 108 MMHG | OXYGEN SATURATION: 96 % | DIASTOLIC BLOOD PRESSURE: 78 MMHG

## 2021-04-29 DIAGNOSIS — H61.22 HEARING LOSS OF LEFT EAR DUE TO CERUMEN IMPACTION: Primary | ICD-10-CM

## 2021-04-29 DIAGNOSIS — M17.0 PRIMARY OSTEOARTHRITIS OF BOTH KNEES: ICD-10-CM

## 2021-04-29 DIAGNOSIS — G20 PARKINSONIAN TREMOR (HCC): ICD-10-CM

## 2021-04-29 DIAGNOSIS — R53.1 WEAKNESS GENERALIZED: ICD-10-CM

## 2021-04-29 DIAGNOSIS — D50.8 OTHER IRON DEFICIENCY ANEMIA: ICD-10-CM

## 2021-04-29 DIAGNOSIS — I10 HTN (HYPERTENSION), BENIGN: ICD-10-CM

## 2021-04-29 DIAGNOSIS — Z79.1 NSAID LONG-TERM USE: ICD-10-CM

## 2021-04-29 PROBLEM — E44.0 MODERATE MALNUTRITION (HCC): Chronic | Status: RESOLVED | Noted: 2020-03-11 | Resolved: 2021-04-29

## 2021-04-29 PROBLEM — D50.9 IRON DEFICIENCY ANEMIA: Status: ACTIVE | Noted: 2021-04-29

## 2021-04-29 PROCEDURE — 1123F ACP DISCUSS/DSCN MKR DOCD: CPT | Performed by: INTERNAL MEDICINE

## 2021-04-29 PROCEDURE — 99214 OFFICE O/P EST MOD 30 MIN: CPT | Performed by: INTERNAL MEDICINE

## 2021-04-29 PROCEDURE — 1036F TOBACCO NON-USER: CPT | Performed by: INTERNAL MEDICINE

## 2021-04-29 PROCEDURE — 69209 REMOVE IMPACTED EAR WAX UNI: CPT | Performed by: INTERNAL MEDICINE

## 2021-04-29 PROCEDURE — 1090F PRES/ABSN URINE INCON ASSESS: CPT | Performed by: INTERNAL MEDICINE

## 2021-04-29 PROCEDURE — G8420 CALC BMI NORM PARAMETERS: HCPCS | Performed by: INTERNAL MEDICINE

## 2021-04-29 PROCEDURE — G8427 DOCREV CUR MEDS BY ELIG CLIN: HCPCS | Performed by: INTERNAL MEDICINE

## 2021-04-29 PROCEDURE — 4040F PNEUMOC VAC/ADMIN/RCVD: CPT | Performed by: INTERNAL MEDICINE

## 2021-04-29 ASSESSMENT — PATIENT HEALTH QUESTIONNAIRE - PHQ9
SUM OF ALL RESPONSES TO PHQ QUESTIONS 1-9: 0
SUM OF ALL RESPONSES TO PHQ9 QUESTIONS 1 & 2: 0
SUM OF ALL RESPONSES TO PHQ QUESTIONS 1-9: 0

## 2021-04-29 NOTE — PROGRESS NOTES
Omega Lindsey  YOB: 1935    Date of Service:  4/29/2021    Chief Complaint:      Chief Complaint   Patient presents with    Tremors       HPI:  Omega Lindsey is a 80 y.o. She complains of her left ear being clogged and can't hear real well. She just got home from Nursing home 3 weeks ago and now her grandson works for [employer] that's assigned to take care of her 6 days a week. She does have PT coming twice a week. She gets meals on wheels once a week. Parkinson:  Stable on Sinemet 25/100 mg bid.      Hypertension:  Home blood pressure monitoring: Yes - 130/70 on Lotrel 5/20 mg qd since BP.  She is adherent to a low sodium diet. Patient denies chest pain, shortness of breath, headache, lightheadedness, blurred vision, peripheral edema, palpitations, dry cough and fatigue.  Antihypertensive medication side effects: no medication side effects noted.  Use of agents associated with hypertension: none.    Bilateral knee OA s/p Right TKR: Stable on Mobic 15 mg qd since Celebrex is too expensive. H/O rectal bleed:  Stable on Protonix 40 mg qd  Chronic Constipation:  stable on otc Senna and Milk of Mag. Colace didn't work. B12 def: pt not taking B12 due to not notice an improvement in her energy  Anemia:  Still on Iron qod and las Hg 10 at Nursing home 3 weeks ago per pt.     Lab Results   Component Value Date    LABA1C 5.2 02/25/2020    LABMICR YES 06/27/2020     Lab Results   Component Value Date     07/02/2020    K 3.6 07/02/2020     07/02/2020    CO2 25 07/02/2020    BUN 9 07/02/2020    CREATININE 0.6 07/02/2020    GLUCOSE 94 07/02/2020    CALCIUM 7.9 (L) 07/02/2020     Lab Results   Component Value Date    CHOL 150 01/20/2018    TRIG 56 01/20/2018    HDL 47 01/20/2018    LDLCALC 92 01/20/2018     Lab Results   Component Value Date    ALT 9 (L) 06/27/2020    AST 13 (L) 06/27/2020     Lab Results   Component Value Date    TSH 0.57 05/19/2015    TSH 1.18 10/06/2011 T4FREE 1.05 10/06/2011    T4FREE 1.05 10/06/2011     Lab Results   Component Value Date    WBC 5.0 07/02/2020    HGB 7.6 (L) 07/02/2020    HCT 23.5 (L) 07/02/2020    MCV 90.9 07/02/2020     (L) 07/02/2020     Lab Results   Component Value Date    INR 1.04 06/27/2020    INR 0.98 02/25/2020      No results found for: PSA   No results found for: [facility]     Patient Active Problem List   Diagnosis    Carpal tunnel syndrome of left wrist    Mild intermittent asthma    Goiter    Vitamin D deficiency    Staghorn calculus    Biceps rupture, proximal    Incomplete tear of right rotator cuff    History of nonadherence to medical treatment    B12 deficiency    Primary osteoarthritis of both knees    Irritable bowel syndrome with constipation    Environmental and seasonal allergies    Weakness generalized    Status post total right knee replacement    S/P total knee arthroplasty, right    Moderate malnutrition (HCC)    Blood per rectum    BRBPR (bright red blood per rectum)    Acute blood loss anemia    Parkinson disease, symptomatic (HCC)    NSAID long-term use    Acute pain of right shoulder    Parkinsonian tremor (HCC)    HTN (hypertension), benign       Allergies   Allergen Reactions    Hydrochlorothiazide      Constipation and severe leg cramps     Outpatient Medications Marked as Taking for the 4/29/21 encounter (Office Visit) with Soham Harris MD   Medication Sig Dispense Refill    carbidopa-levodopa (SINEMET)  MG per tablet Take 1 tablet by mouth 3 times daily 90 tablet 2    amLODIPine-benazepril (LOTREL) 5-20 MG per capsule TAKE 1 CAPSULE BY MOUTH EVERY DAY 90 capsule 1    meloxicam (MOBIC) 15 MG tablet Take 1 tablet by mouth daily 90 tablet 1    pantoprazole (PROTONIX) 40 MG tablet Take 1 tablet by mouth every morning (before breakfast) 90 tablet 2    acetaminophen (TYLENOL) 500 MG tablet Take 500 mg by mouth daily      Cholecalciferol ([employer] D3) 50 MCG (2000 UT) CAPS TAKE ONE CAPSULE BY MOUTH DAILY 90 capsule 3    albuterol sulfate  (90 Base) MCG/ACT inhaler Inhale 2 puffs into the lungs every 6 hours as needed for Wheezing 1 Inhaler 1    SENNA CO Take 1 tablet by mouth as needed       magnesium hydroxide (MILK OF MAGNESIA) 400 MG/5ML suspension Take 30 mLs by mouth daily as needed for Constipation 1 Bottle 0         Review of Systems: 14 systems were negative except of what was stated on HPI    Nursing note and vitals reviewed. Vitals:    04/29/21 1139   BP: 108/78   Pulse: 69   Temp: 97.3 °F (36.3 °C)   SpO2: 96%   Weight: 131 lb (59.4 kg)     Wt Readings from Last 3 Encounters:   04/29/21 131 lb (59.4 kg)   10/06/20 126 lb (57.2 kg)   09/18/20 126 lb (57.2 kg)     BP Readings from Last 3 Encounters:   04/29/21 108/78   10/06/20 128/77   09/18/20 (!) 145/76     Body mass index is 20.52 kg/m². Constitutional: Patient appears well-developed and well-nourished. No distress. Head: Normocephalic and atraumatic. Neck: Normal range of motion. Neck supple. No thyroidmegaly. Cardiovascular: Normal rate, regular rhythm, normal heart sounds and intact distal pulses. Pulmonary/Chest: Effort normal and breath sounds normal. No stridor. No respiratory distress. No wheezes and no rales. Abdominal: Soft. Bowel sounds are normal. No distension and no mass. No tenderness. No rebound and no guarding. Musculoskeletal: No edema and no tenderness. Skin: No rash or erythema. Psychiatric: Normal mood and affect. Behavior is normal.   Left ear cerumen impaction. Generalize weakness in arms and legs, sitting in wheelchair. Assessment/Plan:  Jody Reyes was seen today for tremors.     Diagnoses and all orders for this visit:    Hearing loss of left ear due to cerumen impaction  -     CT REMOVAL IMPACTED CERUMEN IRRIGATION/LVG UNILAT    HTN (hypertension), benign    Weakness generalized    Primary osteoarthritis of both knees    NSAID long-term use    Parkinsonian tremor

## 2021-05-03 ENCOUNTER — TELEPHONE (OUTPATIENT)
Dept: INTERNAL MEDICINE CLINIC | Age: 86
End: 2021-05-03

## 2021-05-03 DIAGNOSIS — M17.0 PRIMARY OSTEOARTHRITIS OF BOTH KNEES: ICD-10-CM

## 2021-05-03 DIAGNOSIS — R29.898 WEAKNESS OF EXTREMITY: ICD-10-CM

## 2021-05-03 DIAGNOSIS — R53.1 WEAKNESS GENERALIZED: Primary | ICD-10-CM

## 2021-05-03 DIAGNOSIS — G20 PARKINSONIAN TREMOR (HCC): ICD-10-CM

## 2021-05-03 NOTE — TELEPHONE ENCOUNTER
Received fax from [facility] and [facility] needing order for a hospital bed for patient. Orders pended. Needs faxed with face to face notes to [phone] and [phone].

## 2021-05-04 NOTE — TELEPHONE ENCOUNTER
Please see attached message from [facility] requiring signed & dated notes as well as completed order form. I have placed on provider's desk.

## 2021-05-06 ENCOUNTER — TELEPHONE (OUTPATIENT)
Dept: INTERNAL MEDICINE CLINIC | Age: 86
End: 2021-05-06

## 2021-05-06 DIAGNOSIS — R53.1 WEAKNESS GENERALIZED: Primary | ICD-10-CM

## 2021-05-06 NOTE — TELEPHONE ENCOUNTER
It's probably painful due to recent ear irrigation since I didn't see signs of infection when pt was in the office

## 2021-05-06 NOTE — TELEPHONE ENCOUNTER
Ameena Hughes, arcelia bowie, returning dr mclain voicemail regarding lift chair and hospital bed.        Callback at [phone]

## 2021-05-06 NOTE — TELEPHONE ENCOUNTER
Imani Mclean called in states that patient complains of left ear pain, she has requested ear drops or something to treat what she \"feels like ear infection\"    Please advise.

## 2021-05-12 ENCOUNTER — OFFICE VISIT (OUTPATIENT)
Dept: INTERNAL MEDICINE CLINIC | Age: 86
End: 2021-05-12
Payer: COMMERCIAL

## 2021-05-12 VITALS
WEIGHT: 132 LBS | SYSTOLIC BLOOD PRESSURE: 112 MMHG | DIASTOLIC BLOOD PRESSURE: 76 MMHG | BODY MASS INDEX: 20.67 KG/M2 | TEMPERATURE: 97.9 F

## 2021-05-12 DIAGNOSIS — H61.21 HEARING LOSS DUE TO CERUMEN IMPACTION, RIGHT: Primary | ICD-10-CM

## 2021-05-12 DIAGNOSIS — Z91.81 AT HIGH RISK FOR FALLS: ICD-10-CM

## 2021-05-12 PROCEDURE — 1090F PRES/ABSN URINE INCON ASSESS: CPT | Performed by: INTERNAL MEDICINE

## 2021-05-12 PROCEDURE — G8420 CALC BMI NORM PARAMETERS: HCPCS | Performed by: INTERNAL MEDICINE

## 2021-05-12 PROCEDURE — 69209 REMOVE IMPACTED EAR WAX UNI: CPT | Performed by: INTERNAL MEDICINE

## 2021-05-12 PROCEDURE — G8427 DOCREV CUR MEDS BY ELIG CLIN: HCPCS | Performed by: INTERNAL MEDICINE

## 2021-05-12 PROCEDURE — 4040F PNEUMOC VAC/ADMIN/RCVD: CPT | Performed by: INTERNAL MEDICINE

## 2021-05-12 PROCEDURE — 1036F TOBACCO NON-USER: CPT | Performed by: INTERNAL MEDICINE

## 2021-05-12 PROCEDURE — 99213 OFFICE O/P EST LOW 20 MIN: CPT | Performed by: INTERNAL MEDICINE

## 2021-05-12 PROCEDURE — 1123F ACP DISCUSS/DSCN MKR DOCD: CPT | Performed by: INTERNAL MEDICINE

## 2021-05-12 NOTE — PROGRESS NOTES
Dalia Beyer  YOB: 1935    Date of Service:  5/12/2021    Chief Complaint:      Chief Complaint   Patient presents with    Otalgia     Right ear x 4 days ago       HPI:  Dalia Beyer is a 80 y.o. She complains of not able to hear from right ear. No pain except for intermittent discomfort. No fever, chills, cough or URI symptoms. She needs hospital bed for getting in and out of bed  She also needs a chair lift to get in and out of a chair since her legs are weak. She has bad arthritis in left knee. She's able to walk with walker when her grandson is with her and even from parking lot today to our office.     Lab Results   Component Value Date    LABA1C 5.2 02/25/2020    LABMICR YES 06/27/2020     Lab Results   Component Value Date     07/02/2020    K 3.6 07/02/2020     07/02/2020    CO2 25 07/02/2020    BUN 9 07/02/2020    CREATININE 0.6 07/02/2020    GLUCOSE 94 07/02/2020    CALCIUM 7.9 (L) 07/02/2020     Lab Results   Component Value Date    CHOL 150 01/20/2018    TRIG 56 01/20/2018    HDL 47 01/20/2018    LDLCALC 92 01/20/2018     Lab Results   Component Value Date    ALT 9 (L) 06/27/2020    AST 13 (L) 06/27/2020     Lab Results   Component Value Date    TSH 0.57 05/19/2015    TSH 1.18 10/06/2011    T4FREE 1.05 10/06/2011    T4FREE 1.05 10/06/2011     Lab Results   Component Value Date    WBC 5.0 07/02/2020    HGB 7.6 (L) 07/02/2020    HCT 23.5 (L) 07/02/2020    MCV 90.9 07/02/2020     (L) 07/02/2020     Lab Results   Component Value Date    INR 1.04 06/27/2020    INR 0.98 02/25/2020      No results found for: PSA   No results found for: [facility]     Patient Active Problem List   Diagnosis    Carpal tunnel syndrome of left wrist    Mild intermittent asthma    Goiter    Vitamin D deficiency    Staghorn calculus    Biceps rupture, proximal    Incomplete tear of right rotator cuff    History of nonadherence to medical treatment    B12 deficiency    Primary (59.9 kg)     Wt Readings from Last 3 Encounters:   05/12/21 132 lb (59.9 kg)   04/29/21 131 lb (59.4 kg)   10/06/20 126 lb (57.2 kg)     BP Readings from Last 3 Encounters:   05/12/21 112/76   04/29/21 108/78   10/06/20 128/77     Body mass index is 20.67 kg/m². Constitutional: Patient appears well-developed and well-nourished. No distress. Head: Normocephalic and atraumatic. Neck: Normal range of motion. Neck supple. No thyroidmegaly. Cardiovascular: Normal rate, regular rhythm, normal heart sounds and intact distal pulses. Pulmonary/Chest: Effort normal and breath sounds normal. No stridor. No respiratory distress. No wheezes and no rales. Abdominal: Soft. Bowel sounds are normal. No distension and no mass. No tenderness. No rebound and no guarding. Musculoskeletal: No edema and no tenderness. Skin: No rash or erythema. Psychiatric: Normal mood and affect. Behavior is normal.     Assessment/Plan:  Mee Mota was seen today for otalgia. Diagnoses and all orders for this visit:    Hearing loss due to cerumen impaction, right  -     SC REMOVAL IMPACTED CERUMEN IRRIGATION/LVG UNILAT  -     [address] and Throat    At high risk for falls    she mainly use wheelchair    Return if symptoms worsen or fail to improve. On the basis of positive falls risk screening, assessment and plan is as follows: Moustapha Mejia

## 2021-05-13 ENCOUNTER — TELEPHONE (OUTPATIENT)
Dept: INTERNAL MEDICINE CLINIC | Age: 86
End: 2021-05-13

## 2021-05-18 ENCOUNTER — TELEPHONE (OUTPATIENT)
Dept: INTERNAL MEDICINE CLINIC | Age: 86
End: 2021-05-18

## 2021-05-18 NOTE — TELEPHONE ENCOUNTER
Bin Sarmiento called to check on status of lift chair and current medication list. She will assist with getting the cost of chair covered. Informed prescription sent 05/06/21 to [facility] as requested.    Printed a copy, as well as physician signature and med list.     Faxed to shanell @ [phone]

## 2021-05-19 DIAGNOSIS — G20 PARKINSON DISEASE, SYMPTOMATIC (HCC): ICD-10-CM

## 2021-05-19 NOTE — TELEPHONE ENCOUNTER
# 90  + 2 refill(s) was escribed on 04.22.21; therefore, no refill is needed until 07.22.21 and patient has a follow up appointment scheduled for 07.22.21.

## 2021-06-01 ENCOUNTER — OFFICE VISIT (OUTPATIENT)
Dept: ENT CLINIC | Age: 86
End: 2021-06-01
Payer: COMMERCIAL

## 2021-06-01 ENCOUNTER — PROCEDURE VISIT (OUTPATIENT)
Dept: AUDIOLOGY | Age: 86
End: 2021-06-01
Payer: COMMERCIAL

## 2021-06-01 VITALS
BODY MASS INDEX: 20.93 KG/M2 | DIASTOLIC BLOOD PRESSURE: 60 MMHG | SYSTOLIC BLOOD PRESSURE: 98 MMHG | WEIGHT: 130.2 LBS | HEART RATE: 64 BPM | HEIGHT: 66 IN

## 2021-06-01 DIAGNOSIS — H61.23 BILATERAL IMPACTED CERUMEN: ICD-10-CM

## 2021-06-01 DIAGNOSIS — H91.90 HEARING LOSS, UNSPECIFIED HEARING LOSS TYPE, UNSPECIFIED LATERALITY: Primary | ICD-10-CM

## 2021-06-01 DIAGNOSIS — H90.3 SENSORINEURAL HEARING LOSS, BILATERAL: Primary | ICD-10-CM

## 2021-06-01 PROCEDURE — 92557 COMPREHENSIVE HEARING TEST: CPT | Performed by: AUDIOLOGIST

## 2021-06-01 PROCEDURE — 92567 TYMPANOMETRY: CPT | Performed by: AUDIOLOGIST

## 2021-06-01 PROCEDURE — 69210 REMOVE IMPACTED EAR WAX UNI: CPT | Performed by: OTOLARYNGOLOGY

## 2021-06-01 PROCEDURE — 99203 OFFICE O/P NEW LOW 30 MIN: CPT | Performed by: OTOLARYNGOLOGY

## 2021-06-01 NOTE — Clinical Note
Dr. Duckworth Fairly,    Thank you for your referral for audiometric testing on this patient. Please see the scanned audiogram (under \"Media\" tab) and encounter note for details. If you have any questions, or if there is anything else you need, please let me know.       Kim Honeycutt  Audiologist  ---  [facility] - Audiology

## 2021-06-01 NOTE — PROGRESS NOTES
Sophia Pereira   1935, 80 y.o. female   [id number]       Referring Provider: Perez Jansen MD  Referral Type: In an order in [address] [place] [address]    Reason for Visit: Evaluation of suspected change in hearing    ADULT AUDIOLOGIC EVALUATION      Sophia Pereira is a 80 y.o. female seen today, 6/1/2021 , for an initial audiologic evaluation. Patient was seen by Perez Jansen MD following today's evaluation. Patient was accompanied by her son. AUDIOLOGIC AND OTHER PERTINENT MEDICAL HISTORY:      Sophia Pereira noted a perceived gradual decline in hearing, bilaterally. She also notes \"popping\" in the right ear when she swallows for the past couple of days. Notably, patient has used walker since recent knee replacement. Medical history is reportedly significant for gastrointestinal  bleed Summer 2020. No additional significant otologic or medical history was reported. Sophia Pereira denied otalgia, aural fullness, otorrhea, tinnitus, dizziness, history of falls, history of occupational/recreational noise exposure, history of head trauma, history of ear surgery and family history of hearing loss. Date: 6/1/2021     IMPRESSIONS:      Today's results revealed a symmetric sensorineural hearing loss with excellent word recognition, bilaterally. Hearing loss significant enough to create hearing difficulty in at least some listening situations. Discussed benefits of amplification. Follow medical recommendations of Perez Jansen MD.     ASSESSMENT AND FINDINGS:     Otoscopy revealed: Clear ear canals bilaterally    RIGHT EAR:  Hearing Sensitivity: Mild sloping to severe sensorineural hearing loss. Speech Recognition Threshold: 35 dB HL  Word Recognition: Excellent (96%), based on NU-6 25-word list at 75 dBHL using recorded speech stimuli. Tympanometry: Flat, no peak pressure or compliance, Type B tympanogram, with typical ear canal volume, consistent with middle ear fluid.     LEFT EAR:  Hearing Sensitivity:

## 2021-06-01 NOTE — PROGRESS NOTES
[address] [facility] SURGERY  NEW PATIENT HISTORY AND PHYSICAL NOTE      Patient Name: [facility] Record Number:  [id number]  Primary Care Physician:  Lourdes Pantoja MD    ChiefComplaint     Chief Complaint   Patient presents with    Cerumen Impaction     States used wax remover in right ear, states she can hear a little bit better but her ear still snaps and crackles. States had both ears sprayed. History of Present Illness     Sudhir Barnes is an 80 y.o. female concern for cerumen impaction bilaterally. Attempt to use eardrops, with egress of cerumen from the right ear but not the left. Denies azar hearing loss, otalgia, otorrhea; left ear fullness appreciated. History of Parkinson's disease, on levodopa/carbidopa. Past Medical History     Past Medical History:   Diagnosis Date    Anxiety 7/20/2012    Arthritis     Asthma     Carpal tunnel syndrome of left wrist 10/6/2011    Depression 5/2/2012    GERD (gastroesophageal reflux disease) 9/4/2013    Hematuria     chronic from stone    History of nonadherence to medical treatment 10/11/2014    Hypertension     Kidney stone     saw Dr Ranjana Walker - 6 cm stone    Thyroid disease     goiter    Tight ring on finger     unable to remove - left hand    Wears dentures     full upper    Wears glasses     for reading       Past Surgical History     Past Surgical History:   Procedure Laterality Date    CARPAL TUNNEL RELEASE Right 09/14/2011    OPEN REDUCTION INTERNAL FIXATION RIGHT WRIST (RUSSO'S FRACTURE)    COLONOSCOPY N/A 7/1/2020    COLON (2:00) performed by Tian Pineda DO at [address] [facility] [place] [address] N/A 6/30/2020    FLEX SIG W/ANES.  performed by Tian Pineda DO at [address] [facility] Right 3/10/2020    RIGHT TOTAL KNEE REPLACEMENT       KANG & NEPHEW performed by Cira Radford MD at [address] [place] [address] Seen 4.2.20    No protocol    Ok to fill as requested?        ENDOSCOPY N/A 6/30/2020    EGD BIOPSY performed by Guero Rdz DO at [facility] 5  6/30/2020    EGD DILATION BALLOON performed by Guero Rdz DO at [facility] END[facility]       Family History     Family History   Problem Relation Age of Onset    Other Mother         scleroderma    Heart Disease Father         MI    Heart Attack Son 43    Diabetes Son        Social History     Social History     Tobacco Use    Smoking status: Never Smoker    Smokeless tobacco: Never Used   Vaping Use    Vaping Use: Never used   Substance Use Topics    Alcohol use: No    Drug use: No        Allergies     Allergies   Allergen Reactions    Hydrochlorothiazide      Constipation and severe leg cramps       Medications     Current Outpatient Medications   Medication Sig Dispense Refill    Lift Chair MISC by Does not apply route 1 each 0    UNABLE TO [facility] bed for patient home use. 1 each 0    carbidopa-levodopa (SINEMET)  MG per tablet Take 1 tablet by mouth 3 times daily 90 tablet 2    amLODIPine-benazepril (LOTREL) 5-20 MG per capsule TAKE 1 CAPSULE BY MOUTH EVERY DAY 90 capsule 1    pantoprazole (PROTONIX) 40 MG tablet Take 1 tablet by mouth every morning (before breakfast) 90 tablet 2    acetaminophen (TYLENOL) 500 MG tablet Take 500 mg by mouth daily      Cholecalciferol (CVS D3) 50 MCG (2000 UT) CAPS TAKE ONE CAPSULE BY MOUTH DAILY 90 capsule 3    albuterol sulfate  (90 Base) MCG/ACT inhaler Inhale 2 puffs into the lungs every 6 hours as needed for Wheezing 1 Inhaler 1    SENNA CO Take 1 tablet by mouth as needed       magnesium hydroxide (MILK OF MAGNESIA) 400 MG/5ML suspension Take 30 mLs by mouth daily as needed for Constipation 1 Bottle 0    meloxicam (MOBIC) 15 MG tablet Take 1 tablet by mouth daily 90 tablet 1     No current facility-administered medications for this visit.        Review of Systems     REVIEW OF SYSTEMS  The following systems were reviewed and revealed the following in addition to any already discussed in the HPI:    CONSTITUTIONAL: No weight loss, no fever, no night sweats, no chills  EYES: no vision changes, no blurry vision  EARS: Possible changes in hearing, no otalgia  NOSE: no epistaxis, no rhinorrhea  RESPIRATORY: No difficulty breathing, no shortness of breath  CV: no chest pain, no peripheral vascular disease  HEME: No coagulation disorder, no bleeding disorder  NEURO: No TIA or stroke-like symptoms  SKIN: No new rashes in the head and neck, no recent skin cancers  MOUTH: No new ulcers, no recent teeth infections  GASTROINTESTINAL: No diarrhea, stomach pain  PSYCH: No anxiety, no depression    PhysicalExam     Vitals:    06/01/21 1417   BP: 98/60   Site: Right Upper Arm   Position: Sitting   Cuff Size: Medium Adult   Pulse: 64   Weight: 130 lb 3.2 oz (59.1 kg)   Height: 5' 6\" (1.676 m)       PHYSICAL EXAM  BP 98/60 (Site: Right Upper Arm, Position: Sitting, Cuff Size: Medium Adult)   Pulse 64   Ht 5' 6\" (1.676 m)   Wt 130 lb 3.2 oz (59.1 kg)   BMI 21.01 kg/m²     GENERAL: No Acute Distress, Alert and Oriented, no hoarseness  EYES: EOMI, Anti-icteric  NOSE: On anterior rhinoscopy there is no epistaxis, nasal mucosa within normal limits, no purulent drainage  EARS: Normal external appearance; on portable otomicroscopy:  -Right ear: External auditory canal without stenosis, cerumen impaction appreciated  -Left ear: External auditory canal without stenosis, cerumen impaction appreciated  -Tuning fork testing: With a 512-Hz tuning fork the Amaya is midline, The Rinne on the right ear the is air>bone conduction, on the left ear air>bone conduction  FACE: 1/6 House-Brackmann Scale, symmetric, sensation equal bilaterally  ORAL CAVITY: No masses or lesions palpated, uvula is midline, moist mucous membranes, 0+ tonsils, absent dentition  NECK: Normal range of motion, no thyromegaly, trachea is midline, no lymphadenopathy, no neck masses, no crepitus  CHEST: Normal respiratory effort, no retractions, breathing comfortably  SKIN: No rashes, normal appearing skin, no evidence of skin lesions/tumors  NEURO: CN 2, 3, 4, 5, 6, 7, 11, 12 intact bilaterally     Data/Imaging Review            Procedure     Cerumen Debridement    Pre op: Cerumen impaction of the Bilateral ears. Post op: Normal ear examination   Procedure : Binocular otomicroscopy with debridement of cerumen  Surgeon: TASHI Haile  Estimated Blood Loss: None    Procedure:   Binocular otomicroscopy with debridement of cerumen impaction    After obtaining consent, the patient was placed in the examination chair in the reclined position.      -Right ear: External auditory canal with mild stenosis, canal skin with occluding cerumen, removed with suction and wax curette. Right tympanic membrane visible without without significant retractions or cholesteatoma identified, no middle ear effusions.   -Left ear: External auditory canal with mild stenosis, canal skin with occluding cerumen, removed with wax curette. Left tympanic membrane visible without without significant retractions or cholesteatoma identified, no middle ear effusions. * Patient tolerated the procedure well with no complications    Assessment and Plan     1. Hearing loss, unspecified hearing loss type, unspecified laterality  Mixed hearing loss bilaterally with small conductive gaps at low frequencies, diabetes mellitus, the right side-no middle ear effusion/tympanic membrane perforation appreciated. Amend audiometric testing yearly. She does not interested in hearing aids at this time  - [facility]    2. Cerumen impaction  Bilateral cerumen impaction-removed under binocular otomicroscopy, counseled patient to stop using Q-tips, she may remove wax in the future with Debrox drops    No follow-ups on file.     Arti Stallings MD  [facility]  Department of Otolaryngology/Head and Neck Surgery  6/1/21    I have performed a head and neck physical exam personally or was physically present during the key or critical portions of the service. Medical Decision Making: The following items were considered in medical decision making:  Independent review of images  Review / order clinical lab tests  Review / order radiology tests  Decision to obtain old records  Review and summation of old records as accessed through [employer] (a summary of my findings in these old records: None)     Portions of this note were dictated using Dragon.  There may be linguistic errors secondary to the use of this program.

## 2021-06-10 ENCOUNTER — OFFICE VISIT (OUTPATIENT)
Dept: INTERNAL MEDICINE CLINIC | Age: 86
End: 2021-06-10
Payer: COMMERCIAL

## 2021-06-10 VITALS
HEIGHT: 66 IN | SYSTOLIC BLOOD PRESSURE: 124 MMHG | BODY MASS INDEX: 20.25 KG/M2 | HEART RATE: 69 BPM | OXYGEN SATURATION: 99 % | WEIGHT: 126 LBS | DIASTOLIC BLOOD PRESSURE: 82 MMHG

## 2021-06-10 DIAGNOSIS — Z87.19 HISTORY OF GI BLEED: ICD-10-CM

## 2021-06-10 DIAGNOSIS — D50.8 OTHER IRON DEFICIENCY ANEMIA: ICD-10-CM

## 2021-06-10 DIAGNOSIS — M17.0 PRIMARY OSTEOARTHRITIS OF BOTH KNEES: ICD-10-CM

## 2021-06-10 DIAGNOSIS — Z00.00 ROUTINE GENERAL MEDICAL EXAMINATION AT A HEALTH CARE FACILITY: Primary | ICD-10-CM

## 2021-06-10 DIAGNOSIS — R53.1 WEAKNESS GENERALIZED: ICD-10-CM

## 2021-06-10 DIAGNOSIS — I10 HTN (HYPERTENSION), BENIGN: ICD-10-CM

## 2021-06-10 LAB
A/G RATIO: 2 (ref 1.1–2.2)
ALBUMIN SERPL-MCNC: 3.9 G/DL (ref 3.4–5)
ALP BLD-CCNC: 166 U/L (ref 40–129)
ALT SERPL-CCNC: 7 U/L (ref 10–40)
ANION GAP SERPL CALCULATED.3IONS-SCNC: 16 MMOL/L (ref 3–16)
AST SERPL-CCNC: 11 U/L (ref 15–37)
BASOPHILS ABSOLUTE: 0 K/UL (ref 0–0.2)
BASOPHILS RELATIVE PERCENT: 0.9 %
BILIRUB SERPL-MCNC: 0.4 MG/DL (ref 0–1)
BUN BLDV-MCNC: 23 MG/DL (ref 7–20)
CALCIUM SERPL-MCNC: 9.3 MG/DL (ref 8.3–10.6)
CHLORIDE BLD-SCNC: 103 MMOL/L (ref 99–110)
CHOLESTEROL, TOTAL: 166 MG/DL (ref 0–199)
CO2: 23 MMOL/L (ref 21–32)
CREAT SERPL-MCNC: 0.7 MG/DL (ref 0.6–1.2)
EOSINOPHILS ABSOLUTE: 0.1 K/UL (ref 0–0.6)
EOSINOPHILS RELATIVE PERCENT: 1 %
GFR AFRICAN AMERICAN: >60
GFR NON-AFRICAN AMERICAN: >60
GLOBULIN: 2 G/DL
GLUCOSE BLD-MCNC: 88 MG/DL (ref 70–99)
HCT VFR BLD CALC: 36.9 % (ref 36–48)
HDLC SERPL-MCNC: 53 MG/DL (ref 40–60)
HEMOGLOBIN: 12.3 G/DL (ref 12–16)
LDL CHOLESTEROL CALCULATED: 103 MG/DL
LYMPHOCYTES ABSOLUTE: 0.9 K/UL (ref 1–5.1)
LYMPHOCYTES RELATIVE PERCENT: 15.2 %
MCH RBC QN AUTO: 30.2 PG (ref 26–34)
MCHC RBC AUTO-ENTMCNC: 33.4 G/DL (ref 31–36)
MCV RBC AUTO: 90.3 FL (ref 80–100)
MONOCYTES ABSOLUTE: 0.4 K/UL (ref 0–1.3)
MONOCYTES RELATIVE PERCENT: 7.7 %
NEUTROPHILS ABSOLUTE: 4.2 K/UL (ref 1.7–7.7)
NEUTROPHILS RELATIVE PERCENT: 75.2 %
PDW BLD-RTO: 12.8 % (ref 12.4–15.4)
PLATELET # BLD: 163 K/UL (ref 135–450)
PMV BLD AUTO: 10.3 FL (ref 5–10.5)
POTASSIUM SERPL-SCNC: 4.3 MMOL/L (ref 3.5–5.1)
RBC # BLD: 4.09 M/UL (ref 4–5.2)
SODIUM BLD-SCNC: 142 MMOL/L (ref 136–145)
TOTAL PROTEIN: 5.9 G/DL (ref 6.4–8.2)
TRIGL SERPL-MCNC: 49 MG/DL (ref 0–150)
VLDLC SERPL CALC-MCNC: 10 MG/DL
WBC # BLD: 5.6 K/UL (ref 4–11)

## 2021-06-10 PROCEDURE — G0439 PPPS, SUBSEQ VISIT: HCPCS | Performed by: INTERNAL MEDICINE

## 2021-06-10 PROCEDURE — 36415 COLL VENOUS BLD VENIPUNCTURE: CPT | Performed by: INTERNAL MEDICINE

## 2021-06-10 PROCEDURE — 99214 OFFICE O/P EST MOD 30 MIN: CPT | Performed by: INTERNAL MEDICINE

## 2021-06-10 SDOH — ECONOMIC STABILITY: FOOD INSECURITY: WITHIN THE PAST 12 MONTHS, YOU WORRIED THAT YOUR FOOD WOULD RUN OUT BEFORE YOU GOT MONEY TO BUY MORE.: NEVER TRUE

## 2021-06-10 SDOH — ECONOMIC STABILITY: FOOD INSECURITY: WITHIN THE PAST 12 MONTHS, THE FOOD YOU BOUGHT JUST DIDN'T LAST AND YOU DIDN'T HAVE MONEY TO GET MORE.: NEVER TRUE

## 2021-06-10 ASSESSMENT — PATIENT HEALTH QUESTIONNAIRE - PHQ9
SUM OF ALL RESPONSES TO PHQ9 QUESTIONS 1 & 2: 2
SUM OF ALL RESPONSES TO PHQ QUESTIONS 1-9: 2
1. LITTLE INTEREST OR PLEASURE IN DOING THINGS: 1
2. FEELING DOWN, DEPRESSED OR HOPELESS: 1
SUM OF ALL RESPONSES TO PHQ QUESTIONS 1-9: 2
SUM OF ALL RESPONSES TO PHQ QUESTIONS 1-9: 2

## 2021-06-10 ASSESSMENT — SOCIAL DETERMINANTS OF HEALTH (SDOH): HOW HARD IS IT FOR YOU TO PAY FOR THE VERY BASICS LIKE FOOD, HOUSING, MEDICAL CARE, AND HEATING?: NOT HARD AT ALL

## 2021-06-10 ASSESSMENT — LIFESTYLE VARIABLES: HOW OFTEN DO YOU HAVE A DRINK CONTAINING ALCOHOL: 0

## 2021-06-10 NOTE — PROGRESS NOTES
Medicare Annual Wellness Visit  Name: Shelby Meadows Date: 6/10/2021   MRN: [id number] Sex: Female   Age: 80 y.o. Ethnicity: /   : 1935 Race: Yasmeen Stephenson is here for Medicare AWV and Hypertension    Screenings for behavioral, psychosocial and functional/safety risks, and cognitive dysfunction are all negative except as indicated below. These results, as well as other patient data from the [address] form, are documented in Flowsheets linked to this Encounter. Allergies   Allergen Reactions    Hydrochlorothiazide      Constipation and severe leg cramps       Prior to Visit Medications    Medication Sig Taking? Authorizing Provider   Lift Chair MISC by Does not apply route Yes Jonel Harris MD   [address] [facility] bed for patient home use.  Yes Diane Harris MD   carbidopa-levodopa (SINEMET)  MG per tablet Take 1 tablet by mouth 3 times daily Yes Mason Gallagher MD   amLODIPine-benazepril (LOTREL) 5-20 MG per capsule TAKE 1 CAPSULE BY MOUTH EVERY DAY Yes Fer Harris MD   pantoprazole (PROTONIX) 40 MG tablet Take 1 tablet by mouth every morning (before breakfast) Yes Diane Harris MD   acetaminophen (TYLENOL) 500 MG tablet Take 500 mg by mouth daily Yes Historical Provider, MD   Cholecalciferol (CVS D3) 50 MCG ( UT) CAPS TAKE ONE CAPSULE BY MOUTH DAILY Yes Diane Harris MD   albuterol sulfate  (90 Base) MCG/ACT inhaler Inhale 2 puffs into the lungs every 6 hours as needed for Wheezing Yes Diane Harris MD   SENNA CO Take 1 tablet by mouth as needed  Yes Historical Provider, MD   magnesium hydroxide (MILK OF MAGNESIA) 400 MG/5ML suspension Take 30 mLs by mouth daily as needed for Constipation Yes Diane Harris MD   meloxicam (MOBIC) 15 MG tablet Take 1 tablet by mouth daily  Jonel Harris MD       Past Medical History:   Diagnosis Date    Anxiety 2012    Arthritis     Asthma     Carpal tunnel syndrome of left wrist 10/6/2011    Depression 5/2/2012    GERD (gastroesophageal reflux disease) 9/4/2013    Hematuria     chronic from stone    History of nonadherence to medical treatment 10/11/2014    Hypertension     Kidney stone     saw Dr RU CEJA - 6 cm stone    Thyroid disease     goiter    Tight ring on finger     unable to remove - left hand    Wears dentures     full upper    Wears glasses     for reading       Past Surgical History:   Procedure Laterality Date    CARPAL TUNNEL RELEASE Right 09/14/2011    OPEN REDUCTION INTERNAL FIXATION RIGHT WRIST (RUSSO'S FRACTURE)    COLONOSCOPY N/A 7/1/2020    COLON (2:00) performed by Salma Benson DO at [address] [facility] [address] N/A 6/30/2020    FLEX SIG W/ANES. performed by Salma Benson DO at [address] [facility] Right 3/10/2020    RIGHT TOTAL KNEE REPLACEMENT       KANG & NEPHEW performed by Samira Oakes MD at [address] [facility] 6/30/2020    EGD BIOPSY performed by Salma Benson DO at [address] [facility]  6/30/2020    EGD DILATION BALLOON performed by Salma Benson DO at [facility]       Family History   Problem Relation Age of Onset    Other Mother         scleroderma    Heart Disease Father         MI    Heart Attack Son 43    Diabetes Son        CareTeam (Including outside providers/suppliers regularly involved in providing care):   Patient Care Team:  Mady Campuzano MD as PCP - General (Internal Medicine)  Steve Harris MD as PCP - [facility] Empaneled Provider    Wt Readings from Last 3 Encounters:   06/10/21 126 lb (57.2 kg)   06/01/21 130 lb 3.2 oz (59.1 kg)   05/12/21 132 lb (59.9 kg)     Vitals:    06/10/21 1028   BP: 124/82   Pulse: 69   SpO2: 99%   Weight: 126 lb (57.2 kg)   Height: 5' 6\" (1.676 m)     Body mass index is 20.34 kg/m².     Based upon direct observation of the patient, evaluation of cognition reveals recent and remote memory intact. Patient's complete Health Risk Assessment and screening values have been reviewed and are found in Flowsheets. The following problems were reviewed today and where indicated follow up appointments were made and/or referrals ordered. Positive Risk Factor Screenings with Interventions:          General Health and ACP:  General  In general, how would you say your health is?: Fair  In the past 7 days, have you experienced any of the following? New or Increased Pain, New or Increased Fatigue, Loneliness, Social Isolation, Stress or Anger?: None of These  Do you get the social and emotional support that you need?: Yes  Do you have a Living Will?: (!) No (needs to make one)  Advance Directives     Power of [address] [place] [address] Will ACP-Advance Directive ACP-Power of     Not on File Not on File Not on File Not on File      General Health Risk Interventions:  · No Living Will: she'll think about it     Hearing/Vision:  No exam data present  Hearing/Vision  Do you or your family notice any trouble with your hearing that hasn't been managed with hearing aids?: No  Do you have difficulty driving, watching TV, or doing any of your daily activities because of your eyesight?: No  Have you had an eye exam within the past year?: (!) No (needs to make one)  Hearing/Vision Interventions:  · Vision concerns:  patient encouraged to make appointment with his/her eye specialist     ADL:  ADLs  In the past 7 days, did you need help from others to perform any of the following everyday activities? Eating, dressing, grooming, bathing, toileting, or walking/balance?: (!) Walking/Balance (uses walker)  In the past 7 days, did you need help from others to take care of any of the following?  Laundry, housekeeping, banking/finances, shopping, telephone use, food preparation, transportation, or taking medications?: [employer], Housekeeping, Laundry, Shopping (has an aide, and grandson helps her get to where she needs to go.)  ADL Interventions:  · Has an aid    Personalized Preventive Plan   Current Health Maintenance Status  Immunization History   Administered Date(s) Administered    Influenza, Triv, inactivated, subunit, adjuvanted, IM (Fluad 65 yrs and older) 11/14/2019    PPD Test 12/17/2008    Pneumococcal Conjugate 13-valent (Duanne Heft) 10/18/2017    Pneumococcal Polysaccharide ([id number]) 12/31/1996, 12/17/2008    Td, unspecified formulation 12/17/2008        Health Maintenance   Topic Date Due    COVID-19 Vaccine (1) Never done    DTaP/Tdap/Td vaccine (1 - Tdap) 02/09/1954    Shingles Vaccine (1 of 2) Never done   [employer] Visit (AWV)  Never done    Potassium monitoring  07/02/2021    Creatinine monitoring  07/02/2021    Flu vaccine (Season Ended) 09/01/2021    Pneumococcal 65+ years Vaccine  Completed    Hepatitis A vaccine  Aged Out    Hepatitis B vaccine  Aged Out    Hib vaccine  Aged Out    Meningococcal (ACWY) vaccine  Aged Out     Recommendations for Nexstim Due: see orders and patient instructions/AVS.  . Recommended screening schedule for the next 5-10 years is provided to the patient in written form: see Patient Mehul Chowdhury was seen today for medicare awv and hypertension.     Diagnoses and all orders for this visit:    Routine general medical examination at a health care facility

## 2021-06-10 NOTE — PATIENT INSTRUCTIONS
Personalized Preventive Plan for Madhavi Monreal - 6/10/2021  Medicare offers a range of preventive health benefits. Some of the tests and screenings are paid in full while other may be subject to a deductible, co-insurance, and/or copay. Some of these benefits include a comprehensive review of your medical history including lifestyle, illnesses that may run in your family, and various assessments and screenings as appropriate. After reviewing your medical record and screening and assessments performed today your provider may have ordered immunizations, labs, imaging, and/or referrals for you. A list of these orders (if applicable) as well as your Preventive Care list are included within your After Visit Summary for your review. Other Preventive Recommendations:    · A preventive eye exam performed by an eye specialist is recommended every 1-2 years to screen for glaucoma; cataracts, macular degeneration, and other eye disorders. · A preventive dental visit is recommended every 6 months. · Try to get at least 150 minutes of exercise per week or 10,000 steps per day on a pedometer . · Order or download the FREE \"Exercise & Physical Activity: Your Everyday Guide\" from [employer]. Call 2-111.369.8925 or search [employer] online. · You need 1033-0783 mg of calcium and 6052-7475 IU of vitamin D per day. It is possible to meet your calcium requirement with diet alone, but a vitamin D supplement is usually necessary to meet this goal.  · When exposed to the sun, use a sunscreen that protects against both UVA and UVB radiation with an SPF of 30 or greater. Reapply every 2 to 3 hours or after sweating, drying off with a towel, or swimming. · Always wear a seat belt when traveling in a car. Always wear a helmet when riding a bicycle or motorcycle.

## 2021-06-10 NOTE — PROGRESS NOTES
0.98 02/25/2020      No results found for: PSA   No results found for: [address]     Patient Active Problem List   Diagnosis    Carpal tunnel syndrome of left wrist    Mild intermittent asthma    Goiter    Vitamin D deficiency    Staghorn calculus    Biceps rupture, proximal    Incomplete tear of right rotator cuff    History of nonadherence to medical treatment    B12 deficiency    Primary osteoarthritis of both knees    Irritable bowel syndrome with constipation    Environmental and seasonal allergies    Weakness generalized    Status post total right knee replacement    S/P total knee arthroplasty, right    Blood per rectum    BRBPR (bright red blood per rectum)    Acute blood loss anemia    Parkinson disease, symptomatic (HCC)    NSAID long-term use    Acute pain of right shoulder    Parkinsonian tremor (HCC)    HTN (hypertension), benign    Iron deficiency anemia    Sensorineural hearing loss, bilateral       Allergies   Allergen Reactions    Hydrochlorothiazide      Constipation and severe leg cramps     Outpatient Medications Marked as Taking for the 6/10/21 encounter (Office Visit) with Shadi Cordova MD   Medication Sig Dispense Refill    Lift Chair [address] [facility] by Does not apply route 1 each 0    UNABLE TO [facility] bed for patient home use.  1 each 0    carbidopa-levodopa (SINEMET)  MG per tablet Take 1 tablet by mouth 3 times daily 90 tablet 2    amLODIPine-benazepril (LOTREL) 5-20 MG per capsule TAKE 1 CAPSULE BY MOUTH EVERY DAY 90 capsule 1    pantoprazole (PROTONIX) 40 MG tablet Take 1 tablet by mouth every morning (before breakfast) 90 tablet 2    acetaminophen (TYLENOL) 500 MG tablet Take 500 mg by mouth daily      Cholecalciferol (CVS D3) 50 MCG (2000 UT) CAPS TAKE ONE CAPSULE BY MOUTH DAILY 90 capsule 3    albuterol sulfate  (90 Base) MCG/ACT inhaler Inhale 2 puffs into the lungs every 6 hours as needed for Wheezing 1 Inhaler 1    SENNA CO Take 1 tablet by

## 2021-06-16 ENCOUNTER — TELEPHONE (OUTPATIENT)
Dept: INTERNAL MEDICINE CLINIC | Age: 86
End: 2021-06-16

## 2021-06-16 NOTE — TELEPHONE ENCOUNTER
Guillaume Londono, home health nurse, with [facility]- requesting something for patients heels to prevent break down. States patient spends 18-20 hours in bed and her heels have been hurting. Suggested \"a wipe similar to alcohol that you would cleanse the skin with prior to like inserting a needle\"? Please advise.

## 2021-06-16 NOTE — TELEPHONE ENCOUNTER
Have pt put a pillow or something soft under ankle area so heel doesn't touch the bed to avoid pressure sores if that's what she's getting.

## 2021-07-22 ENCOUNTER — TELEPHONE (OUTPATIENT)
Dept: INTERNAL MEDICINE CLINIC | Age: 86
End: 2021-07-22

## 2021-07-22 ENCOUNTER — OFFICE VISIT (OUTPATIENT)
Dept: NEUROLOGY | Age: 86
End: 2021-07-22
Payer: COMMERCIAL

## 2021-07-22 VITALS
DIASTOLIC BLOOD PRESSURE: 72 MMHG | HEART RATE: 65 BPM | OXYGEN SATURATION: 99 % | SYSTOLIC BLOOD PRESSURE: 121 MMHG | BODY MASS INDEX: 19.78 KG/M2 | WEIGHT: 126 LBS | HEIGHT: 67 IN

## 2021-07-22 DIAGNOSIS — G20 PARKINSONIAN TREMOR (HCC): ICD-10-CM

## 2021-07-22 DIAGNOSIS — G20 PARKINSON DISEASE, SYMPTOMATIC (HCC): Primary | ICD-10-CM

## 2021-07-22 DIAGNOSIS — I10 HTN (HYPERTENSION), BENIGN: ICD-10-CM

## 2021-07-22 PROCEDURE — 99213 OFFICE O/P EST LOW 20 MIN: CPT | Performed by: PSYCHIATRY & NEUROLOGY

## 2021-07-22 NOTE — PROGRESS NOTES
The patient came today for follow up regarding: Parkinsonian tremors and Parkinson disease    Since the patient's last visit, she denies any worsening of her tremors. She lives by her self with the . She walks with her walker. No recent falling or injury. She takes Sinemet  mg tablet, 3 times daily. No side effect. The same day tremors of both hands more left than right. Degree is mild to moderate but persistent. Worse with activity or at rest.  So far she is able to function at home. She denies any worsening of her memory or severe insomnia. No recent falling, fever or chills. No other relieving or aggravating factors. Other review of system was unremarkable. Exam:   Constitutional:   Vitals:    07/22/21 1200   BP: 121/72   Pulse: 65   SpO2: 99%   Weight: 126 lb (57.2 kg)   Height: 5' 7\" (1.702 m)       General appearance:  Normal development and appear in no acute distress. Mental Status:   Oriented to person, place, problem, and time. Memory: Impaired immediate recall. Intact remote memory  Normal attention span and concentration. Language: intact naming, repeating and fluency   Good fund of Knowledge. Aware of current events and vocabulary   Cranial Nerves:   II: Pupils: equal, round, reactive to light  III,IV,VI: Extra Ocular Movements are intact. No nystagmus  V: Facial sensation is intact   VII: Facial strength and movements: intact and symmetric  XII: Tongue movements are normal  Musculoskeletal: Poor  range of motion in her shoulders bilaterally. No focal weakness. The same resting hand tremors more left than right  No severe cogwheeling today but poor REM  Normal sensation  Gait slow gait with her walker.     ROS : A 10-14 system review of constitutional, cardiovascular, respiratory, GI, eyes, , ENT, musculoskeletal, endocrine, hematological, skin, SHEENT, genitourinary, psychiatric and neurologic systems was obtained and updated today which is unremarkable except as mentioned in my HPI      Medical decision making:  I personally reviewed and updated social history, past medical history, medications, allergy, surgical history, and family history as documented in the patient's electronic health records. Labs and/or neuroimaging and other test results reviewed and discussed with the patient. Reviewed notes from other physicians. Provided patient education regarding risk, benefits and treatment options as well as adherence to medication regimen and side effect from these medications. Assessment:  1. Parkinson disease, symptomatic ([address]  2. Parkinsonian tremor ([address] not controlled    Continue Sinemet the same dose 3 times daily. She is not interested in increasing dose at this point  Refill when needed  Discussed risk of falling and injury and the need to use her walker at all time        3.  HTN (hypertension), benign  Continue current blood pressure medications  Hydration  Monitor blood pressure at home    Follow-up 6-month

## 2021-07-22 NOTE — TELEPHONE ENCOUNTER
[facility] requesting H/P, Med list, and Demographics sheet. Respite stay while azeb is out of town for ~07/29-08/05. [facility] [facility] [address]     [phone]  Fax [phone]        Requested items printed and faxed. Copies given to Sylvain.

## 2021-08-11 DIAGNOSIS — G20 PARKINSON DISEASE, SYMPTOMATIC (HCC): ICD-10-CM

## 2021-08-11 NOTE — TELEPHONE ENCOUNTER
Last seen 07.22.21    Last office note states to RTO 6 months/01.2022    Next appointment scheduled for 01.13.22

## 2021-08-25 ENCOUNTER — VIRTUAL VISIT (OUTPATIENT)
Dept: INTERNAL MEDICINE CLINIC | Age: 86
End: 2021-08-25
Payer: COMMERCIAL

## 2021-08-25 DIAGNOSIS — I10 ESSENTIAL HYPERTENSION: ICD-10-CM

## 2021-08-25 DIAGNOSIS — M17.0 PRIMARY OSTEOARTHRITIS OF BOTH KNEES: ICD-10-CM

## 2021-08-25 DIAGNOSIS — E55.9 VITAMIN D DEFICIENCY: ICD-10-CM

## 2021-08-25 DIAGNOSIS — K62.5 BLOOD PER RECTUM: ICD-10-CM

## 2021-08-25 PROCEDURE — 99213 OFFICE O/P EST LOW 20 MIN: CPT | Performed by: INTERNAL MEDICINE

## 2021-08-25 RX ORDER — AMLODIPINE BESYLATE AND BENAZEPRIL HYDROCHLORIDE 5; 20 MG/1; MG/1
CAPSULE ORAL
Qty: 90 CAPSULE | Refills: 1 | Status: SHIPPED | OUTPATIENT
Start: 2021-08-25 | End: 2022-03-01 | Stop reason: ALTCHOICE

## 2021-08-25 RX ORDER — MELOXICAM 15 MG/1
15 TABLET ORAL DAILY
Qty: 90 TABLET | Refills: 3 | Status: SHIPPED | OUTPATIENT
Start: 2021-08-25 | End: 2022-06-29 | Stop reason: SDUPTHER

## 2021-08-25 RX ORDER — PANTOPRAZOLE SODIUM 40 MG/1
40 TABLET, DELAYED RELEASE ORAL
Qty: 90 TABLET | Refills: 3 | Status: SHIPPED | OUTPATIENT
Start: 2021-08-25 | End: 2022-06-29 | Stop reason: SDUPTHER

## 2022-01-25 ENCOUNTER — VIRTUAL VISIT (OUTPATIENT)
Dept: INTERNAL MEDICINE CLINIC | Age: 87
End: 2022-01-25
Payer: COMMERCIAL

## 2022-01-25 DIAGNOSIS — M54.2 CERVICALGIA: ICD-10-CM

## 2022-01-25 DIAGNOSIS — Z79.1 NSAID LONG-TERM USE: ICD-10-CM

## 2022-01-25 DIAGNOSIS — M17.0 PRIMARY OSTEOARTHRITIS OF BOTH KNEES: ICD-10-CM

## 2022-01-25 DIAGNOSIS — G20 PARKINSON DISEASE (HCC): ICD-10-CM

## 2022-01-25 DIAGNOSIS — R53.1 WEAKNESS GENERALIZED: ICD-10-CM

## 2022-01-25 DIAGNOSIS — I10 ESSENTIAL HYPERTENSION: Primary | ICD-10-CM

## 2022-01-25 PROCEDURE — 99214 OFFICE O/P EST MOD 30 MIN: CPT | Performed by: INTERNAL MEDICINE

## 2022-01-25 NOTE — PROGRESS NOTES
Pursuant to the emergency declaration under the [facility] authority and the [employer] Act, this Virtual  Video Visit was conducted, with patient's consent, to reduce the patient's risk of exposure to COVID-19 and provide continuity of care. Service is  provided through a video synchronous discussion virtually to substitute for in-person clinic visit with the patient being at home and Dr. Reggie Smith being at home. Patient consent to the video visit. Date of Service:  1/25/2022    Chief Complaint:      Chief Complaint   Patient presents with    Hypertension     f/up    Neck Pain    Arthritis       Assessment/Plan:    Lavonne De Dios was seen today for hypertension and anemia. Diagnoses and all orders for this visit:    Essential hypertension  Decrease Lotrel 3/20 mg qod and monitor BP to make sure it's < 140/90    Cervicalgia  Increase Tylenol 1 three times a day as needed     Primary osteoarthritis of both knees    Weakness generalized    NSAID long-term use    Parkinson disease ([facility] [address]    Stable and continue on current medications. Return VV March 1 at 11:10 BP and neck pain. HPI:  Omega Lindsey is a 80 y.o. She complain of pain in the back of her neck for the past month. She thinks it may be due to sleeping on her back at night. She also complains of being lightheaded twice when she's having a bowel movement. She denies being constipated. Parkinson:  Stable on Sinemet 25/100 mg bid.      Hypertension:  Home blood pressure monitoring: Yes - 108/64 on Lotrel 5/20 mg qd.   She is adherent to a low sodium diet.  Patient denies chest pain, shortness of breath, headache, lightheadedness, blurred vision, peripheral edema, palpitations, dry cough and fatigue.  Antihypertensive medication side effects: no medication side effects noted.  Use of agents associated with hypertension: none.    Bilateral knee OA s/p Right TKR: Stable on Mobic 15 mg qd since Celebrex is too expensive. H/O rectal bleed:  Stable on Protonix 40 mg qd  Chronic Constipation:  stable on otc Senna and Milk of Mag. Colace didn't work. Anemia:  Still on Iron qod.     Lab Results   Component Value Date    LABA1C 5.2 02/25/2020    LABMICR YES 06/27/2020     Lab Results   Component Value Date     06/10/2021    K 4.3 06/10/2021     06/10/2021    CO2 23 06/10/2021    BUN 23 (H) 06/10/2021    CREATININE 0.7 06/10/2021    GLUCOSE 88 06/10/2021    CALCIUM 9.3 06/10/2021     Lab Results   Component Value Date    CHOL 166 06/10/2021    TRIG 49 06/10/2021    HDL 53 06/10/2021    LDLCALC 103 06/10/2021     Lab Results   Component Value Date    ALT 7 (L) 06/10/2021    AST 11 (L) 06/10/2021     Lab Results   Component Value Date    TSH 0.57 05/19/2015    TSH 1.18 10/06/2011    T4FREE 1.05 10/06/2011    T4FREE 1.05 10/06/2011     Lab Results   Component Value Date    WBC 5.6 06/10/2021    HGB 12.3 06/10/2021    HCT 36.9 06/10/2021    MCV 90.3 06/10/2021     06/10/2021     Lab Results   Component Value Date    INR 1.04 06/27/2020    INR 0.98 02/25/2020      No results found for: PSA   No results found for: [facility]     Patient Active Problem List   Diagnosis    Carpal tunnel syndrome of left wrist    Mild intermittent asthma    Goiter    Vitamin D deficiency    Staghorn calculus    Biceps rupture, proximal    Incomplete tear of right rotator cuff    History of nonadherence to medical treatment    B12 deficiency    Primary osteoarthritis of both knees    Irritable bowel syndrome with constipation    Environmental and seasonal allergies    Weakness generalized    Status post total right knee replacement    S/P total knee arthroplasty, right    Blood per rectum    BRBPR (bright red blood per rectum)    Acute blood loss anemia    Parkinson disease, symptomatic (HCC)    NSAID long-term use    Acute pain of right shoulder    Parkinsonian tremor (HCC)    HTN (hypertension), benign    Iron deficiency anemia    Sensorineural hearing loss, bilateral    History of GI bleed       Allergies   Allergen Reactions    Hydrochlorothiazide      Constipation and severe leg cramps     Outpatient Medications Marked as Taking for the 1/25/22 encounter (Virtual Visit) with Yessica Harris MD   Medication Sig Dispense Refill    Cholecalciferol ([employer] D3) 50 MCG (2000 UT) CAPS TAKE 1 CAPSULE BY MOUTH EVERY DAY 90 capsule 2    amLODIPine-benazepril (LOTREL) 5-20 MG per capsule TAKE 1 CAPSULE BY MOUTH EVERY DAY 90 capsule 1    pantoprazole (PROTONIX) 40 MG tablet Take 1 tablet by mouth every morning (before breakfast) 90 tablet 3    carbidopa-levodopa (SINEMET)  MG per tablet TAKE 1 TABLET BY MOUTH THREE TIMES A  tablet 1    Lift Chair MISC by Does not apply route 1 each 0    UNABLE TO [facility] bed for patient home use. 1 each 0    acetaminophen (TYLENOL) 500 MG tablet Take 500 mg by mouth daily      albuterol sulfate  (90 Base) MCG/ACT inhaler Inhale 2 puffs into the lungs every 6 hours as needed for Wheezing 1 Inhaler 1    SENNA CO Take 1 tablet by mouth as needed       magnesium hydroxide (MILK OF MAGNESIA) 400 MG/5ML suspension Take 30 mLs by mouth daily as needed for Constipation 1 Bottle 0         Review of Systems: 14 systems were negative except of what was stated on HPI    Nursing note and vitals reviewed. There were no vitals filed for this visit. Wt Readings from Last 3 Encounters:   07/22/21 126 lb (57.2 kg)   06/10/21 126 lb (57.2 kg)   06/01/21 130 lb 3.2 oz (59.1 kg)     BP Readings from Last 3 Encounters:   07/22/21 121/72   06/10/21 124/82   06/01/21 98/60     There is no height or weight on file to calculate BMI. Constitutional: Patient appears well-developed and well-nourished. No distress. Head: Normocephalic and atraumatic. Skin: No rash or erythema. Psychiatric: Normal mood and affect. Behavior is normal.

## 2022-03-01 ENCOUNTER — TELEMEDICINE (OUTPATIENT)
Dept: INTERNAL MEDICINE CLINIC | Age: 87
End: 2022-03-01
Payer: COMMERCIAL

## 2022-03-01 DIAGNOSIS — M54.2 CERVICALGIA: Primary | ICD-10-CM

## 2022-03-01 DIAGNOSIS — M17.0 PRIMARY OSTEOARTHRITIS OF BOTH KNEES: ICD-10-CM

## 2022-03-01 PROCEDURE — 99213 OFFICE O/P EST LOW 20 MIN: CPT | Performed by: INTERNAL MEDICINE

## 2022-03-01 NOTE — PROGRESS NOTES
LABMICR YES 06/27/2020     Lab Results   Component Value Date     06/10/2021    K 4.3 06/10/2021     06/10/2021    CO2 23 06/10/2021    BUN 23 (H) 06/10/2021    CREATININE 0.7 06/10/2021    GLUCOSE 88 06/10/2021    CALCIUM 9.3 06/10/2021     Lab Results   Component Value Date    CHOL 166 06/10/2021    TRIG 49 06/10/2021    HDL 53 06/10/2021    LDLCALC 103 06/10/2021     Lab Results   Component Value Date    ALT 7 (L) 06/10/2021    AST 11 (L) 06/10/2021     Lab Results   Component Value Date    TSH 0.57 05/19/2015    TSH 1.18 10/06/2011    T4FREE 1.05 10/06/2011    T4FREE 1.05 10/06/2011     Lab Results   Component Value Date    WBC 5.6 06/10/2021    HGB 12.3 06/10/2021    HCT 36.9 06/10/2021    MCV 90.3 06/10/2021     06/10/2021     Lab Results   Component Value Date    INR 1.04 06/27/2020    INR 0.98 02/25/2020      No results found for: PSA   No results found for: LABURIC     Patient Active Problem List   Diagnosis    Carpal tunnel syndrome of left wrist    Mild intermittent asthma    Goiter    Vitamin D deficiency    Staghorn calculus    Biceps rupture, proximal    Incomplete tear of right rotator cuff    History of nonadherence to medical treatment    B12 deficiency    Primary osteoarthritis of both knees    Irritable bowel syndrome with constipation    Environmental and seasonal allergies    Weakness generalized    Status post total right knee replacement    S/P total knee arthroplasty, right    Blood per rectum    BRBPR (bright red blood per rectum)    Acute blood loss anemia    Parkinson disease, symptomatic (HCC)    NSAID long-term use    Acute pain of right shoulder    Parkinsonian tremor (HCC)    HTN (hypertension), benign    Iron deficiency anemia    Sensorineural hearing loss, bilateral    History of GI bleed       Allergies   Allergen Reactions    Hydrochlorothiazide      Constipation and severe leg cramps     Outpatient Medications Marked as Taking for the 3/1/22 encounter (Telemedicine) with Claudia Harris MD   Medication Sig Dispense Refill    Cholecalciferol (CVS D3) 50 MCG (2000 UT) CAPS TAKE 1 CAPSULE BY MOUTH EVERY DAY 90 capsule 2    amLODIPine-benazepril (LOTREL) 5-20 MG per capsule TAKE 1 CAPSULE BY MOUTH EVERY DAY 90 capsule 1    pantoprazole (PROTONIX) 40 MG tablet Take 1 tablet by mouth every morning (before breakfast) 90 tablet 3    carbidopa-levodopa (SINEMET)  MG per tablet TAKE 1 TABLET BY MOUTH THREE TIMES A  tablet 1    Lift Chair MISC by Does not apply route 1 each 0    UNABLE TO [facility] bed for patient home use. 1 each 0    acetaminophen (TYLENOL) 500 MG tablet Take 500 mg by mouth daily      albuterol sulfate  (90 Base) MCG/ACT inhaler Inhale 2 puffs into the lungs every 6 hours as needed for Wheezing 1 Inhaler 1    SENNA CO Take 1 tablet by mouth as needed       magnesium hydroxide (MILK OF MAGNESIA) 400 MG/5ML suspension Take 30 mLs by mouth daily as needed for Constipation 1 Bottle 0         Review of Systems: 14 systems were negative except of what was stated on HPI    Nursing note and vitals reviewed. There were no vitals filed for this visit. Wt Readings from Last 3 Encounters:   07/22/21 126 lb (57.2 kg)   06/10/21 126 lb (57.2 kg)   06/01/21 130 lb 3.2 oz (59.1 kg)     BP Readings from Last 3 Encounters:   07/22/21 121/72   06/10/21 124/82   06/01/21 98/60     There is no height or weight on file to calculate BMI. Constitutional: Patient appears well-developed and well-nourished. No distress. Head: Normocephalic and atraumatic. Psychiatric: Normal mood and affect.  Behavior is normal.

## 2022-03-29 DIAGNOSIS — G20 PARKINSON DISEASE, SYMPTOMATIC (HCC): ICD-10-CM

## 2022-03-31 ENCOUNTER — OFFICE VISIT (OUTPATIENT)
Dept: NEUROLOGY | Age: 87
End: 2022-03-31
Payer: COMMERCIAL

## 2022-03-31 VITALS
OXYGEN SATURATION: 95 % | SYSTOLIC BLOOD PRESSURE: 148 MMHG | BODY MASS INDEX: 19.73 KG/M2 | HEART RATE: 65 BPM | DIASTOLIC BLOOD PRESSURE: 94 MMHG | HEIGHT: 67 IN

## 2022-03-31 DIAGNOSIS — M50.920 CERVICAL DISC DISORDER OF MID-CERVICAL REGION: ICD-10-CM

## 2022-03-31 DIAGNOSIS — I10 HTN (HYPERTENSION), BENIGN: ICD-10-CM

## 2022-03-31 DIAGNOSIS — G20 PARKINSONIAN TREMOR (HCC): ICD-10-CM

## 2022-03-31 DIAGNOSIS — E44.0 MODERATE MALNUTRITION (HCC): ICD-10-CM

## 2022-03-31 DIAGNOSIS — G20 PARKINSON DISEASE, SYMPTOMATIC (HCC): Primary | ICD-10-CM

## 2022-03-31 PROBLEM — G62.9 POLYNEUROPATHY: Status: ACTIVE | Noted: 2022-03-31

## 2022-03-31 PROBLEM — G37.9 DEMYELINATING DISEASE (HCC): Status: ACTIVE | Noted: 2022-03-31

## 2022-03-31 PROBLEM — G35 MS (MULTIPLE SCLEROSIS) (HCC): Status: ACTIVE | Noted: 2022-03-31

## 2022-03-31 PROBLEM — R20.0 NUMBNESS: Status: ACTIVE | Noted: 2022-03-31

## 2022-03-31 PROCEDURE — 99214 OFFICE O/P EST MOD 30 MIN: CPT | Performed by: PSYCHIATRY & NEUROLOGY

## 2022-03-31 NOTE — PROGRESS NOTES
The patient came today for follow up regarding: Parkinsonian tremors and Parkinson disease    Since her last visit, she continues to take Sinemet  mg tablet, 3 times a day. She lives by herself and family visit her every day. She is independent. She denies any recent falling. Continues to have daily tremors of both hands more right than left. More at rest with rigidity. Slow gait but no recurrent falling at home. Has not any recent PT and OT. No major memory impairment, delusion or hallucination. She does have chronic insomnia. She is complaining of chronic neck pain with decreased range of motion in both shoulders more right than left and decreased range of motion her neck. So far she is able to function by herself with the . No major weight loss since her last visit. She denies any speech or swallowing issues or severe depression. Other review of system was unremarkable. Exam:   Constitutional:   Vitals:    03/31/22 1220   BP: (!) 148/94   Site: Left Wrist   Position: Sitting   Pulse: 65   SpO2: 95%   Height: 5' 7\" (1.702 m)       General appearance:  NAD. Mental Status:   Oriented to person, place, problem, and time. Memory: Impaired immediate recall. Intact remote memory  Normal attention span and concentration. Language: intact naming, repeating and fluency   Good fund of Knowledge. Aware of current events and vocabulary   Cranial Nerves:   II: Pupils: equal, round, reactive to light  III,IV,VI: Extra Ocular Movements are intact. No nystagmus  V: Facial sensation is intact   VII: Facial strength and movements: intact and symmetric  XII: Tongue movements are normal  Musculoskeletal: Decreased rang of motion of both shoulder more right than left and decreased range of motion her neck muscles bilaterally. Resting tremors with pill-rolling tremors, cogwheeling rigidity in both arms. Poor REM. Normal sensation  Gait is slow. Lashell Alonso     ROS : A 10-14 system review of constitutional, cardiovascular, respiratory, GI, eyes, , ENT, musculoskeletal, endocrine, hematological, skin, SHEENT, genitourinary, psychiatric and neurologic systems was obtained and updated today which is unremarkable except as mentioned in my HPI      Medical decision making:  I personally reviewed and updated social history, past medical history, medications, allergy, surgical history, and family history as documented in the patient's electronic health records. Labs and/or neuroimaging and other test results reviewed and discussed with the patient. Reviewed notes from other physicians. Provided patient education regarding risk, benefits and treatment options as well as adherence to medication regimen and side effect from these medications. Assessment:     Diagnosis Orders   1. Parkinson disease, symptomatic ([address]  Ambulatory referral to Physical Therapy   2. Parkinsonian tremor ([address]     3. HTN (hypertension), benign     4. Moderate malnutrition (HCC)     5. Cervical disc disorder of mid-cervical region  Ambulatory referral to Physical Therapy       Increase Sinemet  mg tablets to 4 times a day. Refill for medication when needed  Discussed risk of falling and injury at home  Refer to PT and OT for history of Parkinson disease as well as chronic cervical DJD   Advance nutrition  Blood pressure monitor at home  Multivitamin daily  Improving sleep hygiene at night and can use melatonin  We will consider muscle relaxants as needed for her neck pain. I do not feel MRI would change our management at this point since she is not a candidate for surgical intervention  Follow-up with me 6 months or sooner if new symptoms arise.

## 2022-04-20 DIAGNOSIS — I10 ESSENTIAL HYPERTENSION: ICD-10-CM

## 2022-04-20 RX ORDER — AMLODIPINE BESYLATE AND BENAZEPRIL HYDROCHLORIDE 5; 20 MG/1; MG/1
CAPSULE ORAL
Qty: 90 CAPSULE | Refills: 1 | OUTPATIENT
Start: 2022-04-20

## 2022-06-29 ENCOUNTER — OFFICE VISIT (OUTPATIENT)
Dept: INTERNAL MEDICINE CLINIC | Age: 87
End: 2022-06-29
Payer: COMMERCIAL

## 2022-06-29 VITALS
HEART RATE: 82 BPM | OXYGEN SATURATION: 99 % | DIASTOLIC BLOOD PRESSURE: 66 MMHG | SYSTOLIC BLOOD PRESSURE: 104 MMHG | BODY MASS INDEX: 16.17 KG/M2 | HEIGHT: 67 IN | WEIGHT: 103 LBS

## 2022-06-29 DIAGNOSIS — K62.5 BLOOD PER RECTUM: ICD-10-CM

## 2022-06-29 DIAGNOSIS — Z00.00 MEDICARE ANNUAL WELLNESS VISIT, SUBSEQUENT: Primary | ICD-10-CM

## 2022-06-29 DIAGNOSIS — E55.9 VITAMIN D DEFICIENCY: ICD-10-CM

## 2022-06-29 DIAGNOSIS — J45.20 MILD INTERMITTENT ASTHMA WITHOUT COMPLICATION: ICD-10-CM

## 2022-06-29 DIAGNOSIS — M17.0 PRIMARY OSTEOARTHRITIS OF BOTH KNEES: ICD-10-CM

## 2022-06-29 DIAGNOSIS — M79.602 LEFT ARM PAIN: ICD-10-CM

## 2022-06-29 PROCEDURE — G0439 PPPS, SUBSEQ VISIT: HCPCS | Performed by: INTERNAL MEDICINE

## 2022-06-29 PROCEDURE — 36415 COLL VENOUS BLD VENIPUNCTURE: CPT | Performed by: INTERNAL MEDICINE

## 2022-06-29 PROCEDURE — 1123F ACP DISCUSS/DSCN MKR DOCD: CPT | Performed by: INTERNAL MEDICINE

## 2022-06-29 PROCEDURE — 99213 OFFICE O/P EST LOW 20 MIN: CPT | Performed by: INTERNAL MEDICINE

## 2022-06-29 RX ORDER — TIZANIDINE 2 MG/1
2 TABLET ORAL EVERY 8 HOURS PRN
Qty: 30 TABLET | Refills: 0 | Status: SHIPPED | OUTPATIENT
Start: 2022-06-29 | End: 2022-07-12 | Stop reason: SINTOL

## 2022-06-29 RX ORDER — PANTOPRAZOLE SODIUM 40 MG/1
40 TABLET, DELAYED RELEASE ORAL
Qty: 90 TABLET | Refills: 3 | Status: SHIPPED | OUTPATIENT
Start: 2022-06-29

## 2022-06-29 RX ORDER — CALCIUM CARBONATE/VITAMIN D3 600 MG-20
TABLET ORAL
Qty: 90 CAPSULE | Refills: 3 | Status: SHIPPED | OUTPATIENT
Start: 2022-06-29

## 2022-06-29 RX ORDER — MELOXICAM 15 MG/1
15 TABLET ORAL DAILY
Qty: 90 TABLET | Refills: 3 | Status: SHIPPED | OUTPATIENT
Start: 2022-06-29 | End: 2022-09-27

## 2022-06-29 RX ORDER — ALBUTEROL SULFATE 90 UG/1
2 AEROSOL, METERED RESPIRATORY (INHALATION) EVERY 6 HOURS PRN
Qty: 1 EACH | Refills: 1 | Status: SHIPPED | OUTPATIENT
Start: 2022-06-29

## 2022-06-29 SDOH — ECONOMIC STABILITY: FOOD INSECURITY: WITHIN THE PAST 12 MONTHS, THE FOOD YOU BOUGHT JUST DIDN'T LAST AND YOU DIDN'T HAVE MONEY TO GET MORE.: NEVER TRUE

## 2022-06-29 SDOH — ECONOMIC STABILITY: FOOD INSECURITY: WITHIN THE PAST 12 MONTHS, YOU WORRIED THAT YOUR FOOD WOULD RUN OUT BEFORE YOU GOT MONEY TO BUY MORE.: NEVER TRUE

## 2022-06-29 ASSESSMENT — SOCIAL DETERMINANTS OF HEALTH (SDOH): HOW HARD IS IT FOR YOU TO PAY FOR THE VERY BASICS LIKE FOOD, HOUSING, MEDICAL CARE, AND HEATING?: NOT HARD AT ALL

## 2022-06-29 ASSESSMENT — PATIENT HEALTH QUESTIONNAIRE - PHQ9
SUM OF ALL RESPONSES TO PHQ9 QUESTIONS 1 & 2: 0
SUM OF ALL RESPONSES TO PHQ QUESTIONS 1-9: 0
SUM OF ALL RESPONSES TO PHQ QUESTIONS 1-9: 0
2. FEELING DOWN, DEPRESSED OR HOPELESS: 0
SUM OF ALL RESPONSES TO PHQ QUESTIONS 1-9: 0
SUM OF ALL RESPONSES TO PHQ QUESTIONS 1-9: 0
1. LITTLE INTEREST OR PLEASURE IN DOING THINGS: 0

## 2022-06-29 ASSESSMENT — LIFESTYLE VARIABLES: HOW OFTEN DO YOU HAVE A DRINK CONTAINING ALCOHOL: NEVER

## 2022-06-29 NOTE — PATIENT INSTRUCTIONS
Personalized Preventive Plan for Eloisa Lyn - 6/29/2022  Medicare offers a range of preventive health benefits. Some of the tests and screenings are paid in full while other may be subject to a deductible, co-insurance, and/or copay. Some of these benefits include a comprehensive review of your medical history including lifestyle, illnesses that may run in your family, and various assessments and screenings as appropriate. After reviewing your medical record and screening and assessments performed today your provider may have ordered immunizations, labs, imaging, and/or referrals for you. A list of these orders (if applicable) as well as your Preventive Care list are included within your After Visit Summary for your review. Other Preventive Recommendations:    · A preventive eye exam performed by an eye specialist is recommended every 1-2 years to screen for glaucoma; cataracts, macular degeneration, and other eye disorders. · A preventive dental visit is recommended every 6 months. · Try to get at least 150 minutes of exercise per week or 10,000 steps per day on a pedometer . · Order or download the FREE \"Exercise & Physical Activity: Your Everyday Guide\" from [employer]. Call 0-878.502.6178 or search [employer] online. · You need 7001-0064 mg of calcium and 2566-4914 IU of vitamin D per day. It is possible to meet your calcium requirement with diet alone, but a vitamin D supplement is usually necessary to meet this goal.  · When exposed to the sun, use a sunscreen that protects against both UVA and UVB radiation with an SPF of 30 or greater. Reapply every 2 to 3 hours or after sweating, drying off with a towel, or swimming. · Always wear a seat belt when traveling in a car. Always wear a helmet when riding a bicycle or motorcycle.

## 2022-06-29 NOTE — PROGRESS NOTES
Winston Cardenas  YOB: 1935    Date of Service:  6/29/2022    Chief Complaint:      Chief Complaint   Patient presents with    Medicare AWV       Assessment/Plan:  Jamey Avitia was seen today for medicare awv. Diagnoses and all orders for this visit:    Medicare annual wellness visit, subsequent  -     Full code    Left arm pain  Start tiZANidine (ZANAFLEX) 2 MG tablet; Take 1 tablet by mouth every 8 hours as needed (pain)  -     Comprehensive Metabolic Panel  -     CBC with Auto Differential    Blood per rectum  -     pantoprazole (PROTONIX) 40 MG tablet; Take 1 tablet by mouth every morning (before breakfast)    Primary osteoarthritis of both knees  -     meloxicam (MOBIC) 15 MG tablet; Take 1 tablet by mouth daily    Vitamin D deficiency  -     Cholecalciferol (CVS D3) 50 MCG (2000 UT) CAPS; TAKE 1 CAPSULE BY MOUTH EVERY DAY    Mild intermittent asthma without complication  -     albuterol sulfate HFA (PROVENTIL;VENTOLIN;PROAIR) 108 (90 Base) MCG/ACT inhaler; Inhale 2 puffs into the lungs every 6 hours as needed for Wheezing or Shortness of Breath      Return VV 2-4 weeks on left arm pain and neck pain. HPI:  Winston Cardenas is a 80 y.o. She complains of increase pain in her left arm for years and also has carpal tunnel syndrome along with right neck pain. Cervicalgia:  Stable and uses Tylenol prn  Bilateral knee OA s/p Right TKR: Stable on Mobic 15 mg daily, Tylenol doesn't help much. Celebrex is too expensive. H/O rectal bleed:  Stable on Protonix 40 mg qd  Chronic Constipation:  stable on otc Senna and Milk of Mag. Colace didn't work. Anemia:  Still on Iron qod.     Parkinson:  Stable on Sinemet 25/100 mg bid. Hypertension:  Home blood pressure monitoring: Yes - 118-125/65-74 off Lotrel 5/20 mg qd.   She is adherent to a low sodium diet. Patient denies chest pain, shortness of breath, headache, lightheadedness, blurred vision, peripheral edema, palpitations, dry cough and fatigue.  Antihypertensive medication side effects: no medication side effects noted.  Use of agents associated with hypertension: none.      Lab Results   Component Value Date    LABA1C 5.2 02/25/2020    LABMICR YES 06/27/2020     Lab Results   Component Value Date     06/10/2021    K 4.3 06/10/2021     06/10/2021    CO2 23 06/10/2021    BUN 23 (H) 06/10/2021    CREATININE 0.7 06/10/2021    GLUCOSE 88 06/10/2021    CALCIUM 9.3 06/10/2021     Lab Results   Component Value Date    CHOL 166 06/10/2021    TRIG 49 06/10/2021    HDL 53 06/10/2021    LDLCALC 103 06/10/2021     Lab Results   Component Value Date    ALT 7 (L) 06/10/2021    AST 11 (L) 06/10/2021     Lab Results   Component Value Date    TSH 0.57 05/19/2015    TSH 1.18 10/06/2011    T4FREE 1.05 10/06/2011    T4FREE 1.05 10/06/2011     Lab Results   Component Value Date    WBC 5.6 06/10/2021    HGB 12.3 06/10/2021    HCT 36.9 06/10/2021    MCV 90.3 06/10/2021     06/10/2021     Lab Results   Component Value Date    INR 1.04 06/27/2020    INR 0.98 02/25/2020      No results found for: PSA   No results found for: LABURIC     Patient Active Problem List   Diagnosis    Carpal tunnel syndrome of left wrist    Mild intermittent asthma    Goiter    Vitamin D deficiency    Staghorn calculus    Biceps rupture, proximal    Incomplete tear of right rotator cuff    History of nonadherence to medical treatment    B12 deficiency    Primary osteoarthritis of both knees    Irritable bowel syndrome with constipation    Environmental and seasonal allergies    Weakness generalized    Status post total right knee replacement    S/P total knee arthroplasty, right    Moderate malnutrition ([address]    Blood per rectum    BRBPR (bright red blood per rectum)    Acute blood loss anemia    Parkinson disease, symptomatic (HCC)    NSAID long-term use    Acute pain of right shoulder    Parkinsonian tremor (HCC)    HTN (hypertension), benign    Iron deficiency anemia    Sensorineural hearing loss, bilateral    History of GI bleed    Numbness    Polyneuropathy    Demyelinating disease ([address]    MS (multiple sclerosis) ([facility]       Allergies   Allergen Reactions    Hydrochlorothiazide      Constipation and severe leg cramps     Outpatient Medications Marked as Taking for the 6/29/22 encounter (Office Visit) with Reymundo Bach MD   Medication Sig Dispense Refill    carbidopa-levodopa (SINEMET)  MG per tablet TAKE 1 TABLET BY MOUTH THREE TIMES A  tablet 1    Cholecalciferol (CVS D3) 50 MCG (2000 UT) CAPS TAKE 1 CAPSULE BY MOUTH EVERY DAY 90 capsule 2    pantoprazole (PROTONIX) 40 MG tablet Take 1 tablet by mouth every morning (before breakfast) 90 tablet 3    Lift Chair MISC by Does not apply route 1 each 0    UNABLE TO [facility] bed for patient home use. 1 each 0    acetaminophen (TYLENOL) 500 MG tablet Take 500 mg by mouth daily      albuterol sulfate  (90 Base) MCG/ACT inhaler Inhale 2 puffs into the lungs every 6 hours as needed for Wheezing 1 Inhaler 1    SENNA CO Take 1 tablet by mouth as needed       magnesium hydroxide (MILK OF MAGNESIA) 400 MG/5ML suspension Take 30 mLs by mouth daily as needed for Constipation 1 Bottle 0         Review of Systems: 14 systems were negative except of what was stated on HPI    Nursing note and vitals reviewed. Vitals:    06/29/22 1325   BP: 104/66   Pulse: 82   SpO2: 99%   Weight: 103 lb (46.7 kg)   Height: 5' 7\" (1.702 m)     Wt Readings from Last 3 Encounters:   06/29/22 103 lb (46.7 kg)   07/22/21 126 lb (57.2 kg)   06/10/21 126 lb (57.2 kg)     BP Readings from Last 3 Encounters:   06/29/22 104/66   03/31/22 (!) 148/94   07/22/21 121/72     Body mass index is 16.13 kg/m². Constitutional: Patient appears well-developed and well-nourished. No distress. Head: Normocephalic and atraumatic. Neck: Normal range of motion. Neck supple. No thyroidmegaly.    Cardiovascular: Normal rate, regular rhythm, normal heart sounds and intact distal pulses. Pulmonary/Chest: Effort normal and breath sounds normal. No stridor. No respiratory distress. No wheezes and no rales. Abdominal: Soft. Bowel sounds are normal. No distension and no mass. No tenderness. No rebound and no guarding. Musculoskeletal: No edema and no tenderness. Skin: No rash or erythema.

## 2022-06-29 NOTE — PROGRESS NOTES
Medicare Annual Wellness Visit    Claribel Bowles is here for Medicare AWV    Assessment & Plan   Medicare annual wellness visit, subsequent  -     Full code      Recommendations for Preventive Services Due: see orders and patient instructions/AVS.  Recommended screening schedule for the next 5-10 years is provided to the patient in written form: see Patient Instructions/AVS.     No follow-ups on file. Subjective       Patient's complete Health Risk Assessment and screening values have been reviewed and are found in Flowsheets. The following problems were reviewed today and where indicated follow up appointments were made and/or referrals ordered. Positive Risk Factor Screenings with Interventions:    Fall Risk:  Do you feel unsteady or are you worried about falling? : (!) yes (uses wheelchair)  2 or more falls in past year?: no  Fall with injury in past year?: no     Fall Risk Interventions:    ·             General Health and ACP:  General  In general, how would you say your health is?: (!) Poor  In the past 7 days, have you experienced any of the following: New or Increased Pain, New or Increased Fatigue, Loneliness, Social Isolation, Stress or Anger?: No  Do you get the social and emotional support that you need?: (!) No (doesn't have much family around.  relies on grandson.)  Do you have a Living Will?: (!) No (needs to make one)    Advance Directives     Power of [address] Will ACP-Advance Directive ACP-Power of     Not on File Not on File Not on File Not on File      General Health Risk Interventions:  · Full Code    Health Habits/Nutrition:     Physical Activity: Inactive    Days of Exercise per Week: 0 days    Minutes of Exercise per Session: 0 min     Have you lost any weight without trying in the past 3 months?: No  Body mass index: (!) 16.13  Have you seen the dentist within the past year?: Yes    Health Habits/Nutrition Interventions:  ·       ADLs:  In the past 7 days, did you need help from others to perform any of the following everyday activities: Eating, dressing, grooming, bathing, toileting, or walking/balance?: (!) Yes  Select all that apply: Danae Hensley (grandson is caregiver)  In the past 7 days, did you need help from others to take care of any of the following: Laundry, housekeeping, banking/finances, shopping, telephone use, food preparation, transportation, or taking medications?: (!) Yes  Select all that apply: (!) Laundry,Housekeeping,Banking/Finances,Shopping,Food Preparation,Transportation (grandson is caregiver)    ADL Interventions:  ·           Objective   Vitals:    06/29/22 1325   BP: 104/66   Pulse: 82   SpO2: 99%   Weight: 103 lb (46.7 kg)   Height: 5' 7\" (1.702 m)      Body mass index is 16.13 kg/m². Allergies   Allergen Reactions    Hydrochlorothiazide      Constipation and severe leg cramps     Prior to Visit Medications    Medication Sig Taking? Authorizing Provider   carbidopa-levodopa (SINEMET)  MG per tablet TAKE 1 TABLET BY MOUTH THREE TIMES A DAY Yes Rasta Polanco MD   Cholecalciferol (CVS D3) 50 MCG (2000 UT) CAPS TAKE 1 CAPSULE BY MOUTH EVERY DAY Yes Diane Harris MD   pantoprazole (PROTONIX) 40 MG tablet Take 1 tablet by mouth every morning (before breakfast) Yes Gwen Harris MD   Lift Chair MISC by Does not apply route Yes Marcia Ramirez MD   [id number] [facility] bed for patient home use.  Yes Diane Harris MD   acetaminophen (TYLENOL) 500 MG tablet Take 500 mg by mouth daily Yes Historical Provider, MD   albuterol sulfate  (90 Base) MCG/ACT inhaler Inhale 2 puffs into the lungs every 6 hours as needed for Wheezing Yes Diane Harris MD   SENNA CO Take 1 tablet by mouth as needed  Yes Historical Provider, MD   magnesium hydroxide (MILK OF MAGNESIA) 400 MG/5ML suspension Take 30 mLs by mouth daily as needed for Constipation Yes Diane Harris MD   meloxicam (MOBIC) 15 MG tablet Take 1 tablet by mouth daily  Jason Harris MD       [facility] (Including outside providers/suppliers regularly involved in providing care):   Patient Care Team:  Janelle Wagoner MD as PCP - General (Internal Medicine)  Janelle Wagoner MD as PCP - [facility] Empaneled Provider     Reviewed and updated this visit:  Tobacco  Allergies  Meds  Problems  Med Hx  Surg Hx  Soc Hx  Fam Hx

## 2022-06-30 LAB
A/G RATIO: 1.7 (ref 1.1–2.2)
ALBUMIN SERPL-MCNC: 4.1 G/DL (ref 3.4–5)
ALP BLD-CCNC: 116 U/L (ref 40–129)
ALT SERPL-CCNC: 10 U/L (ref 10–40)
ANION GAP SERPL CALCULATED.3IONS-SCNC: 17 MMOL/L (ref 3–16)
AST SERPL-CCNC: 14 U/L (ref 15–37)
BASOPHILS ABSOLUTE: 0 K/UL (ref 0–0.2)
BASOPHILS RELATIVE PERCENT: 0.9 %
BILIRUB SERPL-MCNC: 0.7 MG/DL (ref 0–1)
BUN BLDV-MCNC: 21 MG/DL (ref 7–20)
CALCIUM SERPL-MCNC: 9.5 MG/DL (ref 8.3–10.6)
CHLORIDE BLD-SCNC: 101 MMOL/L (ref 99–110)
CO2: 22 MMOL/L (ref 21–32)
CREAT SERPL-MCNC: 0.8 MG/DL (ref 0.6–1.2)
EOSINOPHILS ABSOLUTE: 0 K/UL (ref 0–0.6)
EOSINOPHILS RELATIVE PERCENT: 0.8 %
GFR AFRICAN AMERICAN: >60
GFR NON-AFRICAN AMERICAN: >60
GLUCOSE BLD-MCNC: 78 MG/DL (ref 70–99)
HCT VFR BLD CALC: 39 % (ref 36–48)
HEMOGLOBIN: 13.2 G/DL (ref 12–16)
LYMPHOCYTES ABSOLUTE: 0.8 K/UL (ref 1–5.1)
LYMPHOCYTES RELATIVE PERCENT: 18.1 %
MCH RBC QN AUTO: 30.2 PG (ref 26–34)
MCHC RBC AUTO-ENTMCNC: 33.8 G/DL (ref 31–36)
MCV RBC AUTO: 89.5 FL (ref 80–100)
MONOCYTES ABSOLUTE: 0.4 K/UL (ref 0–1.3)
MONOCYTES RELATIVE PERCENT: 8.5 %
NEUTROPHILS ABSOLUTE: 3.2 K/UL (ref 1.7–7.7)
NEUTROPHILS RELATIVE PERCENT: 71.7 %
PDW BLD-RTO: 13.2 % (ref 12.4–15.4)
PLATELET # BLD: 153 K/UL (ref 135–450)
PMV BLD AUTO: 9.5 FL (ref 5–10.5)
POTASSIUM SERPL-SCNC: 4 MMOL/L (ref 3.5–5.1)
RBC # BLD: 4.36 M/UL (ref 4–5.2)
SODIUM BLD-SCNC: 140 MMOL/L (ref 136–145)
TOTAL PROTEIN: 6.5 G/DL (ref 6.4–8.2)
WBC # BLD: 4.5 K/UL (ref 4–11)

## 2022-07-12 ENCOUNTER — TELEMEDICINE (OUTPATIENT)
Dept: INTERNAL MEDICINE CLINIC | Age: 87
End: 2022-07-12
Payer: COMMERCIAL

## 2022-07-12 DIAGNOSIS — M79.602 LEFT ARM PAIN: Primary | ICD-10-CM

## 2022-07-12 DIAGNOSIS — M54.2 CERVICALGIA: ICD-10-CM

## 2022-07-12 PROCEDURE — 1123F ACP DISCUSS/DSCN MKR DOCD: CPT | Performed by: INTERNAL MEDICINE

## 2022-07-12 PROCEDURE — 99213 OFFICE O/P EST LOW 20 MIN: CPT | Performed by: INTERNAL MEDICINE

## 2022-07-12 RX ORDER — LIDOCAINE 50 MG/G
1 PATCH TOPICAL DAILY
Qty: 30 PATCH | Refills: 0 | Status: SHIPPED | OUTPATIENT
Start: 2022-07-12 | End: 2022-08-11

## 2022-07-12 NOTE — PROGRESS NOTES
Pursuant to the emergency declaration under the [facility] waiver authority and the [employer] and [employer] Act, this Virtual  Video Visit was conducted, with patient's consent, to reduce the patient's risk of exposure to COVID-19 and provide continuity of care. Service is  provided through a video synchronous discussion virtually to substitute for in-person clinic visit with the patient being at home and Dr. Bhumika Mann being at home. Patient consent to the video visit. Date of Service:  7/12/2022    Chief Complaint:      Chief Complaint   Patient presents with    Arm Pain    Neck Pain       Assessment/Plan:    Marta Hernandez was seen today for arm pain and neck pain. Diagnoses and all orders for this visit:    Left arm pain  -     lidocaine (LIDODERM) 5 %; Place 1 patch onto the skin daily 12 hours on, 12 hours off. Cervicalgia  -     lidocaine (LIDODERM) 5 %; Place 1 patch onto the skin daily 12 hours on, 12 hours off.    discontinue zanaflex and continue mobic daily and Tylenol prn     Return VV Dec 6 at 11:20 Neck, hand pain. HPI:  Di He is a 80 y.o. She complains of increase pain in her left arm for years and also has carpal tunnel syndrome along with right neck pain. She can't take Zanaflex due to sedation   Cervicalgia:  Stable and uses Tylenol prn  Bilateral knee OA s/p Right TKR: Stable on Mobic 15 mg daily, Tylenol doesn't help much.  Celebrex is too expensive. H/O rectal bleed:  Stable on Protonix 40 mg qd  Chronic Constipation:  stable on otc Senna and Milk of Mag. Colace didn't work. Anemia:  Still on Iron every other day and will get off once done if eating well.     Parkinson:  Stable on Sinemet 25/100 mg bid. Hypertension:  Home blood pressure monitoring: Yes - [id number] SANIA JSAUNY 5/20 mg qd.  She is adherent to a low sodium diet.  Patient denies chest pain, shortness of breath, headache, lightheadedness, blurred vision, peripheral edema, palpitations, dry cough and fatigue.  Antihypertensive medication side effects: no medication side effects noted.  Use of agents associated with hypertension: none.      Lab Results   Component Value Date    LABA1C 5.2 02/25/2020    LABMICR YES 06/27/2020     Lab Results   Component Value Date     06/29/2022    K 4.0 06/29/2022     06/29/2022    CO2 22 06/29/2022    BUN 21 (H) 06/29/2022    CREATININE 0.8 06/29/2022    GLUCOSE 78 06/29/2022    CALCIUM 9.5 06/29/2022     Lab Results   Component Value Date/Time    CHOL 166 06/10/2021 10:57 AM    TRIG 49 06/10/2021 10:57 AM    HDL 53 06/10/2021 10:57 AM    LDLCALC 103 06/10/2021 10:57 AM     Lab Results   Component Value Date    ALT 10 06/29/2022    AST 14 (L) 06/29/2022     Lab Results   Component Value Date    TSH 0.57 05/19/2015    TSH 1.18 10/06/2011    T4FREE 1.05 10/06/2011    T4FREE 1.05 10/06/2011     Lab Results   Component Value Date    WBC 4.5 06/29/2022    HGB 13.2 06/29/2022    HCT 39.0 06/29/2022    MCV 89.5 06/29/2022     06/29/2022     Lab Results   Component Value Date    INR 1.04 06/27/2020    INR 0.98 02/25/2020      No results found for: PSA   No results found for: [facility]     Patient Active Problem List   Diagnosis    Carpal tunnel syndrome of left wrist    Mild intermittent asthma    Goiter    Vitamin D deficiency    Staghorn calculus    Biceps rupture, proximal    Incomplete tear of right rotator cuff    History of nonadherence to medical treatment    B12 deficiency    Primary osteoarthritis of both knees    Irritable bowel syndrome with constipation    Environmental and seasonal allergies    Weakness generalized    Status post total right knee replacement    S/P total knee arthroplasty, right    Moderate malnutrition ([address]    Blood per rectum    BRBPR (bright red blood per rectum)    Acute blood loss anemia    Parkinson disease, symptomatic (HCC)  NSAID long-term use    Acute pain of right shoulder    Parkinsonian tremor (HCC)    HTN (hypertension), benign    Iron deficiency anemia    Sensorineural hearing loss, bilateral    History of GI bleed    Numbness    Polyneuropathy    Demyelinating disease ([facility] [address] [facility]    MS (multiple sclerosis) (HCC)       Allergies   Allergen Reactions    Hydrochlorothiazide      Constipation and severe leg cramps     Outpatient Medications Marked as Taking for the 7/12/22 encounter (Telemedicine) with Andrzej Harris MD   Medication Sig Dispense Refill    tiZANidine (ZANAFLEX) 2 MG tablet Take 1 tablet by mouth every 8 hours as needed (pain) 30 tablet 0    pantoprazole (PROTONIX) 40 MG tablet Take 1 tablet by mouth every morning (before breakfast) 90 tablet 3    meloxicam (MOBIC) 15 MG tablet Take 1 tablet by mouth daily 90 tablet 3    Cholecalciferol (CVS D3) 50 MCG (2000 UT) CAPS TAKE 1 CAPSULE BY MOUTH EVERY DAY 90 capsule 3    albuterol sulfate HFA (PROVENTIL;VENTOLIN;PROAIR) 108 (90 Base) MCG/ACT inhaler Inhale 2 puffs into the lungs every 6 hours as needed for Wheezing or Shortness of Breath 1 each 1    carbidopa-levodopa (SINEMET)  MG per tablet TAKE 1 TABLET BY MOUTH THREE TIMES A  tablet 1    Lift Chair MISC by Does not apply route 1 each 0    UNABLE TO [facility] bed for patient home use. 1 each 0    acetaminophen (TYLENOL) 500 MG tablet Take 500 mg by mouth daily      SENNA CO Take 1 tablet by mouth as needed       magnesium hydroxide (MILK OF MAGNESIA) 400 MG/5ML suspension Take 30 mLs by mouth daily as needed for Constipation 1 Bottle 0         Review of Systems: 14 systems were negative except of what was stated on HPI    Nursing note and vitals reviewed. There were no vitals filed for this visit.   Wt Readings from Last 3 Encounters:   06/29/22 103 lb (46.7 kg)   07/22/21 126 lb (57.2 kg)   06/10/21 126 lb (57.2 kg)     BP Readings from Last 3 Encounters:   06/29/22 104/66   03/31/22 (!) 148/94   07/22/21 121/72     There is no height or weight on file to calculate BMI. Constitutional: Patient appears well-developed and well-nourished. No distress. Head: Normocephalic and atraumatic. Psychiatric: Normal mood and affect.  Behavior is normal.

## 2022-07-13 ENCOUNTER — TELEPHONE (OUTPATIENT)
Dept: ADMINISTRATIVE | Age: 87
End: 2022-07-13

## 2022-07-13 NOTE — TELEPHONE ENCOUNTER
Submitted PA for Lidocaine 5% patches, Key: [id number]. Medication has been DENIED. Denial letter attached. Please notify patient. Thank you.

## 2022-07-18 NOTE — TELEPHONE ENCOUNTER
Informed grandson about authorization denial for lidoderm patches.  He is aware and will but OTC  or use GoodRx coupon

## 2022-08-29 ENCOUNTER — OFFICE VISIT (OUTPATIENT)
Dept: INTERNAL MEDICINE CLINIC | Age: 87
End: 2022-08-29
Payer: COMMERCIAL

## 2022-08-29 VITALS
WEIGHT: 103 LBS | DIASTOLIC BLOOD PRESSURE: 82 MMHG | HEART RATE: 72 BPM | HEIGHT: 67 IN | BODY MASS INDEX: 16.17 KG/M2 | SYSTOLIC BLOOD PRESSURE: 124 MMHG | OXYGEN SATURATION: 91 %

## 2022-08-29 DIAGNOSIS — Z65.8 FEELING LONELY: ICD-10-CM

## 2022-08-29 DIAGNOSIS — R53.1 WEAKNESS GENERALIZED: Primary | ICD-10-CM

## 2022-08-29 DIAGNOSIS — I10 ESSENTIAL HYPERTENSION: ICD-10-CM

## 2022-08-29 PROCEDURE — 1123F ACP DISCUSS/DSCN MKR DOCD: CPT | Performed by: INTERNAL MEDICINE

## 2022-08-29 PROCEDURE — 99213 OFFICE O/P EST LOW 20 MIN: CPT | Performed by: INTERNAL MEDICINE

## 2022-08-29 NOTE — PROGRESS NOTES
Payal Ren  YOB: 1935    Date of Service:  8/29/2022    Chief Complaint:      Chief Complaint   Patient presents with    Forms     One form for a Full Time live in aide (grandson will find aide)  One form for an emotional support animal       Assessment/Plan:  Ute Abernathy was seen today for forms. Diagnoses and all orders for this visit:    Weakness generalized    Essential hypertension    Feeling lonely    Letter written to keep parakeet as a   Stable and continue on current medications. Return move 12/6 to 12/13 at 9:40. HPI:  Payal Ren is a 80 y.o. She has a parakeete for 2 years as a  and she enjoys talking to it. Her grandson will be going back to school full time and will not be around as often so he will try to get an aid for her since she doesn't sleep well and may need help with getting up.     Lab Results   Component Value Date    LABA1C 5.2 02/25/2020    LABMICR YES 06/27/2020     Lab Results   Component Value Date     06/29/2022    K 4.0 06/29/2022     06/29/2022    CO2 22 06/29/2022    BUN 21 (H) 06/29/2022    CREATININE 0.8 06/29/2022    GLUCOSE 78 06/29/2022    CALCIUM 9.5 06/29/2022     Lab Results   Component Value Date/Time    CHOL 166 06/10/2021 10:57 AM    TRIG 49 06/10/2021 10:57 AM    HDL 53 06/10/2021 10:57 AM    LDLCALC 103 06/10/2021 10:57 AM     Lab Results   Component Value Date    ALT 10 06/29/2022    AST 14 (L) 06/29/2022     Lab Results   Component Value Date    TSH 0.57 05/19/2015    TSH 1.18 10/06/2011    T4FREE 1.05 10/06/2011    T4FREE 1.05 10/06/2011     Lab Results   Component Value Date    WBC 4.5 06/29/2022    HGB 13.2 06/29/2022    HCT 39.0 06/29/2022    MCV 89.5 06/29/2022     06/29/2022     Lab Results   Component Value Date    INR 1.04 06/27/2020    INR 0.98 02/25/2020      No results found for: PSA   No results found for: [facility]     Patient Active Problem List   Diagnosis    Carpal tunnel syndrome of left wrist    Mild intermittent asthma    Goiter    Vitamin D deficiency    Staghorn calculus    Biceps rupture, proximal    Incomplete tear of right rotator cuff    History of nonadherence to medical treatment    B12 deficiency    Primary osteoarthritis of both knees    Irritable bowel syndrome with constipation    Environmental and seasonal allergies    Weakness generalized    Status post total right knee replacement    S/P total knee arthroplasty, right    Moderate malnutrition (HCC)    Blood per rectum    BRBPR (bright red blood per rectum)    Acute blood loss anemia    Parkinson disease, symptomatic (HCC)    NSAID long-term use    Acute pain of right shoulder    Parkinsonian tremor (HCC)    HTN (hypertension), benign    Iron deficiency anemia    Sensorineural hearing loss, bilateral    History of GI bleed    Numbness    Polyneuropathy    Demyelinating disease ([facility] [address]    MS (multiple sclerosis) (HCC)       Allergies   Allergen Reactions    Hydrochlorothiazide      Constipation and severe leg cramps     Outpatient Medications Marked as Taking for the 8/29/22 encounter (Office Visit) with Kathryn Harris MD   Medication Sig Dispense Refill    pantoprazole (PROTONIX) 40 MG tablet Take 1 tablet by mouth every morning (before breakfast) 90 tablet 3    meloxicam (MOBIC) 15 MG tablet Take 1 tablet by mouth daily 90 tablet 3    Cholecalciferol (CVS D3) 50 MCG (2000 UT) CAPS TAKE 1 CAPSULE BY MOUTH EVERY DAY 90 capsule 3    albuterol sulfate HFA (PROVENTIL;VENTOLIN;PROAIR) 108 (90 Base) MCG/ACT inhaler Inhale 2 puffs into the lungs every 6 hours as needed for Wheezing or Shortness of Breath 1 each 1    carbidopa-levodopa (SINEMET)  MG per tablet TAKE 1 TABLET BY MOUTH THREE TIMES A  tablet 1    Lift Chair MISC by Does not apply route 1 each 0    UNABLE TO [facility] for patient home use.  1 each 0    acetaminophen (TYLENOL) 500 MG tablet Take 500 mg by mouth daily      SENNA CO Take 1 tablet by mouth as needed       magnesium hydroxide (MILK OF MAGNESIA) 400 MG/5ML suspension Take 30 mLs by mouth daily as needed for Constipation 1 Bottle 0         Review of Systems: 14 systems were negative except of what was stated on HPI    Nursing note and vitals reviewed. Vitals:    08/29/22 1106   BP: 124/82   Pulse: 72   SpO2: 91%   Weight: 103 lb (46.7 kg)   Height: 5' 6.5\" (1.689 m)     Wt Readings from Last 3 Encounters:   08/29/22 103 lb (46.7 kg)   06/29/22 103 lb (46.7 kg)   07/22/21 126 lb (57.2 kg)     BP Readings from Last 3 Encounters:   08/29/22 124/82   06/29/22 104/66   03/31/22 (!) 148/94     Body mass index is 16.38 kg/m². Constitutional: Patient appears well-developed and well-nourished. No distress. Head: Normocephalic and atraumatic. Neck: Normal range of motion. Neck supple. No thyroidmegaly. Cardiovascular: Normal rate, regular rhythm, normal heart sounds and intact distal pulses. Pulmonary/Chest: Effort normal and breath sounds normal. No stridor. No respiratory distress. No wheezes and no rales.

## 2022-08-29 NOTE — LETTER
[address] [address]  Phone: [phone]  Fax: [phone]    Gloria Dominique MD        August 29, 2022     Patient: Lloyd Medel   YOB: 1935   Date of Visit: 8/29/2022       To Whom It May Concern: It is my medical opinion that Iris Eagle would benefit from having a Parakeete as a . If you have any questions or concerns, please don't hesitate to call. Sincerely,    .  .     Gloria Dominique MD

## 2022-10-04 DIAGNOSIS — G20 PARKINSON DISEASE, SYMPTOMATIC (HCC): ICD-10-CM

## 2022-12-06 ENCOUNTER — TELEPHONE (OUTPATIENT)
Dept: INTERNAL MEDICINE CLINIC | Age: 87
End: 2022-12-06

## 2022-12-06 NOTE — TELEPHONE ENCOUNTER
Dina Alford asked me to fax Demographic and last PE (AWV-6-29-22)  to Deven [facility] at the nursing home.   Done

## 2022-12-12 ENCOUNTER — TELEPHONE (OUTPATIENT)
Dept: INTERNAL MEDICINE CLINIC | Age: 87
End: 2022-12-12

## 2022-12-12 NOTE — TELEPHONE ENCOUNTER
Marcelo Rutherford from [facility] requesting DEMO, and last progress note faxed to them for admission purposes @ [phone].   Done

## 2022-12-13 ENCOUNTER — TELEMEDICINE (OUTPATIENT)
Dept: INTERNAL MEDICINE CLINIC | Age: 87
End: 2022-12-13
Payer: COMMERCIAL

## 2022-12-13 DIAGNOSIS — E55.9 VITAMIN D DEFICIENCY: Primary | ICD-10-CM

## 2022-12-13 DIAGNOSIS — R53.1 WEAKNESS GENERALIZED: ICD-10-CM

## 2022-12-13 DIAGNOSIS — G20 PARKINSONIAN TREMOR (HCC): ICD-10-CM

## 2022-12-13 DIAGNOSIS — M17.0 PRIMARY OSTEOARTHRITIS OF BOTH KNEES: ICD-10-CM

## 2022-12-13 DIAGNOSIS — Z87.19 HISTORY OF RECTAL BLEEDING: ICD-10-CM

## 2022-12-13 PROCEDURE — 99214 OFFICE O/P EST MOD 30 MIN: CPT | Performed by: INTERNAL MEDICINE

## 2022-12-13 PROCEDURE — 1123F ACP DISCUSS/DSCN MKR DOCD: CPT | Performed by: INTERNAL MEDICINE

## 2022-12-13 RX ORDER — CALCIUM CARBONATE/VITAMIN D3 500MG-5MCG
500 TABLET ORAL DAILY
Qty: 90 TABLET | Refills: 1 | Status: SHIPPED | OUTPATIENT
Start: 2022-12-13

## 2022-12-13 NOTE — PROGRESS NOTES
Pursuant to the emergency declaration under the [facility] waiver authority and the [employer] and [employer] Act, this Virtual  Video Visit was conducted, with patient's consent, to reduce the patient's risk of exposure to COVID-19 and provide continuity of care. Service is  provided through a video synchronous discussion virtually to substitute for in-person clinic visit with the patient being at home and Dr. Mejia Petit being at home. Patient consent to the video visit. Date of Service:  12/13/2022    Chief Complaint:      Chief Complaint   Patient presents with    Pain     Neck and Hand pain follow up       Assessment/Plan:    Dany Sahni was seen today for pain. Diagnoses and all orders for this visit:    Vitamin D deficiency  -     Calcium Carb-Cholecalciferol (OYSTER SHELL CALCIUM PLUS D) 500-5 MG-MCG TABS; Take 500 mg by mouth daily    Primary osteoarthritis of both knees    Weakness generalized    Parkinsonian tremor (HCC)    History of rectal bleeding    Stable and continue on current medications. Return AWV and f/u 6/29. HPI:  Lucio Patrick is a 80 y.o.        Bilateral knee OA s/p Right TKR: Stable on Mobic 15 mg daily, Tylenol doesn't help much. Celebrex is too expensive. Cervicalgia:  Stable and uses Tylenol as needed. She can't take Zanaflex due to sedation   H/O rectal bleed:  Stable on Protonix 40 mg qd  Chronic Constipation:  stable on otc Senna and Milk of Mag. Colace didn't work. Anemia:  Still on Iron every other day and will get off once done if eating well. Parkinson:  Stable on Sinemet 25/100 mg bid. Hypertension:  Home blood pressure monitoring: Yes - 118-125/65-74 off Lotrel 5/20 mg qd. She is adherent to a low sodium diet. Patient denies chest pain, shortness of breath, headache, lightheadedness, blurred vision, peripheral edema, palpitations, dry cough and fatigue.   Antihypertensive medication side effects: no medication side effects noted. Use of agents associated with hypertension: none.       Lab Results   Component Value Date    LABA1C 5.2 02/25/2020    LABMICR YES 06/27/2020     Lab Results   Component Value Date     06/29/2022    K 4.0 06/29/2022     06/29/2022    CO2 22 06/29/2022    BUN 21 (H) 06/29/2022    CREATININE 0.8 06/29/2022    GLUCOSE 78 06/29/2022    CALCIUM 9.5 06/29/2022     Lab Results   Component Value Date/Time    CHOL 166 06/10/2021 10:57 AM    TRIG 49 06/10/2021 10:57 AM    HDL 53 06/10/2021 10:57 AM    LDLCALC 103 06/10/2021 10:57 AM     Lab Results   Component Value Date    ALT 10 06/29/2022    AST 14 (L) 06/29/2022     Lab Results   Component Value Date    TSH 0.57 05/19/2015    TSH 1.18 10/06/2011    T4FREE 1.05 10/06/2011    T4FREE 1.05 10/06/2011     Lab Results   Component Value Date    WBC 4.5 06/29/2022    HGB 13.2 06/29/2022    HCT 39.0 06/29/2022    MCV 89.5 06/29/2022     06/29/2022     Lab Results   Component Value Date    INR 1.04 06/27/2020    INR 0.98 02/25/2020      No results found for: PSA   No results found for: [facility]     Patient Active Problem List   Diagnosis    Carpal tunnel syndrome of left wrist    Mild intermittent asthma    Goiter    Vitamin D deficiency    Staghorn calculus    Biceps rupture, proximal    Incomplete tear of right rotator cuff    History of nonadherence to medical treatment    B12 deficiency    Primary osteoarthritis of both knees    Irritable bowel syndrome with constipation    Environmental and seasonal allergies    Weakness generalized    Status post total right knee replacement    S/P total knee arthroplasty, right    Moderate malnutrition (HCC)    Blood per rectum    BRBPR (bright red blood per rectum)    Acute blood loss anemia    Parkinson disease, symptomatic (HCC)    NSAID long-term use    Acute pain of right shoulder    Parkinsonian tremor (HCC)    HTN (hypertension), benign    Iron deficiency anemia    Sensorineural hearing loss, bilateral    History of GI bleed    Numbness    Polyneuropathy    Demyelinating disease (HCC)    MS (multiple sclerosis) (HCC)       Allergies   Allergen Reactions    Hydrochlorothiazide      Constipation and severe leg cramps     Outpatient Medications Marked as Taking for the 12/13/22 encounter (Telemedicine) with Jessica Harris MD   Medication Sig Dispense Refill    carbidopa-levodopa (SINEMET)  MG per tablet TAKE 1 TABLET BY MOUTH THREE TIMES A  tablet 1    pantoprazole (PROTONIX) 40 MG tablet Take 1 tablet by mouth every morning (before breakfast) 90 tablet 3    Cholecalciferol (CVS D3) 50 MCG (2000 UT) CAPS TAKE 1 CAPSULE BY MOUTH EVERY DAY 90 capsule 3    albuterol sulfate HFA (PROVENTIL;VENTOLIN;PROAIR) 108 (90 Base) MCG/ACT inhaler Inhale 2 puffs into the lungs every 6 hours as needed for Wheezing or Shortness of Breath 1 each 1    Lift Chair MISC by Does not apply route 1 each 0    UNABLE TO [facility] bed for patient home use. 1 each 0    acetaminophen (TYLENOL) 500 MG tablet Take 500 mg by mouth daily      SENNA CO Take 1 tablet by mouth as needed       magnesium hydroxide (MILK OF MAGNESIA) 400 MG/5ML suspension Take 30 mLs by mouth daily as needed for Constipation 1 Bottle 0         Review of Systems: 14 systems were negative except of what was stated on HPI    Nursing note and vitals reviewed. There were no vitals filed for this visit. Wt Readings from Last 3 Encounters:   08/29/22 103 lb (46.7 kg)   06/29/22 103 lb (46.7 kg)   07/22/21 126 lb (57.2 kg)     BP Readings from Last 3 Encounters:   08/29/22 124/82   06/29/22 104/66   03/31/22 (!) 148/94     There is no height or weight on file to calculate BMI. Constitutional: Patient appears well-developed and well-nourished. No distress. Head: Normocephalic and atraumatic. Psychiatric: Normal mood and affect.  Behavior is normal.

## 2022-12-29 ENCOUNTER — OFFICE VISIT (OUTPATIENT)
Dept: NEUROLOGY | Age: 87
End: 2022-12-29
Payer: COMMERCIAL

## 2022-12-29 VITALS
BODY MASS INDEX: 16.13 KG/M2 | SYSTOLIC BLOOD PRESSURE: 137 MMHG | HEIGHT: 67 IN | WEIGHT: 102.8 LBS | OXYGEN SATURATION: 92 % | HEART RATE: 65 BPM | DIASTOLIC BLOOD PRESSURE: 81 MMHG

## 2022-12-29 DIAGNOSIS — E44.0 MODERATE MALNUTRITION (HCC): ICD-10-CM

## 2022-12-29 DIAGNOSIS — G20 PARKINSON DISEASE, SYMPTOMATIC (HCC): Primary | ICD-10-CM

## 2022-12-29 DIAGNOSIS — G20 PARKINSONIAN TREMOR (HCC): ICD-10-CM

## 2022-12-29 DIAGNOSIS — I10 HTN (HYPERTENSION), BENIGN: ICD-10-CM

## 2022-12-29 PROCEDURE — 1123F ACP DISCUSS/DSCN MKR DOCD: CPT | Performed by: PSYCHIATRY & NEUROLOGY

## 2022-12-29 PROCEDURE — 99213 OFFICE O/P EST LOW 20 MIN: CPT | Performed by: PSYCHIATRY & NEUROLOGY

## 2022-12-29 NOTE — PROGRESS NOTES
The patient came today for follow up regarding: Parkinsonian tremors and Parkinson disease    No new changes since her last visit. She continues to have difficulties with ADL including walking with her walker giving generalized deconditioning and weakness for quite some time with her range of motion in both shoulders. She is on the same dose of Sinemet 3-4 times daily. No side effect. No recent fall or injury. No sleep insomnia or parasomnia. No recent weight changes. Number is intermittent but waxing and waning. No severe dementia or psychosis. She had PT and OT which did help her. Other review of system was unremarkable. Exam:   Constitutional:   Vitals:    12/29/22 1235   BP: 137/81   Site: Right Wrist   Position: Sitting   Pulse: 65   SpO2: 92%   Weight: 102 lb 12.8 oz (46.6 kg)   Height: 5' 7\" (1.702 m)       General appearance:  NAD. No chnages  Mental Status:   Oriented to person, place, problem, and time. Memory: Impaired immediate recall. Intact remote memory  Normal attention span and concentration. Language: intact naming, repeating and fluency but spastic speech  Good fund of Knowledge. Aware of current events and vocabulary   Cranial Nerves:   II: Pupils: equal, round, reactive to light  III,IV,VI: Extra Ocular Movements are intact. No nystagmus  V: Facial sensation is intact   VII: Facial strength and movements: intact and symmetric  XII: Tongue movements are normal  Musculoskeletal: Decreased rang of motion of both shoulder more right than left and decreased range of motion her neck muscles bilaterally. Resting tremors with pill-rolling tremors, cogwheeling rigidity in both arms. Poor REM.   No changes today  No sensory loss  Gait is slow and need assistant with getting up and walking      ROS : A 10-14 system review of constitutional, cardiovascular, respiratory, GI, eyes, , ENT, musculoskeletal, endocrine, hematological, skin, SHEENT, genitourinary, psychiatric and neurologic systems was obtained and updated today which is unremarkable except as mentioned in my HPI      Medical decision making:  I personally reviewed and updated social history, past medical history, medications, allergy, surgical history, and family history as documented in the patient's electronic health records. Labs and/or neuroimaging and other test results reviewed and discussed with the patient. From June of this year  Obtained history from her family  Reviewed notes from other physicians. Provided patient education regarding risk, benefits and treatment options as well as adherence to medication regimen and side effect from these medications. Assessment:     Diagnosis Orders   1. Parkinson disease, symptomatic ([address]  Ambulatory referral to Physical Therapy      2. Parkinsonian tremor ([address]        3. HTN (hypertension), benign        4.  Moderate malnutrition ([address]              Continue with Sinemet  mg tablets 4 times daily  Refill for Sinemet  PT and OT for gait training  Fall precautions  Side effect from Sinemet discussed  Continue with weight management and nutritional support  Multivitamin daily  Improving sleep hygiene  Blood pressure monitor at home closely  Discussed risk of falling with patient and family  Follow-up next year

## 2023-02-22 ENCOUNTER — TELEPHONE (OUTPATIENT)
Dept: NEUROLOGY | Age: 88
End: 2023-02-22

## 2023-02-22 NOTE — TELEPHONE ENCOUNTER
LOV: 12/29/22  NOV: 1/24/24    Althea Tejada from [facility] called today to request verbal orders for patient. Verbals orders for  2 times a week for 9 weeks for PT. Also wants a verbal order to add OT for eval and treat.      Micharadha Terrell says that you can leave a message  on her confidential voicemail at phone number [phone]

## 2023-02-24 NOTE — TELEPHONE ENCOUNTER
[facility] called back, gave verbal order from last office visit on 12-. They faxed over a form to be signed, on providers desk will fax when complete.

## 2023-03-12 ENCOUNTER — APPOINTMENT (OUTPATIENT)
Dept: GENERAL RADIOLOGY | Age: 88
DRG: 871 | End: 2023-03-12
Payer: COMMERCIAL

## 2023-03-12 ENCOUNTER — HOSPITAL ENCOUNTER (INPATIENT)
Age: 88
LOS: 3 days | Discharge: NURSING, REHAB OR LONG-TERM CARE | DRG: 871 | End: 2023-03-15
Attending: STUDENT IN AN ORGANIZED HEALTH CARE EDUCATION/TRAINING PROGRAM | Admitting: INTERNAL MEDICINE
Payer: COMMERCIAL

## 2023-03-12 ENCOUNTER — APPOINTMENT (OUTPATIENT)
Dept: CT IMAGING | Age: 88
DRG: 871 | End: 2023-03-12
Payer: COMMERCIAL

## 2023-03-12 DIAGNOSIS — R77.8 ELEVATED TROPONIN: ICD-10-CM

## 2023-03-12 DIAGNOSIS — R31.9 URINARY TRACT INFECTION WITH HEMATURIA, SITE UNSPECIFIED: ICD-10-CM

## 2023-03-12 DIAGNOSIS — N39.0 URINARY TRACT INFECTION WITH HEMATURIA, SITE UNSPECIFIED: ICD-10-CM

## 2023-03-12 DIAGNOSIS — J18.9 PNEUMONIA DUE TO INFECTIOUS ORGANISM, UNSPECIFIED LATERALITY, UNSPECIFIED PART OF LUNG: Primary | ICD-10-CM

## 2023-03-12 DIAGNOSIS — R74.01 TRANSAMINITIS: ICD-10-CM

## 2023-03-12 DIAGNOSIS — A41.9 SEPTICEMIA (HCC): ICD-10-CM

## 2023-03-12 PROBLEM — G93.41 ACUTE METABOLIC ENCEPHALOPATHY: Status: ACTIVE | Noted: 2023-03-12

## 2023-03-12 LAB
A/G RATIO: 1.4 (ref 1.1–2.2)
ALBUMIN SERPL-MCNC: 3.3 G/DL (ref 3.4–5)
ALP BLD-CCNC: 573 U/L (ref 40–129)
ALT SERPL-CCNC: 219 U/L (ref 10–40)
ANION GAP SERPL CALCULATED.3IONS-SCNC: 9 MMOL/L (ref 3–16)
AST SERPL-CCNC: 395 U/L (ref 15–37)
BASE EXCESS VENOUS: 2.7 MMOL/L (ref -3–3)
BASOPHILS ABSOLUTE: 0 K/UL (ref 0–0.2)
BASOPHILS RELATIVE PERCENT: 0.2 %
BILIRUB SERPL-MCNC: 1.7 MG/DL (ref 0–1)
BILIRUBIN URINE: ABNORMAL
BLOOD, URINE: ABNORMAL
BUN BLDV-MCNC: 24 MG/DL (ref 7–20)
CALCIUM SERPL-MCNC: 9 MG/DL (ref 8.3–10.6)
CARBOXYHEMOGLOBIN: 1.8 % (ref 0–1.5)
CHLORIDE BLD-SCNC: 95 MMOL/L (ref 99–110)
CLARITY: CLEAR
CO2: 27 MMOL/L (ref 21–32)
COLOR: YELLOW
CREAT SERPL-MCNC: 0.9 MG/DL (ref 0.6–1.2)
EOSINOPHILS ABSOLUTE: 0 K/UL (ref 0–0.6)
EOSINOPHILS RELATIVE PERCENT: 0.2 %
EPITHELIAL CELLS, UA: ABNORMAL /HPF (ref 0–5)
GFR SERPL CREATININE-BSD FRML MDRD: >60 ML/MIN/{1.73_M2}
GLUCOSE BLD-MCNC: 82 MG/DL (ref 70–99)
GLUCOSE URINE: NEGATIVE MG/DL
HCO3 VENOUS: 29 MMOL/L (ref 23–29)
HCT VFR BLD CALC: 39.3 % (ref 36–48)
HEMOGLOBIN: 12.8 G/DL (ref 12–16)
INFLUENZA A: NOT DETECTED
INFLUENZA B: NOT DETECTED
INR BLD: 1.32 (ref 0.87–1.14)
KETONES, URINE: NEGATIVE MG/DL
LACTIC ACID, SEPSIS: 1.4 MMOL/L (ref 0.4–1.9)
LEUKOCYTE ESTERASE, URINE: ABNORMAL
LIPASE: 12 U/L (ref 13–60)
LYMPHOCYTES ABSOLUTE: 0.3 K/UL (ref 1–5.1)
LYMPHOCYTES RELATIVE PERCENT: 5.6 %
MCH RBC QN AUTO: 30.2 PG (ref 26–34)
MCHC RBC AUTO-ENTMCNC: 32.6 G/DL (ref 31–36)
MCV RBC AUTO: 92.4 FL (ref 80–100)
METHEMOGLOBIN VENOUS: 0.5 %
MICROSCOPIC EXAMINATION: YES
MONOCYTES ABSOLUTE: 0.2 K/UL (ref 0–1.3)
MONOCYTES RELATIVE PERCENT: 4.1 %
MUCUS: ABNORMAL /LPF
NEUTROPHILS ABSOLUTE: 4.2 K/UL (ref 1.7–7.7)
NEUTROPHILS RELATIVE PERCENT: 89.9 %
NITRITE, URINE: NEGATIVE
O2 SAT, VEN: 61 %
O2 THERAPY: ABNORMAL
PCO2, VEN: 51.8 MMHG (ref 40–50)
PDW BLD-RTO: 14.3 % (ref 12.4–15.4)
PH UA: 5.5 (ref 5–8)
PH VENOUS: 7.37 (ref 7.35–7.45)
PLATELET # BLD: 118 K/UL (ref 135–450)
PMV BLD AUTO: 9.4 FL (ref 5–10.5)
PO2, VEN: 33.4 MMHG (ref 25–40)
POTASSIUM REFLEX MAGNESIUM: 3.8 MMOL/L (ref 3.5–5.1)
PROTEIN UA: 30 MG/DL
PROTHROMBIN TIME: 16.4 SEC (ref 11.7–14.5)
RBC # BLD: 4.25 M/UL (ref 4–5.2)
RBC UA: >100 /HPF (ref 0–4)
SARS-COV-2 RNA, RT PCR: NOT DETECTED
SODIUM BLD-SCNC: 131 MMOL/L (ref 136–145)
SPECIFIC GRAVITY UA: 1.02 (ref 1–1.03)
SPECIMEN STATUS: NORMAL
TCO2 CALC VENOUS: 31 MMOL/L
TOTAL PROTEIN: 5.6 G/DL (ref 6.4–8.2)
TROPONIN: 0.07 NG/ML
URINE TYPE: ABNORMAL
UROBILINOGEN, URINE: 2 E.U./DL
WBC # BLD: 4.7 K/UL (ref 4–11)
WBC UA: ABNORMAL /HPF (ref 0–5)

## 2023-03-12 PROCEDURE — 82803 BLOOD GASES ANY COMBINATION: CPT

## 2023-03-12 PROCEDURE — 84484 ASSAY OF TROPONIN QUANT: CPT

## 2023-03-12 PROCEDURE — 87150 DNA/RNA AMPLIFIED PROBE: CPT

## 2023-03-12 PROCEDURE — 74176 CT ABD & PELVIS W/O CONTRAST: CPT

## 2023-03-12 PROCEDURE — 72125 CT NECK SPINE W/O DYE: CPT

## 2023-03-12 PROCEDURE — 83605 ASSAY OF LACTIC ACID: CPT

## 2023-03-12 PROCEDURE — 96367 TX/PROPH/DG ADDL SEQ IV INF: CPT

## 2023-03-12 PROCEDURE — 71045 X-RAY EXAM CHEST 1 VIEW: CPT

## 2023-03-12 PROCEDURE — 81001 URINALYSIS AUTO W/SCOPE: CPT

## 2023-03-12 PROCEDURE — 2580000003 HC RX 258: Performed by: STUDENT IN AN ORGANIZED HEALTH CARE EDUCATION/TRAINING PROGRAM

## 2023-03-12 PROCEDURE — 80053 COMPREHEN METABOLIC PANEL: CPT

## 2023-03-12 PROCEDURE — 87636 SARSCOV2 & INF A&B AMP PRB: CPT

## 2023-03-12 PROCEDURE — 84443 ASSAY THYROID STIM HORMONE: CPT

## 2023-03-12 PROCEDURE — 96365 THER/PROPH/DIAG IV INF INIT: CPT

## 2023-03-12 PROCEDURE — 6370000000 HC RX 637 (ALT 250 FOR IP): Performed by: STUDENT IN AN ORGANIZED HEALTH CARE EDUCATION/TRAINING PROGRAM

## 2023-03-12 PROCEDURE — 83690 ASSAY OF LIPASE: CPT

## 2023-03-12 PROCEDURE — 85025 COMPLETE CBC W/AUTO DIFF WBC: CPT

## 2023-03-12 PROCEDURE — 87040 BLOOD CULTURE FOR BACTERIA: CPT

## 2023-03-12 PROCEDURE — 80074 ACUTE HEPATITIS PANEL: CPT

## 2023-03-12 PROCEDURE — 85610 PROTHROMBIN TIME: CPT

## 2023-03-12 PROCEDURE — 99285 EMERGENCY DEPT VISIT HI MDM: CPT

## 2023-03-12 PROCEDURE — 70450 CT HEAD/BRAIN W/O DYE: CPT

## 2023-03-12 PROCEDURE — 6360000002 HC RX W HCPCS: Performed by: STUDENT IN AN ORGANIZED HEALTH CARE EDUCATION/TRAINING PROGRAM

## 2023-03-12 PROCEDURE — 87186 SC STD MICRODIL/AGAR DIL: CPT

## 2023-03-12 PROCEDURE — 80143 DRUG ASSAY ACETAMINOPHEN: CPT

## 2023-03-12 PROCEDURE — 1200000000 HC SEMI PRIVATE

## 2023-03-12 RX ORDER — ONDANSETRON 2 MG/ML
4 INJECTION INTRAMUSCULAR; INTRAVENOUS EVERY 6 HOURS PRN
Status: DISCONTINUED | OUTPATIENT
Start: 2023-03-12 | End: 2023-03-15 | Stop reason: HOSPADM

## 2023-03-12 RX ORDER — ENOXAPARIN SODIUM 100 MG/ML
30 INJECTION SUBCUTANEOUS DAILY
Status: DISCONTINUED | OUTPATIENT
Start: 2023-03-13 | End: 2023-03-15 | Stop reason: HOSPADM

## 2023-03-12 RX ORDER — SENNA PLUS 8.6 MG/1
1 TABLET ORAL DAILY PRN
Status: DISCONTINUED | OUTPATIENT
Start: 2023-03-12 | End: 2023-03-15 | Stop reason: HOSPADM

## 2023-03-12 RX ORDER — SODIUM CHLORIDE 9 MG/ML
INJECTION, SOLUTION INTRAVENOUS CONTINUOUS
Status: ACTIVE | OUTPATIENT
Start: 2023-03-13 | End: 2023-03-13

## 2023-03-12 RX ORDER — SODIUM CHLORIDE 9 MG/ML
INJECTION, SOLUTION INTRAVENOUS PRN
Status: DISCONTINUED | OUTPATIENT
Start: 2023-03-12 | End: 2023-03-15 | Stop reason: HOSPADM

## 2023-03-12 RX ORDER — SODIUM CHLORIDE 0.9 % (FLUSH) 0.9 %
10 SYRINGE (ML) INJECTION PRN
Status: DISCONTINUED | OUTPATIENT
Start: 2023-03-12 | End: 2023-03-15 | Stop reason: HOSPADM

## 2023-03-12 RX ORDER — PANTOPRAZOLE SODIUM 40 MG/1
40 TABLET, DELAYED RELEASE ORAL
Status: DISCONTINUED | OUTPATIENT
Start: 2023-03-13 | End: 2023-03-15 | Stop reason: HOSPADM

## 2023-03-12 RX ORDER — IBUPROFEN 600 MG/1
600 TABLET ORAL
Status: COMPLETED | OUTPATIENT
Start: 2023-03-12 | End: 2023-03-12

## 2023-03-12 RX ORDER — SODIUM CHLORIDE 0.9 % (FLUSH) 0.9 %
10 SYRINGE (ML) INJECTION EVERY 12 HOURS SCHEDULED
Status: DISCONTINUED | OUTPATIENT
Start: 2023-03-13 | End: 2023-03-15 | Stop reason: HOSPADM

## 2023-03-12 RX ORDER — ONDANSETRON 4 MG/1
4 TABLET, ORALLY DISINTEGRATING ORAL EVERY 8 HOURS PRN
Status: DISCONTINUED | OUTPATIENT
Start: 2023-03-12 | End: 2023-03-15 | Stop reason: HOSPADM

## 2023-03-12 RX ORDER — 0.9 % SODIUM CHLORIDE 0.9 %
1000 INTRAVENOUS SOLUTION INTRAVENOUS ONCE
Status: COMPLETED | OUTPATIENT
Start: 2023-03-12 | End: 2023-03-12

## 2023-03-12 RX ADMIN — CEFTRIAXONE SODIUM 1000 MG: 1 INJECTION, POWDER, FOR SOLUTION INTRAMUSCULAR; INTRAVENOUS at 20:48

## 2023-03-12 RX ADMIN — AZITHROMYCIN MONOHYDRATE 500 MG: 500 INJECTION, POWDER, LYOPHILIZED, FOR SOLUTION INTRAVENOUS at 21:22

## 2023-03-12 RX ADMIN — IBUPROFEN 600 MG: 600 TABLET, FILM COATED ORAL at 23:10

## 2023-03-12 RX ADMIN — SODIUM CHLORIDE 1000 ML: 9 INJECTION, SOLUTION INTRAVENOUS at 20:12

## 2023-03-12 ASSESSMENT — PAIN - FUNCTIONAL ASSESSMENT: PAIN_FUNCTIONAL_ASSESSMENT: NONE - DENIES PAIN

## 2023-03-12 NOTE — ED PROVIDER NOTES
[facility]  ED      CHIEF COMPLAINT  Altered Mental Status (From home, hx of parkinson, possibly dehydrated. Altered per family. Continues to ask for water appears very dry, very poor skin turgor/  )       HISTORY OF PRESENT ILLNESS  Genesis Griffith is a 80 y.o. female  who presents to the ED from home due to altered mental status and concerns for dehydration. On arrival patient is mildly confused and appears very dry. She is continually asking for water and tell me that her neck hurts. She is unable to provide any significant history beyond this. She does have a tremor and has a history of Parkinson's disease. No other complaints, modifying factors or associated symptoms. I have reviewed the following from the nursing documentation. Past Medical History:   Diagnosis Date    Anxiety 7/20/2012    Arthritis     Asthma     Carpal tunnel syndrome of left wrist 10/6/2011    Depression 5/2/2012    GERD (gastroesophageal reflux disease) 9/4/2013    Hematuria     chronic from stone    History of nonadherence to medical treatment 10/11/2014    Hypertension     Kidney stone     saw Dr RU CEJA - 6 cm stone    Thyroid disease     goiter    Tight ring on finger     unable to remove - left hand    Wears dentures     full upper    Wears glasses     for reading     Past Surgical History:   Procedure Laterality Date    CARPAL TUNNEL RELEASE Right 09/14/2011    OPEN REDUCTION INTERNAL FIXATION RIGHT WRIST (RUSSO'S FRACTURE)    COLONOSCOPY N/A 7/1/2020    [facility]2:00) [facility] (CERVIX STATUS UNKNOWN)      SIGMOIDOSCOPY N/A 6/30/2020    FLEX SIG W/ANES.  performed by Markie Givens DO at [facility] Right 3/10/2020    RIGHT TOTAL KNEE REPLACEMENT       KANG & NEPHEW performed by Nallely Owens MD at [place] [id number] 6/30/2020    EGD BIOPSY performed by Juliana Yo DO Maikel at [facility] ENDOSCOPY  6/30/2020    EGD DILATION BALLOON performed by Harry Bowen DO at [facility] ENDOSCOPY     Family History   Problem Relation Age of Onset    Other Mother         scleroderma    Heart Disease Father         MI    Heart Attack Son 43    Diabetes Son      Social History     Socioeconomic History    Marital status:       Spouse name: Not on file    Number of children: Not on file    Years of education: Not on file    Highest education level: Not on file   Occupational History    Not on file   Tobacco Use    Smoking status: Never    Smokeless tobacco: Never   Vaping Use    Vaping Use: Never used   Substance and Sexual Activity    Alcohol use: No    Drug use: No    Sexual activity: Never   Other Topics Concern    Not on file   Social History Narrative    Not on file     Social Determinants of Health     Financial Resource Strain: Low Risk     Difficulty of Paying Living Expenses: Not hard at all   Food Insecurity: No Food Insecurity    Worried About Running Out of Food in the Last Year: Never true    Ran Out of Food in the Last Year: Never true   Transportation Needs: Not on file   Physical Activity: Inactive    Days of Exercise per Week: 0 days    Minutes of Exercise per Session: 0 min   Stress: Not on file   Social Connections: Not on file   Intimate Partner Violence: Not on file   Housing Stability: Not on file     Current Facility-Administered Medications   Medication Dose Route Frequency Provider Last Rate Last Admin    0.9 % sodium chloride bolus  1,000 mL IntraVENous Once Calderon Goldberg DO         Current Outpatient Medications   Medication Sig Dispense Refill    Calcium Carb-Cholecalciferol (OYSTER SHELL CALCIUM PLUS D) 500-5 MG-MCG TABS Take 500 mg by mouth daily (Patient not taking: Reported on 12/29/2022) 90 tablet 1    carbidopa-levodopa (SINEMET)  MG per tablet TAKE 1 TABLET BY MOUTH THREE TIMES A DAY (Patient taking differently: Pt states she is taking  1-2 every days) 270 tablet 1    pantoprazole (PROTONIX) 40 MG tablet Take 1 tablet by mouth every morning (before breakfast) 90 tablet 3    meloxicam (MOBIC) 15 MG tablet Take 1 tablet by mouth daily 90 tablet 3    albuterol sulfate HFA (PROVENTIL;VENTOLIN;PROAIR) 108 (90 Base) MCG/ACT inhaler Inhale 2 puffs into the lungs every 6 hours as needed for Wheezing or Shortness of Breath 1 each 1    Lift Chair MISC by Does not apply route 1 each 0    acetaminophen (TYLENOL) 500 MG tablet Take 500 mg by mouth daily      SENNA CO Take 1 tablet by mouth as needed        Allergies   Allergen Reactions    Hydrochlorothiazide      Constipation and severe leg cramps       REVIEW OF SYSTEMS  10 systems reviewed, pertinent positives per HPI otherwise noted to be negative. PHYSICAL EXAM  Ht 5' 7\" (1.702 m)   Wt 102 lb (46.3 kg)   BMI 15.98 kg/m²    General: Appears ill, cachectic  HEENT: Head atraumatic, Eyes normal inspection, PERRL. Normal ENT inspection, Pharynx normal.  Tongue is dry and cracked. NECK: Normal inspection  RESPIRATORY: Normal breath sounds. No chest wall tenderness. No respiratory distress  CVS: Heart rate and rhythm regular. No Murmurs  ABDOMEN/GI: Soft, Non-tender, No distention  BACK: Normal inspection  EXTREMITIES: Non-Tender. Full ROM. Normal appearance. No Pedal edema  NEURO: Alert and confused. Sensation normal. Motor normal  PSYCH: Mood normal. Affect anxious. SKIN: Color normal. No rash. Warm, Dry    LABS  I have reviewed all labs for this visit.    Results for orders placed or performed during the hospital encounter of 03/12/23   COVID-19 & Influenza Combo    Specimen: Nasopharyngeal Swab   Result Value Ref Range    SARS-CoV-2 RNA, RT PCR NOT DETECTED NOT DETECTED    INFLUENZA A NOT DETECTED NOT DETECTED    INFLUENZA B NOT DETECTED NOT DETECTED   CBC with Auto Differential   Result Value Ref Range    WBC 4.7 4.0 - 11.0 K/uL    RBC 4.25 4.00 - 5.20 M/uL Hemoglobin 12.8 12.0 - 16.0 g/dL    Hematocrit 39.3 36.0 - 48.0 %    MCV 92.4 80.0 - 100.0 fL    MCH 30.2 26.0 - 34.0 pg    MCHC 32.6 31.0 - 36.0 g/dL    RDW 14.3 12.4 - 15.4 %    Platelets 156 (L) 057 - 450 K/uL    MPV 9.4 5.0 - 10.5 fL    Neutrophils % 89.9 %    Lymphocytes % 5.6 %    Monocytes % 4.1 %    Eosinophils % 0.2 %    Basophils % 0.2 %    Neutrophils Absolute 4.2 1.7 - 7.7 K/uL    Lymphocytes Absolute 0.3 (L) 1.0 - 5.1 K/uL    Monocytes Absolute 0.2 0.0 - 1.3 K/uL    Eosinophils Absolute 0.0 0.0 - 0.6 K/uL    Basophils Absolute 0.0 0.0 - 0.2 K/uL   CMP w/ Reflex to MG   Result Value Ref Range    Sodium 131 (L) 136 - 145 mmol/L    Potassium reflex Magnesium 3.8 3.5 - 5.1 mmol/L    Chloride 95 (L) 99 - 110 mmol/L    CO2 27 21 - 32 mmol/L    Anion Gap 9 3 - 16    Glucose 82 70 - 99 mg/dL    BUN 24 (H) 7 - 20 mg/dL    Creatinine 0.9 0.6 - 1.2 mg/dL    Est, Glom Filt Rate >60 >60    Calcium 9.0 8.3 - 10.6 mg/dL    Total Protein 5.6 (L) 6.4 - 8.2 g/dL    Albumin 3.3 (L) 3.4 - 5.0 g/dL    Albumin/Globulin Ratio 1.4 1.1 - 2.2    Total Bilirubin 1.7 (H) 0.0 - 1.0 mg/dL    Alkaline Phosphatase 573 (H) 40 - 129 U/L     (H) 10 - 40 U/L     (H) 15 - 37 U/L   Lipase   Result Value Ref Range    Lipase 12.0 (L) 13.0 - 60.0 U/L   Lactate, Sepsis   Result Value Ref Range    Lactic Acid, Sepsis 1.4 0.4 - 1.9 mmol/L   Blood Gas, Venous   Result Value Ref Range    pH, Og 7.366 7.350 - 7.450    pCO2, Og 51.8 (H) 40.0 - 50.0 mmHg    pO2, Og 33.4 25.0 - 40.0 mmHg    HCO3, Venous 29.0 23.0 - 29.0 mmol/L    Base Excess, Og 2.7 -3.0 - 3.0 mmol/L    O2 Sat, Og 61 Not Established %    Carboxyhemoglobin 1.8 (H) 0.0 - 1.5 %    MetHgb, Og 0.5 <1.5 %    TC02 (Calc), Og 31 Not Established mmol/L    O2 Therapy Unknown    Troponin   Result Value Ref Range    Troponin 0.07 (H) <0.01 ng/mL   Protime-INR   Result Value Ref Range    Protime 16.4 (H) 11.7 - 14.5 sec    INR 1.32 (H) 0.87 - 1.14   Sample possible blood bank testing   Result Value Ref Range    Specimen Status RAYMOND    Urinalysis   Result Value Ref Range    Color, UA Yellow Straw/Yellow    Clarity, UA Clear Clear    Glucose, Ur Negative Negative mg/dL    Bilirubin Urine MODERATE (A) Negative    Ketones, Urine Negative Negative mg/dL    Specific Gravity, UA 1.025 1.005 - 1.030    Blood, Urine MODERATE (A) Negative    pH, UA 5.5 5.0 - 8.0    Protein, UA 30 (A) Negative mg/dL    Urobilinogen, Urine 2.0 (A) <2.0 E.U./dL    Nitrite, Urine Negative Negative    Leukocyte Esterase, Urine TRACE (A) Negative    Microscopic Examination YES     Urine Type NotGiven    Microscopic Urinalysis   Result Value Ref Range    Mucus, UA 3+ (A) None Seen /LPF    WBC, UA None seen 0 - 5 /HPF    RBC, UA >100 (A) 0 - 4 /HPF    Epithelial Cells, UA 21-50 (A) 0 - 5 /HPF       ECG  The Ekg interpreted by me shows  Significant artifact makes interpretation difficult. She does appear to have a narrow complex rhythm with PVCs with rate of 76 bpm.  There is incomplete right bundle branch block. Multiple leads have too much artifact to interpret at all, secondary to the patient's tremor. There is no obvious acute injury pattern within the limitations of the study. RADIOLOGY  CT ABDOMEN PELVIS WO CONTRAST Additional Contrast? None   Final Result   Evaluation of abdominal viscera is limited as described above. Visualized portions of the common duct are normal in caliber. Gaseous distention of large bowel, which may reflect ileus. Nonobstructing calculus seen within the right kidney. Large staghorn   calculus in the left kidney. Infrarenal abdominal aortic aneurysm measuring 3.7 cm. See recommendations   below. There is a consolidation seen within the lower lungs bilaterally, greater on   the right, the compatible with pneumonia and/or aspiration. Small bilateral pleural effusions, greater on the right. RECOMMENDATIONS:   3.7 cm infrarenal abdominal aortic aneurysm. Recommend follow-up every 2   years. Reference: J Am Segundo Radiol 5188;40:179-949. CT Head W/O Contrast   Final Result   Head CT: No acute intracranial abnormality detected. Cervical spine CT: No acute fracture or traumatic malalignment. CT C-Spine W/O Contrast   Final Result   Head CT: No acute intracranial abnormality detected. Cervical spine CT: No acute fracture or traumatic malalignment. XR CHEST PORTABLE   Final Result   Chronic obstructive lung changes with a questionable small infiltrate or   atelectasis along the right lung base medially which is more apparent. Stable mild cardiomegaly             ED COURSE/MDM  Patient seen and evaluated. Old records reviewed. Labs and imaging reviewed and results discussed with patient. DIFFERENTIAL DX  Dehydration, sepsis, ACS, renal failure    IV placed and lab work obtained and patient treated with IV fluids. Lab work is significant for mild hyponatremia and new transaminitis. Troponin is elevated but similar to previous. Fever treated with ibuprofen due to her new transaminitis. Acute hepatitis panel sent, hospitalist will follow. CT abdomen pelvis added on by hospitalist and does note nonobstructing kidney stones. Possible ileus. Chest x-ray here does show concern for pneumonia and urinalysis concerning for UTI. Treated with ceftriaxone and azithromycin. Discussed with hospitalist team, Dr. Pita Prieto, who agreed to meet the patient to his service. Critical care    Critical care time 34 minutes exclusive from separate billable procedures that were performed. The following was considered in the determination of critical care but not limited to the level of medical decision making, intensive cardiac and/or respiratory monitoring, frequent vital sign monitoring, evaluation of laboratory studies, evaluation of radiographic studies, oxygen monitoring, and constant monitoring and speaking to family at bedside.   Is this patient to be included in the SEP-1 Core Measure due to severe sepsis or septic shock? No   Exclusion criteria - the patient is NOT to be included for SEP-1 Core Measure due to:  May have criteria for sepsis, but does not meet criteria for severe sepsis or septic shock    During the patient's ED course, the patient was given:  Medications   0.9 % sodium chloride bolus (0 mLs IntraVENous Stopped 3/12/23 2149)   cefTRIAXone (ROCEPHIN) 1,000 mg in sodium chloride 0.9 % 50 mL IVPB (mini-bag) (0 mg IntraVENous Stopped 3/12/23 2149)     And   azithromycin (ZITHROMAX) 500 mg in 250 mL addavial (0 mg IntraVENous Stopped 3/12/23 2252)   ibuprofen (ADVIL;MOTRIN) tablet 600 mg (600 mg Oral Given 3/12/23 2310)        ICarmencita DO,  am the primary clinician of record. CLINICAL IMPRESSION  1. Pneumonia due to infectious organism, unspecified laterality, unspecified part of lung    2. Elevated troponin    3. Urinary tract infection with hematuria, site unspecified    4. Septicemia ([address]    5. Transaminitis        Height 5' 7\" (1.702 m), weight 102 lb (46.3 kg), not currently breastfeeding. Rise Mu was admitted in stable condition. Patient was given scripts for the following medications. I counseled patient how to take these medications. New Prescriptions    No medications on file       Follow-up with:  No follow-up provider specified. DISCLAIMER: This chart was created using Dragon dictation software. Efforts were made by me to ensure accuracy, however some errors may be present due to limitations of this technology and occasionally words are not transcribed correctly.         Carmencita Martini DO  03/12/23 4970

## 2023-03-12 NOTE — ED NOTES
Patient identified as a positive fall risk on the ED triage fall screening. Patient placed in fall precautions which includes:  yellow fall risk bracelet on wrist and yellow socks on feet. Patient instructed on importance of not getting out of bed or ambulating without assistance for safety. Pt verbalized understanding.        Justin Dixon RN  03/12/23 0216

## 2023-03-13 LAB
ACETAMINOPHEN LEVEL: <5 UG/ML (ref 10–30)
ALBUMIN SERPL-MCNC: 2.6 G/DL (ref 3.4–5)
ALP BLD-CCNC: 401 U/L (ref 40–129)
ALT SERPL-CCNC: 13 U/L (ref 10–40)
ANION GAP SERPL CALCULATED.3IONS-SCNC: 9 MMOL/L (ref 3–16)
AST SERPL-CCNC: 181 U/L (ref 15–37)
BASOPHILS ABSOLUTE: 0 K/UL (ref 0–0.2)
BASOPHILS RELATIVE PERCENT: 0.2 %
BILIRUB SERPL-MCNC: 1.1 MG/DL (ref 0–1)
BILIRUBIN DIRECT: 0.7 MG/DL (ref 0–0.3)
BILIRUBIN, INDIRECT: 0.4 MG/DL (ref 0–1)
BUN BLDV-MCNC: 24 MG/DL (ref 7–20)
CALCIUM SERPL-MCNC: 8.4 MG/DL (ref 8.3–10.6)
CHLORIDE BLD-SCNC: 103 MMOL/L (ref 99–110)
CO2: 24 MMOL/L (ref 21–32)
CREAT SERPL-MCNC: 0.8 MG/DL (ref 0.6–1.2)
EOSINOPHILS ABSOLUTE: 0 K/UL (ref 0–0.6)
EOSINOPHILS RELATIVE PERCENT: 0.8 %
GFR SERPL CREATININE-BSD FRML MDRD: >60 ML/MIN/{1.73_M2}
GLUCOSE BLD-MCNC: 75 MG/DL (ref 70–99)
HAV IGM SER IA-ACNC: NORMAL
HCT VFR BLD CALC: 33.5 % (ref 36–48)
HEMOGLOBIN: 10.9 G/DL (ref 12–16)
HEPATITIS B CORE IGM ANTIBODY: NORMAL
HEPATITIS B SURFACE ANTIGEN INTERPRETATION: NORMAL
HEPATITIS C ANTIBODY INTERPRETATION: NORMAL
LYMPHOCYTES ABSOLUTE: 0.2 K/UL (ref 1–5.1)
LYMPHOCYTES RELATIVE PERCENT: 8.4 %
MCH RBC QN AUTO: 30.3 PG (ref 26–34)
MCHC RBC AUTO-ENTMCNC: 32.6 G/DL (ref 31–36)
MCV RBC AUTO: 92.9 FL (ref 80–100)
MONOCYTES ABSOLUTE: 0.2 K/UL (ref 0–1.3)
MONOCYTES RELATIVE PERCENT: 6.4 %
NEUTROPHILS ABSOLUTE: 2.5 K/UL (ref 1.7–7.7)
NEUTROPHILS RELATIVE PERCENT: 84.2 %
PDW BLD-RTO: 14.2 % (ref 12.4–15.4)
PLATELET # BLD: 83 K/UL (ref 135–450)
PLATELET SLIDE REVIEW: ABNORMAL
PMV BLD AUTO: 9.6 FL (ref 5–10.5)
POTASSIUM REFLEX MAGNESIUM: 3.6 MMOL/L (ref 3.5–5.1)
RBC # BLD: 3.6 M/UL (ref 4–5.2)
REPORT: NORMAL
SARS-COV-2, NAAT: NOT DETECTED
SLIDE REVIEW: ABNORMAL
SODIUM BLD-SCNC: 136 MMOL/L (ref 136–145)
TOTAL PROTEIN: 4.7 G/DL (ref 6.4–8.2)
TSH REFLEX FT4: 1 UIU/ML (ref 0.27–4.2)
TSH SERPL DL<=0.05 MIU/L-ACNC: 1.23 UIU/ML (ref 0.27–4.2)
WBC # BLD: 2.9 K/UL (ref 4–11)

## 2023-03-13 PROCEDURE — 2580000003 HC RX 258: Performed by: INTERNAL MEDICINE

## 2023-03-13 PROCEDURE — 6360000002 HC RX W HCPCS: Performed by: INTERNAL MEDICINE

## 2023-03-13 PROCEDURE — 80048 BASIC METABOLIC PNL TOTAL CA: CPT

## 2023-03-13 PROCEDURE — 6370000000 HC RX 637 (ALT 250 FOR IP): Performed by: INTERNAL MEDICINE

## 2023-03-13 PROCEDURE — 1200000000 HC SEMI PRIVATE

## 2023-03-13 PROCEDURE — 84443 ASSAY THYROID STIM HORMONE: CPT

## 2023-03-13 PROCEDURE — 97530 THERAPEUTIC ACTIVITIES: CPT

## 2023-03-13 PROCEDURE — 51798 US URINE CAPACITY MEASURE: CPT

## 2023-03-13 PROCEDURE — 80076 HEPATIC FUNCTION PANEL: CPT

## 2023-03-13 PROCEDURE — 87635 SARS-COV-2 COVID-19 AMP PRB: CPT

## 2023-03-13 PROCEDURE — 6360000002 HC RX W HCPCS: Performed by: HOSPITALIST

## 2023-03-13 PROCEDURE — 85025 COMPLETE CBC W/AUTO DIFF WBC: CPT

## 2023-03-13 PROCEDURE — 2580000003 HC RX 258: Performed by: HOSPITALIST

## 2023-03-13 PROCEDURE — 97162 PT EVAL MOD COMPLEX 30 MIN: CPT

## 2023-03-13 PROCEDURE — 97166 OT EVAL MOD COMPLEX 45 MIN: CPT

## 2023-03-13 PROCEDURE — 36415 COLL VENOUS BLD VENIPUNCTURE: CPT

## 2023-03-13 RX ORDER — SIMETHICONE 80 MG
80 TABLET,CHEWABLE ORAL 4 TIMES DAILY
Status: DISPENSED | OUTPATIENT
Start: 2023-03-13 | End: 2023-03-15

## 2023-03-13 RX ADMIN — SODIUM CHLORIDE, PRESERVATIVE FREE 10 ML: 5 INJECTION INTRAVENOUS at 21:08

## 2023-03-13 RX ADMIN — SIMETHICONE 80 MG: 80 TABLET, CHEWABLE ORAL at 17:44

## 2023-03-13 RX ADMIN — SIMETHICONE 80 MG: 80 TABLET, CHEWABLE ORAL at 21:07

## 2023-03-13 RX ADMIN — SODIUM CHLORIDE: 9 INJECTION, SOLUTION INTRAVENOUS at 00:12

## 2023-03-13 RX ADMIN — CARBIDOPA AND LEVODOPA 1 TABLET: 25; 100 TABLET ORAL at 00:31

## 2023-03-13 RX ADMIN — PANTOPRAZOLE SODIUM 40 MG: 40 TABLET, DELAYED RELEASE ORAL at 06:23

## 2023-03-13 RX ADMIN — SODIUM CHLORIDE, PRESERVATIVE FREE 10 ML: 5 INJECTION INTRAVENOUS at 09:42

## 2023-03-13 RX ADMIN — SIMETHICONE 80 MG: 80 TABLET, CHEWABLE ORAL at 09:40

## 2023-03-13 RX ADMIN — ENOXAPARIN SODIUM 30 MG: 100 INJECTION SUBCUTANEOUS at 09:40

## 2023-03-13 RX ADMIN — CARBIDOPA AND LEVODOPA 1 TABLET: 25; 100 TABLET ORAL at 09:40

## 2023-03-13 RX ADMIN — CEFTRIAXONE SODIUM 1000 MG: 1 INJECTION, POWDER, FOR SOLUTION INTRAMUSCULAR; INTRAVENOUS at 17:44

## 2023-03-13 RX ADMIN — SIMETHICONE 80 MG: 80 TABLET, CHEWABLE ORAL at 13:36

## 2023-03-13 RX ADMIN — CARBIDOPA AND LEVODOPA 1 TABLET: 25; 100 TABLET ORAL at 21:07

## 2023-03-13 ASSESSMENT — ENCOUNTER SYMPTOMS
RESPIRATORY NEGATIVE: 1
GASTROINTESTINAL NEGATIVE: 1

## 2023-03-13 NOTE — PROGRESS NOTES
Hospitalist Progress Note      PCP: Rose Mary Staples MD    Date of Admission: 3/12/2023    Chief Complaint:     Hospital Course:     Subjective:   Patient states that she feels better. No fever still. Has a mild cough. No chest pain or shortness of breath    Medications:  Reviewed    Infusion Medications    sodium chloride 50 mL/hr at 03/13/23 8638    sodium chloride       Scheduled Medications    simethicone  80 mg Oral 4x Daily    carbidopa-levodopa  1 tablet Oral BID    pantoprazole  40 mg Oral QAM AC    sodium chloride flush  10 mL IntraVENous 2 times per day    enoxaparin  30 mg SubCUTAneous Daily     PRN Meds: sodium chloride flush, sodium chloride, ondansetron **OR** ondansetron, senna      Intake/Output Summary (Last 24 hours) at 3/13/2023 0816  Last data filed at 3/13/2023 6294  Gross per 24 hour   Intake 365.89 ml   Output 135 ml   Net 230.89 ml       Physical Exam Performed:    /72   Pulse 50   Temp 97.8 °F (36.6 °C) (Oral)   Resp 18   Ht 5' 7\" (1.702 m)   Wt 106 lb 4.2 oz (48.2 kg)   SpO2 94%   BMI 16.64 kg/m²     General appearance: No apparent distress, appears stated age and cooperative. HEENT: Pupils equal, round, and reactive to light. Conjunctivae/corneas clear. Neck: Supple, with full range of motion. No jugular venous distention. Trachea midline. Respiratory:  Normal respiratory effort. Clear to auscultation, bilaterally without Rales/Wheezes/Rhonchi. Cardiovascular: Regular rate and rhythm with normal S1/S2 without murmurs, rubs or gallops. Abdomen: Soft, non-tender, non-distended with normal bowel sounds. Musculoskeletal: No clubbing, cyanosis or edema bilaterally. Full range of motion without deformity. Skin: Skin color, texture, turgor normal.  No rashes or lesions. Neurologic:  Neurovascularly intact without any focal sensory/motor deficits.  Cranial nerves: II-XII intact, grossly non-focal.  Psychiatric: Alert and oriented, thought content appropriate, normal insight  Capillary Refill: Brisk, 3 seconds, normal   Peripheral Pulses: +2 palpable, equal bilaterally       Labs:   Recent Labs     03/12/23  1901 03/13/23  0649   WBC 4.7 2.9*   HGB 12.8 10.9*   HCT 39.3 33.5*   * 83*     Recent Labs     03/12/23  1901 03/13/23  0649   * 136   K 3.8 3.6   CL 95* 103   CO2 27 24   BUN 24* 24*   CREATININE 0.9 0.8   CALCIUM 9.0 8.4     Recent Labs     03/12/23  1901 03/13/23  0649   * 181*   * 13   BILIDIR  --  0.7*   BILITOT 1.7* 1.1*   ALKPHOS 573* 401*     Recent Labs     03/12/23  1901   INR 1.32*     Recent Labs     03/12/23 1901   TROPONINI 0.07*       Urinalysis:      Lab Results   Component Value Date/Time    NITRU Negative 03/12/2023 08:50 PM    WBCUA None seen 03/12/2023 08:50 PM    BACTERIA 4+ 06/27/2020 04:00 PM    RBCUA >100 03/12/2023 08:50 PM    BLOODU MODERATE 03/12/2023 08:50 PM    SPECGRAV 1.025 03/12/2023 08:50 PM    GLUCOSEU Negative 03/12/2023 08:50 PM       Radiology:  CT ABDOMEN PELVIS WO CONTRAST Additional Contrast? None   Final Result   Evaluation of abdominal viscera is limited as described above.      Visualized portions of the common duct are normal in caliber.      Gaseous distention of large bowel, which may reflect ileus.      Nonobstructing calculus seen within the right kidney.  Large staghorn   calculus in the left kidney.      Infrarenal abdominal aortic aneurysm measuring 3.7 cm.  See recommendations   below.      There is a consolidation seen within the lower lungs bilaterally, greater on   the right, the compatible with pneumonia and/or aspiration.      Small bilateral pleural effusions, greater on the right.      RECOMMENDATIONS:   3.7 cm infrarenal abdominal aortic aneurysm. Recommend follow-up every 2   years.      Reference: J Am Segundo Radiol 2013;10:789-794.            CT Head W/O Contrast   Final Result   Head CT: No acute intracranial abnormality detected.      Cervical spine CT: No acute fracture or traumatic  malalignment. CT C-Spine W/O Contrast   Final Result   Head CT: No acute intracranial abnormality detected. Cervical spine CT: No acute fracture or traumatic malalignment. XR CHEST PORTABLE   Final Result   Chronic obstructive lung changes with a questionable small infiltrate or   atelectasis along the right lung base medially which is more apparent. Stable mild cardiomegaly             IP CONSULT TO HOSPITALIST  IP CONSULT TO GI    Assessment/Plan:      -Acute metabolic encephalopathy dt infection. Evy Benjamin Appears to be improving. .    -Transaminitis--improving--acute hepatitis panel negative-liver and biliary ducts normal on CT although not completely visualized --- Tylenol level not elevated---  GI consult    -presumed Viral illness . fever/cough/report of diarrhea prior to admission--clinically improving. Evy Benjamin COVID and influenza negative    -Pancytopenia,acute--suspect due to viral illness--continue to monitor    -Physical deconditioning--continue PT and OT    -Dehydration-improved with IV fluids    -Parkinson's disease--continue Sinemet    -GERD--continue PPI    Addendum 2. 03PM  Patient has E. coli bacteremia suspected dt GI source-poa. .. Received 1 dose of IV Rocephin and azithromycin 3/12. ......  continue IV Rocephin and follow-up on cultures        DVT Prophylaxis:   Diet: ADULT DIET; Easy to Chew  ADULT ORAL NUTRITION SUPPLEMENT; Lunch, Dinner, PM Snack; Standard High Calorie/High Protein Oral Supplement  Code Status: Full Code  PT/OT Eval Status: Active and ongoing    Dispo -plan to discharge to SNF once cleared by RITA Malin MD

## 2023-03-13 NOTE — H&P
HOSPITAL MEDICINE  HISTORY & PHYSICAL        Date of Admission: 3/12/2023  MRN: [id number]  Date of Service: 03/13/23       CHIEF COMPLAINT:    Chief Complaint   Patient presents with    Altered Mental Status     From home, hx of parkinson, possibly dehydrated. Altered per family. Continues to ask for water appears very dry, very poor skin turgor              HISTORY OF PRESENT ILLNESS:     Eitan Marcos is a 80 y.o. female with a PMHx of HTN, who presented to the [facility] ED for evaluation of     Pt brought in by son today for increased lethargy and altered mental status. Pt notes increased fatigue today with episode of diarrhea yesterday along with urinary incontinence, which is unusual for her. Her PO intake has been poor. She denies abdominal pain. Took 2 tabs of Tylenol for pain to the neck, feels she pulled a muscle getting into bed several days ago. She does have a hx of chronic upper extremity resting tremor and difficulty with extension of the elbows and fingers. In the ER, mildly hypertensive and febrile to 100.7 °F.  Labs show sodium 131, elevated transaminases ALT//395 respectively, alk phos 573, and bili 1.7. INR 1.32. Acetaminophen level is negative. Troponin mildly elevated 0.07. No leukocytosis, lactic WNL. UA w/ mod blood, without evidence of infection. Case discussed with ED BRIAN for admission. External medical records reviewed.     PAST MEDICAL HX:  Past Medical History:   Diagnosis Date    Anxiety 7/20/2012    Arthritis     Asthma     Carpal tunnel syndrome of left wrist 10/6/2011    Depression 5/2/2012    GERD (gastroesophageal reflux disease) 9/4/2013    Hematuria     chronic from stone    History of nonadherence to medical treatment 10/11/2014    Hypertension     Kidney stone     saw Dr Sami Lal - 6 cm stone    Thyroid disease     goiter    Tight ring on finger     unable to remove - left hand    Wears dentures     full upper    Wears glasses     for reading SURGICAL HX:  Past Surgical History:   Procedure Laterality Date    CARPAL TUNNEL RELEASE Right 09/14/2011    OPEN REDUCTION INTERNAL FIXATION RIGHT WRIST (RUSSO'S FRACTURE)    COLONOSCOPY N/A 7/1/2020    COLON (2:00) performed by Antonina Rockwell DO at . [facility] 16 (CERVIX STATUS UNKNOWN)      SIGMOIDOSCOPY N/A 6/30/2020    FLEX SIG W/ANES.  performed by Antonina Rockwell DO at [address] [facility] Right 3/10/2020    RIGHT TOTAL KNEE REPLACEMENT       KANG & NEPHEW performed by Flores Hsu MD at [address] [place] N/A 6/30/2020    EGD BIOPSY performed by Antonina Rockwell DO at [facility]  6/30/2020    EGD DILATION BALLOON performed by Antonina Rockwell DO at [address] [facility] HX:  Family History   Problem Relation Age of Onset    Other Mother         scleroderma    Heart Disease Father         MI    Heart Attack Son 43    Diabetes Son        SOCIAL HX:  Social History     Tobacco Use    Smoking status: Never    Smokeless tobacco: Never   Vaping Use    Vaping Use: Never used   Substance Use Topics    Alcohol use: No    Drug use: No        ALLERGIES:  Allergies   Allergen Reactions    Hydrochlorothiazide      Constipation and severe leg cramps         MEDICATIONS:     Current Facility-Administered Medications:     carbidopa-levodopa (SINEMET)  MG per tablet 1 tablet, 1 tablet, Oral, BID, Mark Cui MD, 1 tablet at 03/13/23 0031    pantoprazole (PROTONIX) tablet 40 mg, 40 mg, Oral, QAM AC, Mark Cui MD    0.9 % sodium chloride infusion, , IntraVENous, Continuous, Mark Cui MD, Last Rate: 50 mL/hr at 03/13/23 0012, New Bag at 03/13/23 0012    sodium chloride flush 0.9 % injection 10 mL, 10 mL, IntraVENous, 2 times per day, Mark Cui MD    sodium chloride flush 0.9 % injection 10 mL, 10 mL, IntraVENous, PRN, Mark Cui MD    0.9 % sodium chloride infusion, , IntraVENous, PRN, Terry Hoyt MD    enoxaparin Sodium (LOVENOX) injection 30 mg, 30 mg, SubCUTAneous, Daily, Terry Hoyt MD    ondansetron (ZOFRAN-ODT) disintegrating tablet 4 mg, 4 mg, Oral, Q8H PRN **OR** ondansetron (ZOFRAN) injection 4 mg, 4 mg, IntraVENous, Q6H PRN, eTrry Hoyt MD    senna (SENOKOT) tablet 8.6 mg, 1 tablet, Oral, Daily PRN, Terry Hoyt MD     REVIEW OF SYSTEMS:   Review of Systems   Constitutional: Negative. HENT: Negative. Respiratory: Negative. Cardiovascular: Negative. Gastrointestinal: Negative. Musculoskeletal: Negative. Skin: Negative. Neurological: Negative. Psychiatric/Behavioral: Negative. All other systems reviewed and are negative. PHYSICAL EXAM:   /71   Pulse 50   Temp 97.7 °F (36.5 °C) (Oral)   Resp 17   Ht 5' 7\" (1.702 m)   Wt 106 lb 4.2 oz (48.2 kg)   SpO2 92% Comment: finger probe  BMI 16.64 kg/m²     Physical Exam  Vitals reviewed. Constitutional:       General: She is not in acute distress. Appearance: She is well-developed. She is ill-appearing. She is not diaphoretic. Comments: Weak, frail appearing   HENT:      Head: Normocephalic and atraumatic. Mouth/Throat:      Mouth: Mucous membranes are dry. Eyes:      Extraocular Movements: Extraocular movements intact. Pupils: Pupils are equal, round, and reactive to light. Cardiovascular:      Rate and Rhythm: Normal rate and regular rhythm. Pulses: Normal pulses. Heart sounds: Normal heart sounds. No murmur heard. No friction rub. No gallop. Pulmonary:      Effort: Pulmonary effort is normal. No respiratory distress. Breath sounds: Normal breath sounds. No wheezing or rales. Chest:      Chest wall: No tenderness. Abdominal:      General: There is no distension. Palpations: Abdomen is soft. There is no mass. Tenderness: There is no abdominal tenderness. There is no guarding. Comments:  Bowel sounds decreased   Musculoskeletal:         General: No tenderness or deformity. Cervical back: Normal range of motion and neck supple. Right lower leg: No edema. Left lower leg: No edema. Comments: Neck ROM limited due to pain on left side along SCM. Skin:     General: Skin is warm and dry. Capillary Refill: Capillary refill takes 2 to 3 seconds. Comments: Poor skin turgor. Swelling along right elbow without evidence of skin breakdown   Neurological:      General: No focal deficit present. Mental Status: She is alert and oriented to person, place, and time. Psychiatric:         Behavior: Behavior normal.       LABS / IMAGING:     Recent Labs     03/12/23 1901   WBC 4.7   HGB 12.8   HCT 39.3   *     Recent Labs     03/12/23 1901   *   K 3.8   CL 95*   CO2 27   BUN 24*   CREATININE 0.9   CALCIUM 9.0     Recent Labs     03/12/23 1901   *   *   BILITOT 1.7*   ALKPHOS 573*     Recent Labs     03/12/23 1901   INR 1.32*     Recent Labs     03/12/23 1901   TROPONINI 0.07*       CT ABDOMEN PELVIS WO CONTRAST Additional Contrast? None   Final Result   Evaluation of abdominal viscera is limited as described above. Visualized portions of the common duct are normal in caliber. Gaseous distention of large bowel, which may reflect ileus. Nonobstructing calculus seen within the right kidney. Large staghorn   calculus in the left kidney. Infrarenal abdominal aortic aneurysm measuring 3.7 cm. See recommendations   below. There is a consolidation seen within the lower lungs bilaterally, greater on   the right, the compatible with pneumonia and/or aspiration. Small bilateral pleural effusions, greater on the right. RECOMMENDATIONS:   3.7 cm infrarenal abdominal aortic aneurysm. Recommend follow-up every 2   years. Reference: J Am Segundo Radiol 9884;09:781-583.             CT Head W/O Contrast   Final Result   Head CT: No acute intracranial abnormality detected. Cervical spine CT: No acute fracture or traumatic malalignment. CT C-Spine W/O Contrast   Final Result   Head CT: No acute intracranial abnormality detected. Cervical spine CT: No acute fracture or traumatic malalignment. XR CHEST PORTABLE   Final Result   Chronic obstructive lung changes with a questionable small infiltrate or   atelectasis along the right lung base medially which is more apparent. Stable mild cardiomegaly                 ASSESSMENT / PLAN:     Acute metabolic encephalopathy  Liver transaminitis  Dehydration  Diarrhea  Urinary incontinence  Generalized weakness   Parkinson's disease  Chronic staghorn calculus     Unclear etiology for elevated LFTs, were normal 6/2022. Denies abd pain. No evidence of infection.      -check acute hepatitis panel, TSH  -daily LFTs  -IVF  -Simethicone 4 times daily  -Resume home Sinemet, Protonix  -GI evaluation  -PT/OT eval        Dispo: Admit to Inpatient med/surg. Expected LOS greater than 2 midnights.   PPx:     lovenox  PT/OT:    Ordered  Code status:  Full         Enrique Tabares MD  Hospitalist

## 2023-03-13 NOTE — PROGRESS NOTES
4 Eyes Admission Assessment     I agree as the admission nurse that 2 RN's have performed a thorough Head to Toe Skin Assessment on the patient. ALL assessment sites listed below have been assessed on admission. Areas assessed by both nurses:   [x]   Head, Face, and Ears   [x]   Shoulders, Back, and Chest  [x]   Arms, Elbows, and Hands   [x]   Coccyx, Sacrum, and Ischum  [x]   Legs, Feet, and Heels        Does the Patient have Skin Breakdown?   No       Blanchable redness on buttocks  Oscar Prevention initiated:  Yes   Wound Care Orders initiated:  NA      [facility] nurse consulted for Pressure Injury (Stage 3,4, Unstageable, DTI, NWPT, and Complex wounds):  NA      Nurse 1 eSignature: Electronically signed by Leah Pritchard RN on 3/13/23 at 1:22 AM EDT    **SHARE this note so that the co-signing nurse is able to place an eSignature**    Nurse 2 eSignature: Electronically signed by Jasen Fields RN on 3/13/23 at 2:58 AM EDT

## 2023-03-13 NOTE — CONSULTS
Lele Lezama is a 80 y.o. female asked to see us in consultation by  Jessenia Aguila MD & Tatum Foster MD for evaluation of elevated LFTs. Ms. Sarah Maciel is an 80-year-old female established with Dr. Adrian Singh with past medical history of anxiety, asthma, depression, GERD, HTN, dysphagia, pyloric stenosis, diverticulosis, kidney stones, thyroid disease and Parkinson's disease who presented to the ER with altered mental status, lethargy and diarrhea. She was found to have a fever of 100.7. Her infectious work-up has been negative thus far. Her LFTs were also found to be elevated for which we have been consulted to evaluate. , , alk phos 573 and bilirubin 1.7 on admission yesterday. All these have decreased as of this morning. She denies new medications. She has not been on antibiotics recently. No reports of abdominal pain. She had an episode of vomiting yesterday. Denies nausea and further diarrhea toady. CTAp reports normal caliber of the CBD. A possible ileus is noted.             Medications Prior to Admission: Calcium Carb-Cholecalciferol (OYSTER SHELL CALCIUM PLUS D) 500-5 MG-MCG TABS, Take 500 mg by mouth daily (Patient not taking: Reported on 12/29/2022)  carbidopa-levodopa (SINEMET)  MG per tablet, TAKE 1 TABLET BY MOUTH THREE TIMES A DAY (Patient taking differently: Pt states she is taking  1-2 every days)  pantoprazole (PROTONIX) 40 MG tablet, Take 1 tablet by mouth every morning (before breakfast)  meloxicam (MOBIC) 15 MG tablet, Take 1 tablet by mouth daily  albuterol sulfate HFA (PROVENTIL;VENTOLIN;PROAIR) 108 (90 Base) MCG/ACT inhaler, Inhale 2 puffs into the lungs every 6 hours as needed for Wheezing or Shortness of Breath  Lift Chair MISC, by Does not apply route  acetaminophen (TYLENOL) 500 MG tablet, Take 500 mg by mouth daily  SENNA CO, Take 1 tablet by mouth as [FreeTextEntry1] : #HCM - Continue with healthy diet and exercise. Pap smear UTD, negative 2019. Labs ordered as below. Hep b testing. Flu shot today. Tdap UTD. Mammography and us breast ordered. Referral provided as below. \par \par She will return if Hep B ab are negative.  needed     Medication:   simethicone  80 mg Oral 4x Daily    carbidopa-levodopa  1 tablet Oral BID    pantoprazole  40 mg Oral QAM AC    sodium chloride flush  10 mL IntraVENous 2 times per day    enoxaparin  30 mg SubCUTAneous Daily      sodium chloride 50 mL/hr at 03/13/23 5683    sodium chloride         Allergies: Allergies   Allergen Reactions    Hydrochlorothiazide      Constipation and severe leg cramps       Immunizations:  Immunization History   Administered Date(s) Administered    Influenza, Triv, inactivated, subunit, adjuvanted, IM (Fluad 65 yrs and older) 11/14/2019    PPD Test 12/17/2008    Pneumococcal Conjugate 13-valent ([employer]) 10/18/2017    Pneumococcal Polysaccharide ([id number]) 12/31/1996, 12/17/2008    Td, unspecified formulation 12/17/2008       Family history, past medical history, and  social  history  are  reviewed as  below. Past Medical  History:  Past Medical History:   Diagnosis Date    Anxiety 7/20/2012    Arthritis     Asthma     Carpal tunnel syndrome of left wrist 10/6/2011    Depression 5/2/2012    GERD (gastroesophageal reflux disease) 9/4/2013    Hematuria     chronic from stone    History of nonadherence to medical treatment 10/11/2014    Hypertension     Kidney stone     saw Dr Tamela Yip - 6 cm stone    Thyroid disease     goiter    Tight ring on finger     unable to remove - left hand    Wears dentures     full upper    Wears glasses     for reading       Past  Surgical History:  Past Surgical History:   Procedure Laterality Date    CARPAL TUNNEL RELEASE Right 09/14/2011    OPEN REDUCTION INTERNAL FIXATION RIGHT WRIST (RUSSO'S FRACTURE)    COLONOSCOPY N/A 7/1/2020    COLON (2:00) performed by Trinity Islas DO at . [facility] (CERVIX STATUS UNKNOWN)      SIGMOIDOSCOPY N/A 6/30/2020    FLEX SIG W/ANES.  performed by Trinity Islas DO at [facility] Right 3/10/2020    RIGHT TOTAL [address] & NEPHEW performed by Nirav Boogie MD at [address] N/A 6/30/2020    EGD BIOPSY performed by Stephen Mckinley DO at [facility]  6/30/2020    EGD DILATION BALLOON performed by Stephen Mckinley DO at [address] [facility]       Family  History:  Family History   Problem Relation Age of Onset    Other Mother         scleroderma    Heart Disease Father         MI    Heart Attack Son 43    Diabetes Son        Social History:  Social History     Tobacco Use    Smoking status: Never    Smokeless tobacco: Never   Vaping Use    Vaping Use: Never used   Substance Use Topics    Alcohol use: No    Drug use: No       ROS  Constitutional:  Denies fever,sweats, chills or weight loss  Eyes:  Denies change in visual acuity   HENT:  Denies hearing loss or dizziness  Respiratory:  Denies cough or shortness of breath   Cardiovascular:  Denies edema or chest pain  :  Denies dysuria, hematuria, urgency or frequency  Musculoskeletal:  + back pain or joint pain   Integument:  Denies rash   Neurologic:  Denies headache, previous stroke, TIA, confusion   Endocrine:  Denies polyuria or polydipsia   Lymphatic:  Denies swollen glands   Psychiatric:  Denies depression or anxiety   Hematologic: Denies previous anemia or easy bruising    All other review of systems negative, except for those noted. PHYSICAL EXAM:   VITAL SIGNS: /67   Pulse (!) 49   Temp 98.3 °F (36.8 °C) (Oral)   Resp 16   Ht 5' 7\" (1.702 m)   Wt 106 lb 4.2 oz (48.2 kg)   SpO2 93%   BMI 16.64 kg/m²   Date 03/13/23 0000 - 03/13/23 235   Shift 7684-7000 3288-5413 2920-3368 24 Hour Total   INTAKE   P.O.  120  120   I. V.(mL/kg/hr) 316.8(0.8)   316.8   IV Piggyback 49.1   49.1   Shift Total(mL/kg) 365.9(7.6) 120(2.5)  485. 9(10.1)   OUTPUT   Urine(mL/kg/hr) 135(0.4)   135   Shift Total(mL/kg) 135(2.8)   135(2.8)   Weight (kg) 48.2 48.2 48.2 48.2       Constitutional: Well developed. Well nourished. Non-toxic appearance. No acute distress. HENT: Normocephalic. Atraumatic. Bilateral external ears normal, Oropharynx moist.  No oral exudate. Nose normal.   Eyes: No  Scleral icterus   Cardiovascular: RRR  Thorax & Lungs: Non labored at rest, no respiratory distress  Abdomen: soft, NT. No guarding, rebound tenderness. No hepatomegaly. No splenomegaly. No ascites  Rectal:  Deferred. Skin: Warm, dry. No erythema. No rash. Extremities: No edema. Neurologic: Alert & oriented x 3, + tremor, aphasia     RESULTS    Lab Results   Component Value Date    ALT 13 03/13/2023     (H) 03/13/2023    ALKPHOS 401 (H) 03/13/2023    BILIDIR 0.7 (H) 03/13/2023    PROT 4.7 (L) 03/13/2023    LABALBU 2.6 (L) 03/13/2023    INR 1.32 (H) 03/12/2023    LIPASE 12.0 (L) 03/12/2023     Lab Results   Component Value Date    WBC 2.9 (L) 03/13/2023    HGB 10.9 (L) 03/13/2023    HCT 33.5 (L) 03/13/2023    MCV 92.9 03/13/2023    PLT 83 (L) 03/13/2023     Lab Results   Component Value Date    CREATININE 0.8 03/13/2023    BUN 24 (H) 03/13/2023     03/13/2023    K 3.6 03/13/2023     03/13/2023    CO2 24 03/13/2023       IMAGES  CT ABDOMEN PELVIS WO CONTRAST Additional Contrast? None    Result Date: 3/12/2023  Evaluation of abdominal viscera is limited as described above. Visualized portions of the common duct are normal in caliber. Gaseous distention of large bowel, which may reflect ileus. Nonobstructing calculus seen within the right kidney. 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 Infrarenal abdominal aortic aneurysm measuring 3.7 cm. See recommendations below. There is a consolidation seen within the lower lungs bilaterally, greater on the right, the compatible with pneumonia and/or aspiration. Small bilateral pleural effusions, greater on the right. RECOMMENDATIONS: 3.7 cm infrarenal abdominal aortic aneurysm. Recommend follow-up every 2 years. Reference: J Am Segundo Radiol 0933;45:730-992. CT Head W/O Contrast    Result Date: 3/12/2023  Head CT: No acute intracranial abnormality detected. Cervical spine CT: No acute fracture or traumatic malalignment. CT C-Spine W/O Contrast    Result Date: 3/12/2023  Head CT: No acute intracranial abnormality detected. Cervical spine CT: No acute fracture or traumatic malalignment. XR CHEST PORTABLE    Result Date: 3/12/2023  Chronic obstructive lung changes with a questionable small infiltrate or atelectasis along the right lung base medially which is more apparent. Stable mild cardiomegaly           Assessment:  1. Elevated LFTs. Improved from yesterday. Likely secondary to viral infection given diarrhea, vomiting, fever. DILI also possible. No evidence of biliary obstruction on CT. Acute hep panel negative. Plan:  If LFTs worsen again would recommend a RUQ US and further serological work-up. Will monitor for now and recheck her labs in the AM.      Thank you for permitting us to participate in the care of your patient. Please do not hesitate to call with questions or concerns. 1.  The patient indicates understanding of these issues and agrees with the plan. 2.  I reviewed the patient's medical information and medical history. 3.  I have reviewed the past medical, family, and social history sections including the medications and allergies listed in the above medical record.         Electronically signed by: RACHEL Taylor 3/13/2023 9:12 AM

## 2023-03-13 NOTE — PROGRESS NOTES
Physical Therapy  Facility/Department: [place] [address] - MED SURG  Physical Therapy Initial Assessment    Name: Marin Davison  : 1935  MRN: [id number]  Date of Service: 3/13/2023    Discharge Recommendations:  Subacute/Skilled Nursing Facility   PT Equipment Recommendations  Equipment Needed: No      Patient Diagnosis(es): The primary encounter diagnosis was Pneumonia due to infectious organism, unspecified laterality, unspecified part of lung. Diagnoses of Elevated troponin, Urinary tract infection with hematuria, site unspecified, Septicemia ([address] and Transaminitis were also pertinent to this visit. Past Medical History:  has a past medical history of Anxiety, Arthritis, Asthma, Carpal tunnel syndrome of left wrist, Depression, GERD (gastroesophageal reflux disease), Hematuria, History of nonadherence to medical treatment, Hypertension, Kidney stone, Thyroid disease, Tight ring on finger, Wears dentures, and Wears glasses. Past Surgical History:  has a past surgical history that includes Hysterectomy; Carpal tunnel release (Right, 2011); Total knee arthroplasty (Right, 3/10/2020); Upper gastrointestinal endoscopy (N/A, 2020); sigmoidoscopy (N/A, 2020); Upper gastrointestinal endoscopy (2020); and Colonoscopy (N/A, 2020). Assessment   Body Structures, Functions, Activity Limitations Requiring Skilled Therapeutic Intervention: Decreased functional mobility ; Decreased balance;Decreased endurance  Assessment: Pt functioning below baseline needing A x 2 for bed mobility and transfers. Pt lives with grandson in a first floor apartment. she reports staying in a hospital bed and does not get up without his assistance, however he is gone most of the day at school. Pt states she recently started HHPT and ambulates up to 61' with her rollator. Denies fall. Today, pt required Ax2 for bed mobility and with taking a couple steps to bedside chair.  Pt expressed desire to return home with current set up. Due to Ax2 and decline in mobility, PT to recommend SNF to decrease burden of care and maximize pt's independence. Treatment Diagnosis: decreased mobility  Therapy Prognosis: Good  Decision Making: Medium Complexity  Barriers to Learning: none  Requires PT Follow-Up: Yes  Activity Tolerance  Activity Tolerance: Patient tolerated evaluation without incident;Patient tolerated treatment well     Plan   Physcial Therapy Plan  General Plan: 3-5 times per week  Current Treatment Recommendations: Strengthening, ROM, Balance training, Functional mobility training, Transfer training, Gait training, Positioning  Safety Devices  Type of Devices: All denise prominences offloaded, Patient at risk for falls, All fall risk precautions in place, Call light within reach, Chair alarm in place, Gait belt, Nurse notified, Left in chair  Restraints  Restraints Initially in Place: No     Restrictions  Restrictions/Precautions  Restrictions/Precautions: Up as Tolerated, Fall Risk     Subjective   General  Chart Reviewed: Yes  Patient assessed for rehabilitation services?: Yes  Response To Previous Treatment: Not applicable  Family / Caregiver Present: No  Referring Practitioner: Mariela Anne MD  Referral Date : 03/13/23  Diagnosis: AMS  Follows Commands: Within Functional Limits  General Comment  Comments: cleared by nursing  Subjective  Subjective: pt resting in bed. Denies pain at rest         Social/Functional History  Social/Functional History  Lives With:  (grandson.  \"gone for most of the day\")  Type of Home: Apartment  Home Layout: One level  Home Access: Level entry  Bathroom Shower/Tub:  (sponge bath)  Bathroom Toilet: Handicap height  Bathroom Equipment: Commode, Grab bars around toilet  Home Equipment: Hospital bed, Rollator, Reacher, Sock aid  Has the patient had two or more falls in the past year or any fall with injury in the past year?: No  Receives Help From: Family, Home health (home health 1x/week)  ADL Assistance: Needs assistance (azeb helps \"when he has time\")  Homemaking Responsibilities: No  Ambulation Assistance: Needs assistance (Pt reports ambulaing 61' to the kitchen 1x//day with rollator with WC follow by azeb)  Transfer Assistance: Needs assistance  Active : No  Occupation: Retired  Additional Comments: does not get up without azeb, but he is not present most of the time. Recently started HHPT. receives MOW  Vision/Hearing  Vision  Vision: Within Functional Limits  Hearing  Hearing: Within functional limits    Cognition   Orientation  Overall Orientation Status: Within Normal Limits     Objective   Heart Rate: 61  Heart Rate Source: Monitor  BP: 126/68  BP Location: Right upper arm  BP Method: Automatic  Patient Position: Semi fowlers  MAP (Calculated): 87  Resp: 16  SpO2: 93 %  O2 Device: None (Room air)     Observation/Palpation  Observation: tremor of R hand  Gross Assessment  AROM: Grossly decreased, non-functional  PROM: Grossly decreased, non-functional  Tone: Abnormal  Sensation: Intact                 Balance  Sitting: Impaired  Sitting - Static: Unsupported; Fair (occasional)  Standing: Impaired  Standing - Static: Constant support  Standing - Dynamic: Constant support  Transfer Training  Transfer Training: Yes  Overall Level of Assistance: Assist X2  Interventions: Manual cues  Sit to Stand: Assist X2  Stand to Sit: Assist X2  Stand Pivot Transfers: Assist X2  Bed to Chair: Assist X2  Bed mobility  Supine to Sit: 2 Person assistance;Maximum assistance  Sit to Supine: Unable to assess  Scooting: Dependent/Total  Transfers  Sit to Stand: 2 Person Assistance  Stand to Sit: 2 Person Assistance  Stand Pivot Transfers: 2 Person Assistance; Moderate Assistance  Ambulation  Device: [address]: 2 Person assistance; Moderate assistance  Quality of Gait: very stiff gait with limited ROM. With additional time, inccreased ankle ROM for R heel to touch the floor.  L ankle remained in plantarflexion  Distance: 2'  Comments: posterior lean     Balance  Posture: Poor  Sitting - Static: Fair  Sitting - Dynamic: Fair  Standing - Static: Poor  Standing - Dynamic: Poor         AM-PAC Score  AM-PAC Inpatient Mobility Raw Score : 8 (03/13/23 1017)  AM-PAC Inpatient T-Scale Score : 28.52 (03/13/23 1017)  Mobility Inpatient CMS 0-100% Score: 86.62 (03/13/23 1017)  Mobility Inpatient CMS G-Code Modifier : CM (03/13/23 1017)        Goals  Short Term Goals  Time Frame for Short Term Goals: 3/20  Short Term Goal 1: Pt will complete bed mobility with mod A x 1  Short Term Goal 2: Pt will sit to stand with min A x 1  Short Term Goal 3: Pt will ambulate x 15' with min A x 2 with RW  Short Term Goal 4: Pt will complete B LE exercises to improve ROM and mobility by 3/15  Patient Goals   Patient Goals : to go home       Education  Patient Education  Education Given To: Patient  Education Provided: Role of Therapy;Plan of Care  Education Method: Verbal  Barriers to Learning: Lack of Family Support  Education Outcome: Verbalized understanding      Therapy Time   Individual Concurrent Group Co-treatment   Time In 0910         Time Out 0945         Minutes 35         Timed Code Treatment Minutes: 25 Minutes       Hong Bradley PT   If pt is unable to be seen after this session, please let this note serve as discharge summary. Please see case management note for discharge disposition. Thank you.

## 2023-03-13 NOTE — PROGRESS NOTES
Patient admitted to room 355 from ED. Patient oriented to room, call light, bed rails, phone, lights and bathroom. Patient instructed about the schedule of the day including: vital sign frequency, lab draws, possible tests, frequency of MD and staff rounds, including RN/MD rounding together at bedside, daily weights, and I &O's. Patient instructed about prescribed diet, how to use 8MENU, and television. bed alarm in place, patient aware of placement and reason. Telemetry box 116 in place, patient aware of placement and reason. Bed locked, in lowest position, side rails up 2/4, call light within reach. Will continue to monitor.

## 2023-03-13 NOTE — CONSULTS
Consult placed    [address] [facility] [address] [place] [address]  Date:3/13/2023,  Time:8:09 AM        Electronically signed by Tariq Aj on 3/13/2023 at 8:09 AM

## 2023-03-13 NOTE — PLAN OF CARE
Problem: Discharge Planning  Goal: Discharge to home or other facility with appropriate resources  3/13/2023 1400 by Vanessa Bermeo RN  Outcome: Progressing  3/13/2023 0508 by Carolina Novoa RN  Outcome: Progressing     Problem: Skin/Tissue Integrity  Goal: Absence of new skin breakdown  Description: 1. Monitor for areas of redness and/or skin breakdown  2. Assess vascular access sites hourly  3. Every 4-6 hours minimum:  Change oxygen saturation probe site  4. Every 4-6 hours:  If on nasal continuous positive airway pressure, respiratory therapy assess nares and determine need for appliance change or resting period. 3/13/2023 1400 by Vanessa Bermeo RN  Outcome: Progressing  3/13/2023 0508 by Carolina Novoa RN  Outcome: Progressing     Problem: Confusion  Goal: Confusion, delirium, dementia, or psychosis is improved or at baseline  Description: INTERVENTIONS:  1. Assess for possible contributors to thought disturbance, including medications, impaired vision or hearing, underlying metabolic abnormalities, dehydration, psychiatric diagnoses, and notify attending LIP  2. [place] high risk fall precautions, as indicated  3. Provide frequent short contacts to provide reality reorientation, refocusing and direction  4. Decrease environmental stimuli, including noise as appropriate  5. Monitor and intervene to maintain adequate nutrition, hydration, elimination, sleep and activity  6. If unable to ensure safety without constant attention obtain sitter and review sitter guidelines with assigned personnel  7.  Initiate Psychosocial CNS and Spiritual Care consult, as indicated  3/13/2023 1400 by Vanessa Bemreo RN  Outcome: Progressing  3/13/2023 0508 by Carolina Novoa RN  Outcome: Progressing     Problem: Safety - Adult  Goal: Free from fall injury  3/13/2023 1400 by Vanessa Bermeo RN  Outcome: Progressing  3/13/2023 0508 by Carolina Novoa RN  Outcome: Progressing     Problem: Neurosensory - Adult  Goal: Achieves stable or improved neurological status  3/13/2023 1400 by Quillian Lanes, RN  Outcome: Progressing  3/13/2023 0508 by Devika Hilario RN  Outcome: Progressing     Problem: Cardiovascular - Adult  Goal: Maintains optimal cardiac output and hemodynamic stability  3/13/2023 1400 by Quillian Lanes, RN  Outcome: Progressing  3/13/2023 0508 by Devika Hilario RN  Outcome: Progressing  Goal: Absence of cardiac dysrhythmias or at baseline  3/13/2023 1400 by Quillian Lanes, RN  Outcome: Progressing  3/13/2023 0508 by Devika Hilario RN  Outcome: Progressing     Problem: Skin/Tissue Integrity - Adult  Goal: Skin integrity remains intact  3/13/2023 1400 by Quillian Lanes, RN  Outcome: Progressing  3/13/2023 0508 by Devika Hilario RN  Outcome: Progressing     Problem: Musculoskeletal - Adult  Goal: Return mobility to safest level of function  3/13/2023 1400 by Quillian Lanes, RN  Outcome: Progressing  3/13/2023 0508 by Devika Hilario RN  Outcome: Progressing  Goal: Return ADL status to a safe level of function  3/13/2023 1400 by Quillian Lanes, RN  Outcome: Progressing  3/13/2023 0508 by Devika Hilario RN  Outcome: Progressing     Problem: Genitourinary - Adult  Goal: Absence of urinary retention  3/13/2023 1400 by Quillian Lanes, RN  Outcome: Progressing  3/13/2023 0508 by Devika Hilario RN  Outcome: Progressing  Goal: Urinary catheter remains patent  3/13/2023 1400 by Quillian Lanes, RN  Outcome: Progressing  3/13/2023 0508 by Devika Hilario RN  Outcome: Progressing     Problem: Infection - Adult  Goal: Absence of infection at discharge  3/13/2023 1400 by Quillian Lanes, RN  Outcome: Progressing  3/13/2023 0508 by Devika Hilario RN  Outcome: Progressing  Goal: Absence of infection during hospitalization  3/13/2023 1400 by Quillian Lanes, RN  Outcome: Progressing  3/13/2023 0508 by Devika Hilario RN  Outcome: Progressing     Problem: Metabolic/Fluid and Electrolytes - Adult  Goal: Electrolytes maintained within normal limits  3/13/2023 1400 by Quillian Lanes, RN  Outcome: Progressing  3/13/2023 0508 by Carolina Novoa RN  Outcome: Progressing     Problem: Hematologic - Adult  Goal: Maintains hematologic stability  3/13/2023 1400 by Vanessa Bermeo RN  Outcome: Progressing  3/13/2023 0508 by Carolina Novoa RN  Outcome: Progressing     Problem: ABCDS Injury Assessment  Goal: Absence of physical injury  3/13/2023 1400 by Vanessa Bermeo RN  Outcome: Progressing  3/13/2023 0508 by Carolina Novoa RN  Outcome: Progressing

## 2023-03-13 NOTE — PROGRESS NOTES
Occupational Therapy  Facility/Department: [place] [address] - MED SURG  Occupational Therapy Initial Assessment    Name: Matti Rowland  : 1935  MRN: [id number]  Date of Service: 3/13/2023    Discharge Recommendations:  Subacute/Skilled Nursing Facility  Barriers to home discharge:     [x] Reported available assist at home upon discharge limited             Patient Diagnosis(es): The primary encounter diagnosis was Pneumonia due to infectious organism, unspecified laterality, unspecified part of lung. Diagnoses of Elevated troponin, Urinary tract infection with hematuria, site unspecified, Septicemia ([address] and Transaminitis were also pertinent to this visit. Past Medical History:  has a past medical history of Anxiety, Arthritis, Asthma, Carpal tunnel syndrome of left wrist, Depression, GERD (gastroesophageal reflux disease), Hematuria, History of nonadherence to medical treatment, Hypertension, Kidney stone, Thyroid disease, Tight ring on finger, Wears dentures, and Wears glasses. Past Surgical History:  has a past surgical history that includes Hysterectomy; Carpal tunnel release (Right, 2011); Total knee arthroplasty (Right, 3/10/2020); Upper gastrointestinal endoscopy (N/A, 2020); sigmoidoscopy (N/A, 2020); Upper gastrointestinal endoscopy (2020); and Colonoscopy (N/A, 2020). Assessment   Performance deficits / Impairments: Decreased functional mobility ; Decreased balance;Decreased safe awareness;Decreased ADL status; Decreased posture;Decreased ROM; Decreased strength  Assessment: pt reports normally supervision only with BADL's & bathroom mobility with RW, denies falls; pt admitted with confusion/septicemia, now requiring max assist of 2 bed mobility, sit<-->stand & stand-pivot transfers; pt cooperative & to benefit from skilled OT services;  REQUIRES OT FOLLOW-UP: Yes  Activity Tolerance  Activity Tolerance: Patient Tolerated treatment well  Activity Tolerance Comments: vitals stable on RA        Plan   Occupational Therapy Plan  Times Per Week: 3-5x/ week  Current Treatment Recommendations: ROM, Balance training, Functional mobility training, Endurance training, Safety education & training, Positioning, Self-Care / ADL     Restrictions  Restrictions/Precautions  Restrictions/Precautions: Up as Tolerated, Fall Risk  Position Activity Restriction  Other position/activity restrictions: stewart catheter, IV,    Subjective   General  Chart Reviewed: Yes  Patient assessed for rehabilitation services?: Yes  Family / Caregiver Present: No  Referring Practitioner: Dr. Todd Jeffers  Diagnosis: Aleene Bue, encephalopathy  Subjective  Subjective: reports Left knee pain at res \"chronic\"; able to continue with therapy  General Comment  Comments: RN cleared pt for OT eval; pt resting in bed, agreeable to therapy     Social/Functional History  Social/Functional History  Lives With:  (grandson. \"gone for most of the day\")  Type of Home: Apartment  Home Layout: One level  Home Access: Level entry  Bathroom Shower/Tub:  (sponge bath)  Bathroom Toilet: Handicap height  Bathroom Equipment: Commode, Grab bars around toilet  Home Equipment: Hospital bed, Rollator, Reacher, Sock aid  Has the patient had two or more falls in the past year or any fall with injury in the past year?: No  Receives Help From: Family, Home health (home health 1x/week)  ADL Assistance: Needs assistance (azeb helps \"when he has time\")  Homemaking Responsibilities: No  Ambulation Assistance: Needs assistance (Pt reports ambulaing 61' to the kitchen 1x//day with rollator with WC follow by azeb)  Transfer Assistance: Needs assistance  Active : No  Occupation: Retired  Additional Comments: does not get up without azeb, but he is not present most of the time.  Recently started HHPT. receives MOW       Objective   Heart Rate: 61  Heart Rate Source: Monitor  BP: 126/68  BP Location: Right upper arm  BP Method: Automatic  Patient Position: Semi fowlers  MAP (Calculated): 87  Resp: 16  SpO2: 93 %  O2 Device: None (Room air)          Observation/Palpation  Observation: tremor of R hand  Safety Devices  Type of Devices: All denise prominences offloaded; Patient at risk for falls; All fall risk precautions in place;Call light within reach; Chair alarm in place;Gait belt;Nurse notified; Left in chair  Restraints  Restraints Initially in Place: No    AROM: Grossly decreased, non-functional (proximally & long standing flexor tightness hand finger flexion)  Strength: Grossly decreased, non-functional  Coordination: Generally decreased, functional  Tone: Abnormal (resting tremors Left > Right)    ADL  LE Dressing: Dependent/Total  Toileting: Dependent/Total (stewart catheter)     Activity Tolerance  Activity Tolerance: Patient tolerated evaluation without incident;Patient tolerated treatment well     Transfers  Stand Pivot Transfers: 2 Person assistance (mod/max assist of 2 to Left with RW for support)  Sit to stand: 2 Person assistance (mod assist of 2 with RW, posterior lean, but unable to bear wt. on heels)  Stand to sit: 2 Person assistance (max assist of 2 into recliner chair)    Vision  Vision: Within Functional Limits    Hearing  Hearing: Within functional limits    Cognition  Overall Cognitive Status: Exceptions  Arousal/Alertness: Appropriate responses to stimuli  Following Commands:  Follows one step commands with increased time  Attention Span: Appears intact  Memory: Decreased recall of precautions  Safety Judgement: Decreased awareness of need for safety;Decreased awareness of need for assistance  Insights: Decreased awareness of deficits  Initiation: Requires cues for some  Sequencing: Requires cues for some  Orientation  Overall Orientation Status: Within Normal Limits  Orientation Level: Oriented X4                  Education Given To: Patient  Education Provided: Role of Therapy;Plan of Care;Precautions  Education Provided Comments: disease speccific: importance of use of RED/nurse call light for assist with ADL & transfers, OOB with staff assist;  Education Method: Demonstration;Verbal  Barriers to Learning: Cognition  Education Outcome: Verbalized understanding;Continued education needed      AM-PAC Score        AM-PAC Inpatient Daily Activity Raw Score: 7 (03/13/23 1103)  AM-PAC Inpatient ADL T-Scale Score : 20.13 (03/13/23 1103)  ADL Inpatient CMS 0-100% Score: 92.44 (03/13/23 1103)  ADL Inpatient CMS G-Code Modifier : CM (03/13/23 1103)    [place] Score       Goals  Short Term Goals  Time Frame for Short Term Goals: 1 week(3-20-23)  Short Term Goal 1: mod assist of 1 with functional/toilet transfers by 3-20-23  Short Term Goal 2: mod assist of 1 sit<-->stand by 3-20-23  Short Term Goal 3: min assist with UE light grooming by 3-18-23  Short Term Goal 4: tolerate 10 reps BUE AROM exercises  Patient Goals   Patient goals : \"go home\"       Therapy Time   Individual Concurrent Group Co-treatment   Time In [address]         Time Out 0940         Minutes [address]

## 2023-03-13 NOTE — CARE COORDINATION
Case Management Assessment  Initial Evaluation    Date/Time of Evaluation: 3/13/2023 1:14 PM  Assessment Completed by: Jorge Luis Adan RN    If patient is discharged prior to next notation, then this note serves as note for discharge by case management. Patient Name: Frankey Martins                   YOB: 1935  Diagnosis: Septicemia ([address] [A41.9]  Transaminitis [R74.01]  Elevated troponin [R77.8]  Urinary tract infection with hematuria, site unspecified [N39.0, N65.0]  Acute metabolic encephalopathy [M92.59]  Pneumonia due to infectious organism, unspecified laterality, unspecified part of lung [J18.9]                   Date / Time: 3/12/2023  6:49 PM    Patient Admission Status: Inpatient   Readmission Risk (Low < 19, Mod (19-27), High > 27): Readmission Risk Score: 12.8    Current PCP: Alfredito Crawford MD  PCP verified by CM? Yes    Chart Reviewed: Yes      History Provided by: Patient  Patient Orientation: Alert and Oriented, Person, Place, Situation, Self    Patient Cognition: Alert    Hospitalization in the last 30 days (Readmission):  No    If yes, Readmission Assessment in CM Navigator will be completed.     Advance Directives:      Code Status: Full Code   Patient's Primary Decision Maker is: Legal Next of Kin    Primary Decision Maker: Nell Amayazen - [phone]    Discharge Planning:    Patient lives with: Family Members Type of Home: Apartment  Primary Care Giver: Family  Patient Support Systems include: Family Members   Current Financial resources: Medicaid, Medicare  Current community resources: Housing  Current services prior to admission: Durable Medical Equipment, Other (Comment), Home Care (aide services 4 hours/week, [facility])            Current DME: Walker            Type of Home Care services:  None    ADLS  Prior functional level: Assistance with the following:, Bathing, Dressing, Cooking, Housework, Shopping, Mobility  Current functional level: Assistance with the following:, Bathing, Dressing, Toileting, Cooking, Housework, Shopping, Mobility    PT AM-PAC: 8 /24  OT AM-PAC: 7 /24    Family can provide assistance at DC: Yes  Would you like Case Management to discuss the discharge plan with any other family members/significant others, and if so, who? Yes (Sanford Burdick)  Plans to Return to Present Housing: No  Other Identified Issues/Barriers to RETURNING to current housing: fall risk  Potential Assistance needed at discharge: Home Care            Potential DME:    Patient expects to discharge to: Apartment (per pt)  Plan for transportation at discharge:      Financial    Payor: Luis / Plan: Vickie Primas / Product Type: *No Product type* /     Does insurance require precert for SNF: Yes    Potential assistance Purchasing Medications:    Meds-to-Beds request: Yes      [id number] [place], [place] - [address] [facility] [phone] - F [phone]  [address]  [address] [facility]  Phone: [phone] Fax: [phone]    [address] - [place] [phone] - f [phone]  [place] [facility]  [address] [place] [address]  Phone: [phone] Fax: [phone]      Notes:    Factors facilitating achievement of predicted outcomes: Family support, Motivated, Cooperative, Pleasant, Sense of humor, Good insight into deficits, Has needed Durable Medical Equipment at home, and Knowledge about rehab    Barriers to discharge: Medical complications    Additional Case Management Notes: AMS (resolved), transaminitis. Management per GI and IM. Pt from home alone. Fonnie Prayer helps. PTOT rec SNF. Referral to [facility], per pt preference. [facility] accepted. Starting precert today. Rapid COVID ordered. Pt and grandson update.  Thelma Ellis RN     The Plan for Transition of Care is related to the following treatment goals of Septicemia [facility]) [A41.9]  Transaminitis [R74.01]  Elevated troponin [R77.8]  Urinary tract infection with hematuria, site unspecified [N39.0, I64.2]  Acute metabolic encephalopathy [O38.40]  Pneumonia due to infectious organism, unspecified laterality, unspecified part of lung [X95.0]    IF APPLICABLE: The Patient and/or patient representative David Juarez and her family were provided with a choice of provider and agrees with the discharge plan. Freedom of choice list with basic dialogue that supports the patient's individualized plan of care/goals and shares the quality data associated with the providers was provided to:     Patient Representative Name:       The Patient and/or Patient Representative Agree with the Discharge Plan?       Eliu Hidalgo RN  Case Management Department  Ph: [phone] Fax: [phone]

## 2023-03-13 NOTE — PLAN OF CARE
Problem: Skin/Tissue Integrity  Goal: Absence of new skin breakdown  Description: 1. Monitor for areas of redness and/or skin breakdown  2. Assess vascular access sites hourly  3. Every 4-6 hours minimum:  Change oxygen saturation probe site  4. Every 4-6 hours:  If on nasal continuous positive airway pressure, respiratory therapy assess nares and determine need for appliance change or resting period. Outcome: Progressing     Problem: Confusion  Goal: Confusion, delirium, dementia, or psychosis is improved or at baseline  Description: INTERVENTIONS:  1. Assess for possible contributors to thought disturbance, including medications, impaired vision or hearing, underlying metabolic abnormalities, dehydration, psychiatric diagnoses, and notify attending LIP  2. [place] high risk fall precautions, as indicated  3. Provide frequent short contacts to provide reality reorientation, refocusing and direction  4. Decrease environmental stimuli, including noise as appropriate  5. Monitor and intervene to maintain adequate nutrition, hydration, elimination, sleep and activity  6. If unable to ensure safety without constant attention obtain sitter and review sitter guidelines with assigned personnel  7.  Initiate Psychosocial CNS and Spiritual Care consult, as indicated  Outcome: Progressing   Patient has exhibited no sign of confusion since admission  Problem: Safety - Adult  Goal: Free from fall injury  Outcome: Progressing     Problem: Neurosensory - Adult  Goal: Achieves stable or improved neurological status  Outcome: Progressing     Problem: Infection - Adult  Goal: Absence of infection during hospitalization  Outcome: Progressing     Problem: Infection - Adult  Goal: Absence of infection at discharge  Outcome: Progressing   Fever has subsided since admission  Problem: Genitourinary - Adult  Goal: Urinary catheter remains patent  Outcome: Progressing     Problem: Genitourinary - Adult  Goal: Absence of urinary retention  Outcome: Progressing

## 2023-03-14 ENCOUNTER — APPOINTMENT (OUTPATIENT)
Dept: GENERAL RADIOLOGY | Age: 88
DRG: 871 | End: 2023-03-14
Payer: COMMERCIAL

## 2023-03-14 LAB
ALBUMIN SERPL-MCNC: 2.3 G/DL (ref 3.4–5)
ALP SERPL-CCNC: 320 U/L (ref 40–129)
ALT SERPL-CCNC: 11 U/L (ref 10–40)
ANION GAP SERPL CALCULATED.3IONS-SCNC: 7 MMOL/L (ref 3–16)
AST SERPL-CCNC: 56 U/L (ref 15–37)
BASOPHILS # BLD: 0 K/UL (ref 0–0.2)
BASOPHILS NFR BLD: 0.1 %
BILIRUB DIRECT SERPL-MCNC: <0.2 MG/DL (ref 0–0.3)
BILIRUB INDIRECT SERPL-MCNC: ABNORMAL MG/DL (ref 0–1)
BILIRUB SERPL-MCNC: 0.5 MG/DL (ref 0–1)
BUN SERPL-MCNC: 23 MG/DL (ref 7–20)
CALCIUM SERPL-MCNC: 8.2 MG/DL (ref 8.3–10.6)
CHLORIDE SERPL-SCNC: 104 MMOL/L (ref 99–110)
CO2 SERPL-SCNC: 24 MMOL/L (ref 21–32)
CREAT SERPL-MCNC: 0.7 MG/DL (ref 0.6–1.2)
DEPRECATED RDW RBC AUTO: 14.2 % (ref 12.4–15.4)
EOSINOPHIL # BLD: 0 K/UL (ref 0–0.6)
EOSINOPHIL NFR BLD: 0.8 %
GFR SERPLBLD CREATININE-BSD FMLA CKD-EPI: >60 ML/MIN/{1.73_M2}
GLUCOSE SERPL-MCNC: 79 MG/DL (ref 70–99)
HCT VFR BLD AUTO: 31.7 % (ref 36–48)
HGB BLD-MCNC: 10.4 G/DL (ref 12–16)
LYMPHOCYTES # BLD: 0.2 K/UL (ref 1–5.1)
LYMPHOCYTES NFR BLD: 7.8 %
MAGNESIUM SERPL-MCNC: 1.7 MG/DL (ref 1.8–2.4)
MCH RBC QN AUTO: 30.4 PG (ref 26–34)
MCHC RBC AUTO-ENTMCNC: 32.9 G/DL (ref 31–36)
MCV RBC AUTO: 92.4 FL (ref 80–100)
MONOCYTES # BLD: 0.2 K/UL (ref 0–1.3)
MONOCYTES NFR BLD: 7.8 %
NEUTROPHILS # BLD: 2.6 K/UL (ref 1.7–7.7)
NEUTROPHILS NFR BLD: 83.5 %
PLATELET # BLD AUTO: 86 K/UL (ref 135–450)
PMV BLD AUTO: 9.6 FL (ref 5–10.5)
POTASSIUM SERPL-SCNC: 3.5 MMOL/L (ref 3.5–5.1)
PROT SERPL-MCNC: 4.5 G/DL (ref 6.4–8.2)
RBC # BLD AUTO: 3.43 M/UL (ref 4–5.2)
SODIUM SERPL-SCNC: 135 MMOL/L (ref 136–145)
WBC # BLD AUTO: 3.1 K/UL (ref 4–11)

## 2023-03-14 PROCEDURE — 2580000003 HC RX 258: Performed by: INTERNAL MEDICINE

## 2023-03-14 PROCEDURE — 97535 SELF CARE MNGMENT TRAINING: CPT

## 2023-03-14 PROCEDURE — 2580000003 HC RX 258: Performed by: HOSPITALIST

## 2023-03-14 PROCEDURE — 80076 HEPATIC FUNCTION PANEL: CPT

## 2023-03-14 PROCEDURE — 97110 THERAPEUTIC EXERCISES: CPT

## 2023-03-14 PROCEDURE — 2580000003 HC RX 258: Performed by: NURSE PRACTITIONER

## 2023-03-14 PROCEDURE — 80048 BASIC METABOLIC PNL TOTAL CA: CPT

## 2023-03-14 PROCEDURE — 1200000000 HC SEMI PRIVATE

## 2023-03-14 PROCEDURE — 71045 X-RAY EXAM CHEST 1 VIEW: CPT

## 2023-03-14 PROCEDURE — 83735 ASSAY OF MAGNESIUM: CPT

## 2023-03-14 PROCEDURE — 51798 US URINE CAPACITY MEASURE: CPT

## 2023-03-14 PROCEDURE — 6360000002 HC RX W HCPCS: Performed by: HOSPITALIST

## 2023-03-14 PROCEDURE — 97530 THERAPEUTIC ACTIVITIES: CPT

## 2023-03-14 PROCEDURE — 36415 COLL VENOUS BLD VENIPUNCTURE: CPT

## 2023-03-14 PROCEDURE — 93005 ELECTROCARDIOGRAM TRACING: CPT | Performed by: HOSPITALIST

## 2023-03-14 PROCEDURE — 85025 COMPLETE CBC W/AUTO DIFF WBC: CPT

## 2023-03-14 PROCEDURE — 6370000000 HC RX 637 (ALT 250 FOR IP): Performed by: INTERNAL MEDICINE

## 2023-03-14 RX ORDER — SODIUM CHLORIDE 9 MG/ML
INJECTION, SOLUTION INTRAVENOUS ONCE
Status: COMPLETED | OUTPATIENT
Start: 2023-03-14 | End: 2023-03-14

## 2023-03-14 RX ADMIN — SODIUM CHLORIDE, PRESERVATIVE FREE 10 ML: 5 INJECTION INTRAVENOUS at 20:46

## 2023-03-14 RX ADMIN — PANTOPRAZOLE SODIUM 40 MG: 40 TABLET, DELAYED RELEASE ORAL at 05:47

## 2023-03-14 RX ADMIN — CEFTRIAXONE 2000 MG: 2 INJECTION, POWDER, FOR SOLUTION INTRAMUSCULAR; INTRAVENOUS at 10:25

## 2023-03-14 RX ADMIN — SIMETHICONE 80 MG: 80 TABLET, CHEWABLE ORAL at 13:05

## 2023-03-14 RX ADMIN — SODIUM CHLORIDE: 9 INJECTION, SOLUTION INTRAVENOUS at 01:32

## 2023-03-14 RX ADMIN — CARBIDOPA AND LEVODOPA 1 TABLET: 25; 100 TABLET ORAL at 08:16

## 2023-03-14 RX ADMIN — SODIUM CHLORIDE, PRESERVATIVE FREE 10 ML: 5 INJECTION INTRAVENOUS at 08:18

## 2023-03-14 RX ADMIN — SIMETHICONE 80 MG: 80 TABLET, CHEWABLE ORAL at 08:16

## 2023-03-14 NOTE — PROGRESS NOTES
Physical Therapy  Facility/Department: [facility] B3 - MED SURG  Daily Treatment Note  NAME: Keila Overton  : 1935  MRN: [id number]    Date of Service: 3/14/2023    Discharge Recommendations:  Subacute/Skilled Nursing Facility   PT Equipment Recommendations  Equipment Needed: No    Patient Diagnosis(es): The primary encounter diagnosis was Pneumonia due to infectious organism, unspecified laterality, unspecified part of lung. Diagnoses of Elevated troponin, Urinary tract infection with hematuria, site unspecified, Septicemia ([address] and Transaminitis were also pertinent to this visit. Assessment   Assessment: Pt seen for PT treatment follow up this date and seen as a co-treat with OT in order to maximize pt functional mobility and outcomes. Pt requested to get up to the chair when arrived and motivated to work with therapy. Pt required MaxAx2 for supine>sit and ModAx2 to stand from EOB with Nicolasa Stakes. Used Stedy for total A bed>chair transfer and completed x1 stand from chair with ModAx1 with Stedy. Pt completed x10 reps BLE exercises with VC for proper technique, pt demoed limited BLE AROM with all exercises. Pt would continue to benefit from skilled therapy in the acute setting in order to address functional deficits. Recommend SNF upon d/c.   Activity Tolerance: Patient tolerated treatment well;Patient limited by endurance  Equipment Needed: No     Plan    Physcial Therapy Plan  General Plan: 3-5 times per week  Current Treatment Recommendations: Strengthening;ROM;Balance training;Functional mobility training;Transfer training;Gait training;Positioning     Restrictions  Restrictions/Precautions  Restrictions/Precautions: Up as Tolerated, Fall Risk  Required Braces or Orthoses?: No  Position Activity Restriction  Other position/activity restrictions: purewick, O2     Subjective    Subjective  Subjective: Arrived and pt requested to get up to the chair  Pain: No c/o pain  Orientation  Overall Orientation Status: Within Normal Limits  Orientation Level: Oriented X4  Cognition  Overall Cognitive Status: Exceptions  Arousal/Alertness: Appropriate responses to stimuli  Following Commands: Follows one step commands with increased time  Attention Span: Appears intact  Memory: Decreased recall of precautions  Safety Judgement: Decreased awareness of need for safety;Decreased awareness of need for assistance  Insights: Decreased awareness of deficits  Initiation: Requires cues for some  Sequencing: Requires cues for some     Objective   Vitals   BP: 151/81, HR: 71, SpO2: 95%    Bed Mobility Training  Bed Mobility Training: Yes  Overall Level of Assistance: Maximum assistance;Assist X2;Additional time (HOB slightly elevated, use of bedrails)  Supine to Sit: Maximum assistance;Assist X2;Additional time  Sit to Supine: Other (comment) (pt up in chair at EOS)  Scooting: Maximum assistance;Assist X2  Balance  Sitting: Impaired (posterior lean unsupported, able to support self with bedrails. Pt states she has carpal tunnel and isn't able to hold self up long)  Sitting - Static: Supported sitting; Fair (occasional)  Sitting - Dynamic: Supported sitting;Poor (constant support)  Standing: Impaired  Standing - Static: Constant support;Poor  Standing - Dynamic: Poor;Constant support  Transfer Training  Transfer Training: Yes  Overall Level of Assistance: Moderate assistance;Assist X2;Adaptive equipment; Additional time Glenn Melton)  Interventions: Manual cues; Verbal cues; Safety awareness training  Sit to Stand: Moderate assistance; Adaptive equipment;Assist X2;Additional time  Stand to Sit: Moderate assistance;Assist X2;Additional time; Adaptive equipment  Bed to Chair: Total assistance; Adaptive equipment; Additional time;Assist X2 Glenn Melton)     PT Exercises  Exercise Treatment: Pt performed BLE x10 reps ankle pumps, LAQ, marches, hip abduction     Safety Devices  Type of Devices: All denise prominences offloaded; Patient at risk for falls; All fall risk precautions in place;Call light within reach; Chair alarm in place;Gait belt;Nurse notified; Left in chair  Restraints  Restraints Initially in Place: No     Goals  Short Term Goals  Time Frame for Short Term Goals: 3/20-- 3/14 All goals continue  Short Term Goal 1: Pt will complete bed mobility with mod A x 1  Short Term Goal 2: Pt will sit to stand with min A x 1  Short Term Goal 3: Pt will ambulate x 15' with min A x 2 with RW  Short Term Goal 4: Pt will complete B LE exercises to improve ROM and mobility by 3/15  Patient Goals   Patient Goals : to go home    Education  Patient Education  Education Given To: Patient  Education Provided: Role of Therapy;Plan of Care;Home Exercise Program;Transfer Training  Education Provided Comments: Pt educated on BLE HEP  Education Method: Verbal  Education Outcome: Verbalized understanding;Continued education needed    Therapy Time   Individual Concurrent Group Co-treatment   Time In 1602         Time Out 1635         Minutes 33         Timed Code Treatment Minutes: 33 Minutes     If pt is unable to be seen after this session, please let this note serve as discharge summary. Please see case management note for discharge disposition. Thank you.    Ximena Cesar,SPT

## 2023-03-14 NOTE — PROGRESS NOTES
Hospitalist Progress Note      PCP: Michelle Kitchen MD    Date of Admission: 3/12/2023    Chief Complaint:     Hospital Course:     Subjective:     Patient feels okay today. No fevers, chills or rigors. No abdominal pain, nausea vomiting. No fevers    Medications:  Reviewed    Infusion Medications    sodium chloride       Scheduled Medications    cefTRIAXone (ROCEPHIN) IV  2,000 mg IntraVENous Q24H    simethicone  80 mg Oral 4x Daily    carbidopa-levodopa  1 tablet Oral BID    pantoprazole  40 mg Oral QAM AC    sodium chloride flush  10 mL IntraVENous 2 times per day    enoxaparin  30 mg SubCUTAneous Daily     PRN Meds: sodium chloride flush, sodium chloride, ondansetron **OR** ondansetron, senna      Intake/Output Summary (Last 24 hours) at 3/14/2023 3747  Last data filed at 3/14/2023 0549  Gross per 24 hour   Intake 920.22 ml   Output 200 ml   Net 720.22 ml         Physical Exam Performed:    BP (!) 146/80   Pulse 56   Temp 97.8 °F (36.6 °C) (Oral)   Resp 18   Ht 5' 7\" (1.702 m)   Wt 106 lb 7.7 oz (48.3 kg)   SpO2 94%   BMI 16.68 kg/m²     General appearance: No apparent distress, appears stated age and cooperative. HEENT: Pupils equal, round, and reactive to light. Conjunctivae/corneas clear. Neck: Supple, with full range of motion. No jugular venous distention. Trachea midline. Respiratory:  Normal respiratory effort. Clear to auscultation, bilaterally without Rales/Wheezes/Rhonchi. Cardiovascular: Regular rate and rhythm with normal S1/S2 without murmurs, rubs or gallops. Abdomen: Soft, non-tender, non-distended with normal bowel sounds. Musculoskeletal: No clubbing, cyanosis or edema bilaterally. Full range of motion without deformity. Skin: Skin color, texture, turgor normal.  No rashes or lesions. Neurologic:  Neurovascularly intact without any focal sensory/motor deficits.  Cranial nerves: II-XII intact, grossly non-focal.  Psychiatric: Alert and oriented, thought content appropriate, normal insight  Capillary Refill: Brisk, 3 seconds, normal   Peripheral Pulses: +2 palpable, equal bilaterally       Labs:   Recent Labs     03/12/23  1901 03/13/23  0649 03/14/23  0637   WBC 4.7 2.9* 3.1*   HGB 12.8 10.9* 10.4*   HCT 39.3 33.5* 31.7*   * 83* 86*       Recent Labs     03/12/23  1901 03/13/23  0649 03/14/23  0637   * 136 135*   K 3.8 3.6 3.5   CL 95* 103 104   CO2 27 24 24   BUN 24* 24* 23*   CREATININE 0.9 0.8 0.7   CALCIUM 9.0 8.4 8.2*       Recent Labs     03/12/23  1901 03/13/23  0649 03/14/23  0637   * 181* 56*   * 13 11   BILIDIR  --  0.7* <0.2   BILITOT 1.7* 1.1* 0.5   ALKPHOS 573* 401* 320*       Recent Labs     03/12/23  1901   INR 1.32*       Recent Labs     03/12/23  1901   TROPONINI 0.07*         Urinalysis:      Lab Results   Component Value Date/Time    NITRU Negative 03/12/2023 08:50 PM    WBCUA None seen 03/12/2023 08:50 PM    BACTERIA 4+ 06/27/2020 04:00 PM    RBCUA >100 03/12/2023 08:50 PM    BLOODU MODERATE 03/12/2023 08:50 PM    SPECGRAV 1.025 03/12/2023 08:50 PM    GLUCOSEU Negative 03/12/2023 08:50 PM       Radiology:  CT ABDOMEN PELVIS WO CONTRAST Additional Contrast? None   Final Result   Evaluation of abdominal viscera is limited as described above. Visualized portions of the common duct are normal in caliber. Gaseous distention of large bowel, which may reflect ileus. Nonobstructing calculus seen within the right kidney. Large staghorn   calculus in the left kidney. Infrarenal abdominal aortic aneurysm measuring 3.7 cm. See recommendations   below. There is a consolidation seen within the lower lungs bilaterally, greater on   the right, the compatible with pneumonia and/or aspiration. Small bilateral pleural effusions, greater on the right. RECOMMENDATIONS:   3.7 cm infrarenal abdominal aortic aneurysm. Recommend follow-up every 2   years. Reference: J Am Segundo Radiol 7716;[id number].             CT Head W/O Contrast   Final Result   Head CT: No acute intracranial abnormality detected. Cervical spine CT: No acute fracture or traumatic malalignment. CT C-Spine W/O Contrast   Final Result   Head CT: No acute intracranial abnormality detected. Cervical spine CT: No acute fracture or traumatic malalignment. XR CHEST PORTABLE   Final Result   Chronic obstructive lung changes with a questionable small infiltrate or   atelectasis along the right lung base medially which is more apparent. Stable mild cardiomegaly             IP CONSULT TO HOSPITALIST  IP CONSULT TO GI    Assessment/Plan:      -Acute metabolic encephalopathy dt infection. .  improved    -E. coli bacteremia--presumed GI source--continue IV Rocephin--follow-up on sensitivities. .    -Transaminitis--improving--acute hepatitis panel negative-liver and biliary ducts normal on CT although not completely visualized --- Tylenol level not elevated---  GI consult appreciated--LFTs improving    -presumed Viral illness . fever/cough/report of diarrhea prior to admission--clinically improving. Lucyann Push   COVID and influenza negative    -Pancytopenia,acute--suspect due to infection--continue to monitor    -Physical deconditioning--continue PT and OT    -Dehydration-improved with IV fluids    -Parkinson's disease--continue Sinemet    -GERD--continue PPI    -AAA 3.7 cm-incidental finding on CT abdomen-follow outpatient with routine imaging every 2 years            DVT Prophylaxis: Lovenox  Diet: ADULT DIET; Easy to Chew  ADULT ORAL NUTRITION SUPPLEMENT; Lunch, Dinner, PM Snack; Standard High Calorie/High Protein Oral Supplement  Code Status: Full Code  PT/OT Eval Status: Active and ongoing    Dispo -potential discharge to SNF in 24 hours--pending blood cultures and sensitivities          Elton Burciaga MD

## 2023-03-14 NOTE — PLAN OF CARE
Problem: Discharge Planning  Goal: Discharge to home or other facility with appropriate resources  Outcome: Progressing     Problem: Skin/Tissue Integrity  Goal: Absence of new skin breakdown  Description: 1. Monitor for areas of redness and/or skin breakdown  2. Assess vascular access sites hourly  3. Every 4-6 hours minimum:  Change oxygen saturation probe site  4. Every 4-6 hours:  If on nasal continuous positive airway pressure, respiratory therapy assess nares and determine need for appliance change or resting period. Outcome: Progressing     Problem: Confusion  Goal: Confusion, delirium, dementia, or psychosis is improved or at baseline  Description: INTERVENTIONS:  1. Assess for possible contributors to thought disturbance, including medications, impaired vision or hearing, underlying metabolic abnormalities, dehydration, psychiatric diagnoses, and notify attending LIP  2. [place] high risk fall precautions, as indicated  3. Provide frequent short contacts to provide reality reorientation, refocusing and direction  4. Decrease environmental stimuli, including noise as appropriate  5. Monitor and intervene to maintain adequate nutrition, hydration, elimination, sleep and activity  6. If unable to ensure safety without constant attention obtain sitter and review sitter guidelines with assigned personnel  7.  Initiate Psychosocial CNS and Spiritual Care consult, as indicated  Outcome: Progressing     Problem: Safety - Adult  Goal: Free from fall injury  Outcome: Progressing     Problem: Neurosensory - Adult  Goal: Achieves stable or improved neurological status  Outcome: Progressing     Problem: ABCDS Injury Assessment  Goal: Absence of physical injury  Outcome: Progressing     Problem: Hematologic - Adult  Goal: Maintains hematologic stability  Outcome: Progressing     Problem: Metabolic/Fluid and Electrolytes - Adult  Goal: Electrolytes maintained within normal limits  Outcome: Progressing     Problem: Infection - Adult  Goal: Absence of infection during hospitalization  Outcome: Progressing     Problem: Infection - Adult  Goal: Absence of infection at discharge  Outcome: Progressing

## 2023-03-14 NOTE — PROGRESS NOTES
Occupational Therapy  Facility/Department: [facility] B3 - MED SURG  Daily Treatment Note  NAME: Miller Mckinley  : 1935  MRN: [id number]    Date of Service: 3/14/2023    Discharge Recommendations:  Subacute/Skilled Nursing Facility       Patient Diagnosis(es): The primary encounter diagnosis was Pneumonia due to infectious organism, unspecified laterality, unspecified part of lung. Diagnoses of Elevated troponin, Urinary tract infection with hematuria, site unspecified, Septicemia ([address] and Transaminitis were also pertinent to this visit. Assessment    Assessment: Pt tolerated OT session well, pleasant throughout. Pt perform bed mobility with Max Ax2 to sit EOB. Pt sat EOB with Max A progressing to SPV with use of bed rails. Pt perform STS from EOB with Mod Ax2, transferred from bed>chair with Stedy. PT performed one additional STS from chair>Stedy, maintained stance with Max Ax2 in [place] for about 1 min prior to fatigue. Pt returned to chair at EOS to complete therex. Pt performing below baseline and would continue to benefit from skilled OT while in acute setting. Recommend SNF upon d/c for further OT needs. Activity Tolerance: Patient tolerated treatment well;Patient limited by endurance  [facility]   Co-tx collaboration this date to safely meet goals and will have better occupational performance outcomes with in a co-treatment than 1:1 session. Plan   Occupational Therapy Plan  Times Per Week: 3-5x/ week  Current Treatment Recommendations: ROM;Balance training;Functional mobility training; Endurance training; Safety education & training;Positioning;Self-Care / ADL     Restrictions  Restrictions/Precautions  Restrictions/Precautions: Up as Tolerated; Fall Risk  Required Braces or Orthoses?: No  Position Activity Restriction  Other position/activity restrictions: purewick, O2    Subjective   Subjective  Subjective: Pt resting in bed upon entry, agreeable Still awaiting further stabilization for him to be placed on [facility]. He is still on [facility]'s list.    to therapy. Requesting to sit in chair  Pain: denies pain at rest  Orientation  Overall Orientation Status: Within Normal Limits  Orientation Level: Oriented X4  Pain: No c/o pain  Cognition  Overall Cognitive Status: Exceptions  Arousal/Alertness: Appropriate responses to stimuli  Following Commands: Follows one step commands with increased time  Attention Span: Appears intact  Memory: Decreased recall of precautions  Safety Judgement: Decreased awareness of need for safety;Decreased awareness of need for assistance  Insights: Decreased awareness of deficits  Initiation: Requires cues for some  Sequencing: Requires cues for some        Objective    Vitals  Vitals  Heart Rate: 71  Heart Rate Source: Monitor  BP: (!) 151/81  BP Location: Left upper arm  BP Method: Automatic  Patient Position: Semi fowlers  MAP (Calculated): 104  SpO2: 95 %  O2 Device: Nasal cannula (2L O2)  Bed Mobility Training  Bed Mobility Training: Yes  Overall Level of Assistance: Maximum assistance;Assist X2;Additional time (HOB slightly elevated, use of bedrails)  Supine to Sit: Maximum assistance;Assist X2;Additional time  Sit to Supine: Other (comment) (pt up in chair at EOS)  Scooting: Maximum assistance;Assist X2  Balance  Sitting: Impaired (posterior lean unsupported, able to support self with bedrails. Pt states she has carpal tunnel and isn't able to hold self up long)  Sitting - Static: Supported sitting; Fair (occasional)  Sitting - Dynamic: Supported sitting;Poor (constant support)  Standing: Impaired  Standing - Static: Constant support;Poor  Standing - Dynamic: Poor;Constant support  Transfer Training  Transfer Training: Yes  Overall Level of Assistance: Moderate assistance;Assist X2;Adaptive equipment; Additional time Vivian Keith)  Interventions: Manual cues; Verbal cues; Safety awareness training  Sit to Stand: Moderate assistance; Adaptive equipment;Assist X2;Additional time  Stand to Sit: Moderate assistance;Assist X2;Additional time;Adaptive equipment  Bed to Chair: Total assistance; Adaptive equipment; Additional time;Assist X2 Venita Estrada)     ADL  Grooming: Maximum assistance  Grooming Skilled Clinical Factors: comb hair & wash face in chair  LE Dressing: Dependent/Total  LE Dressing Skilled Clinical Factors: socks  Toileting: Dependent/Total  Toileting Skilled Clinical Factors: purewick  OT Exercises  Exercise Treatment: x10 BUE AROM therex  A/AROM Exercises: finger flexion/extension, wrist flexion/extension     Safety Devices  Type of Devices: All denise prominences offloaded; Patient at risk for falls; All fall risk precautions in place;Call light within reach; Chair alarm in place;Gait belt;Nurse notified; Left in chair  Restraints  Restraints Initially in Place: No     Patient Education  Education Given To: Patient  Education Provided: Role of Therapy;Plan of Care;Precautions  Education Provided Comments: disease speccific: importance of use of RED/nurse call light for assist with ADL & transfers, OOB with staff assist;  Education Method: Demonstration;Verbal  Barriers to Learning: Cognition  Education Outcome: Verbalized understanding;Continued education needed  Disease Specific Education: Pt educated on importance of OOB mobility, prevention of complications of bedrest, and general safety during hospitalization.  Pt verbalized understanding    Goals  Short Term Goals  Time Frame for Short Term Goals: 1 week(3-20-23); 3/14 all goals ongoing  Short Term Goal 1: mod assist of 1 with functional/toilet transfers by 3-20-23  Short Term Goal 2: mod assist of 1 sit<-->stand by 3-20-23  Short Term Goal 3: min assist with UE light grooming by 3-18-23  Short Term Goal 4: tolerate 10 reps BUE AROM exercises; completed 2 sets of x10 reps therex with SPV to maintain attention  Patient Goals   Patient goals : \"go home\"       Therapy Time   Individual Concurrent Group Co-treatment   Time In       1602   Time Out       1637   Minutes       35   Timed Code Treatment Minutes: 35 Minutes     If pt is unable to be seen after this session, please let this note serve as discharge summary. Please see case management note for discharge disposition. Thank you.     Danelle OSBORN

## 2023-03-14 NOTE — PLAN OF CARE
Problem: Discharge Planning  Goal: Discharge to home or other facility with appropriate resources  Outcome: Progressing     Problem: Skin/Tissue Integrity  Goal: Absence of new skin breakdown  Description: 1. Monitor for areas of redness and/or skin breakdown  2. Assess vascular access sites hourly  3. Every 4-6 hours minimum:  Change oxygen saturation probe site  4. Every 4-6 hours:  If on nasal continuous positive airway pressure, respiratory therapy assess nares and determine need for appliance change or resting period. Outcome: Progressing     Problem: Confusion  Goal: Confusion, delirium, dementia, or psychosis is improved or at baseline  Description: INTERVENTIONS:  1. Assess for possible contributors to thought disturbance, including medications, impaired vision or hearing, underlying metabolic abnormalities, dehydration, psychiatric diagnoses, and notify attending LIP  2. [place] high risk fall precautions, as indicated  3. Provide frequent short contacts to provide reality reorientation, refocusing and direction  4. Decrease environmental stimuli, including noise as appropriate  5. Monitor and intervene to maintain adequate nutrition, hydration, elimination, sleep and activity  6. If unable to ensure safety without constant attention obtain sitter and review sitter guidelines with assigned personnel  7.  Initiate Psychosocial CNS and Spiritual Care consult, as indicated  Outcome: Progressing     Problem: Safety - Adult  Goal: Free from fall injury  Outcome: Progressing     Problem: Neurosensory - Adult  Goal: Achieves stable or improved neurological status  Outcome: Progressing     Problem: Cardiovascular - Adult  Goal: Maintains optimal cardiac output and hemodynamic stability  Outcome: Progressing  Goal: Absence of cardiac dysrhythmias or at baseline  Outcome: Progressing     Problem: Skin/Tissue Integrity - Adult  Goal: Skin integrity remains intact  Outcome: Progressing     Problem: Musculoskeletal - Adult  Goal: Return mobility to safest level of function  Outcome: Progressing  Goal: Return ADL status to a safe level of function  Outcome: Progressing     Problem: Genitourinary - Adult  Goal: Absence of urinary retention  Outcome: Progressing  Goal: Urinary catheter remains patent  Outcome: Progressing     Problem: Infection - Adult  Goal: Absence of infection at discharge  Outcome: Progressing  Goal: Absence of infection during hospitalization  Outcome: Progressing     Problem: Metabolic/Fluid and Electrolytes - Adult  Goal: Electrolytes maintained within normal limits  Outcome: Progressing     Problem: Hematologic - Adult  Goal: Maintains hematologic stability  Outcome: Progressing     Problem: ABCDS Injury Assessment  Goal: Absence of physical injury  Outcome: Progressing

## 2023-03-14 NOTE — CARE COORDINATION
Chart reviewed. AMS (resolved), transaminitis, E. Coli bacteremia. Management per GI and IM. IV ABX. Pt from home alone. Grandson helps. PTOT rec SNF. Referral to [facility], per pt preference. [facility] accepted. Precert still pending. CM will continue to follow.  Lakesha Christian RN

## 2023-03-14 NOTE — PROGRESS NOTES
PROGRESS NOTE    HPI: Braulio Medrano is a(n)80 y.o. female admitted for work-up and treatment for Septicemia ([facility] [address] [A41.9]  Transaminitis [R74.01]  Elevated troponin [R77.8]  Urinary tract infection with hematuria, site unspecified [N39.0, S58.7]  Acute metabolic encephalopathy [W00.10]  Pneumonia due to infectious organism, unspecified laterality, unspecified part of lung [J18.9]. We are following for elevated LFTs. Subjective:     GI symptoms resolved. Tolerating diet. Feeling well. Objective:     I/O last 3 completed shifts: In: 1406.1 [P.O.:720; I.V.:637; IV Piggyback:49.1]  Out: 335 [Urine:335]      /81   Pulse 57   Temp 97.8 °F (36.6 °C) (Oral)   Resp 18   Ht 5' 7\" (1.702 m)   Wt 106 lb 7.7 oz (48.3 kg)   SpO2 95%   BMI 16.68 kg/m²     Physical Exam:  HEENT: anicteric sclera, oropharyngeal membranes pink and moist.  Cor: RRR  Lungs: non-labored, no respiratory distress  Abdomen: soft,  NT. No ascites. No hepatomegaly or splenomegaly  Extremities: BLE contractured  Neuro: alert and oriented x 3      Results:   Lab Results   Component Value Date    ALT 11 03/14/2023    AST 56 (H) 03/14/2023    ALKPHOS 320 (H) 03/14/2023    BILIDIR <0.2 03/14/2023    PROT 4.5 (L) 03/14/2023    LABALBU 2.3 (L) 03/14/2023    INR 1.32 (H) 03/12/2023    LIPASE 12.0 (L) 03/12/2023     Lab Results   Component Value Date    WBC 3.1 (L) 03/14/2023    HGB 10.4 (L) 03/14/2023    HCT 31.7 (L) 03/14/2023    MCV 92.4 03/14/2023    PLT 86 (L) 03/14/2023     BUN/Cr/glu/ALT/AST/amyl/lip:  23/0.7/--/11/56/--/-- (03/14 0183)  CT ABDOMEN PELVIS WO CONTRAST Additional Contrast? None    Result Date: 3/12/2023  Evaluation of abdominal viscera is limited as described above. Visualized portions of the common duct are normal in caliber. Gaseous distention of large bowel, which may reflect ileus. Nonobstructing calculus seen within the right kidney.   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 Infrarenal abdominal aortic aneurysm measuring 3.7 cm. See recommendations below. There is a consolidation seen within the lower lungs bilaterally, greater on the right, the compatible with pneumonia and/or aspiration. Small bilateral pleural effusions, greater on the right. RECOMMENDATIONS: 3.7 cm infrarenal abdominal aortic aneurysm. Recommend follow-up every 2 years. Reference: J Am Segundo Radiol 2046;51:304-676. CT Head W/O Contrast    Result Date: 3/12/2023  Head CT: No acute intracranial abnormality detected. Cervical spine CT: No acute fracture or traumatic malalignment. CT C-Spine W/O Contrast    Result Date: 3/12/2023  Head CT: No acute intracranial abnormality detected. Cervical spine CT: No acute fracture or traumatic malalignment. XR CHEST PORTABLE    Result Date: 3/12/2023  Chronic obstructive lung changes with a questionable small infiltrate or atelectasis along the right lung base medially which is more apparent. Stable mild cardiomegaly         Impression:  1. Elevated LFTs. Continue to improve. Likely secondary to viral infection given diarrhea, vomiting, fever. DILI also possible. No evidence of biliary obstruction on CT. Acute hep panel negative. Plan:  Labs worsening with resolution of clinical symptoms. No further work-up needed at this time. GI will sign off. Please do not hesitate to call with questions or concerns.       Electronically signed by: RACHEL Mckeon 3/14/2023 11:40 AM      diabetes

## 2023-03-14 NOTE — PROGRESS NOTES
0115: Patient has not voided since stewart was taken out, bladder scan vol less than 50 on 2 occasions, PO  intake encouraged with patent still not voiding, hospitalist notified with new order.

## 2023-03-15 ENCOUNTER — APPOINTMENT (OUTPATIENT)
Dept: GENERAL RADIOLOGY | Age: 88
DRG: 871 | End: 2023-03-15
Payer: COMMERCIAL

## 2023-03-15 VITALS
WEIGHT: 111.55 LBS | OXYGEN SATURATION: 95 % | SYSTOLIC BLOOD PRESSURE: 132 MMHG | RESPIRATION RATE: 18 BRPM | TEMPERATURE: 98.8 F | HEART RATE: 58 BPM | DIASTOLIC BLOOD PRESSURE: 85 MMHG | BODY MASS INDEX: 17.51 KG/M2 | HEIGHT: 67 IN

## 2023-03-15 LAB
ALBUMIN SERPL-MCNC: 2.1 G/DL (ref 3.4–5)
ALP SERPL-CCNC: 252 U/L (ref 40–129)
ALT SERPL-CCNC: 41 U/L (ref 10–40)
ANION GAP SERPL CALCULATED.3IONS-SCNC: 3 MMOL/L (ref 3–16)
AST SERPL-CCNC: 26 U/L (ref 15–37)
BACTERIA BLD CULT: ABNORMAL
BACTERIA BLD CULT: ABNORMAL
BASOPHILS # BLD: 0 K/UL (ref 0–0.2)
BASOPHILS NFR BLD: 0.3 %
BILIRUB DIRECT SERPL-MCNC: <0.2 MG/DL (ref 0–0.3)
BILIRUB INDIRECT SERPL-MCNC: ABNORMAL MG/DL (ref 0–1)
BILIRUB SERPL-MCNC: 0.3 MG/DL (ref 0–1)
BUN SERPL-MCNC: 16 MG/DL (ref 7–20)
CALCIUM SERPL-MCNC: 8.1 MG/DL (ref 8.3–10.6)
CHLORIDE SERPL-SCNC: 104 MMOL/L (ref 99–110)
CO2 SERPL-SCNC: 25 MMOL/L (ref 21–32)
CREAT SERPL-MCNC: 0.6 MG/DL (ref 0.6–1.2)
DEPRECATED RDW RBC AUTO: 14.2 % (ref 12.4–15.4)
EKG ATRIAL RATE: 57 BPM
EKG DIAGNOSIS: NORMAL
EKG P-R INTERVAL: 138 MS
EKG Q-T INTERVAL: 424 MS
EKG QRS DURATION: 106 MS
EKG QTC CALCULATION (BAZETT): 412 MS
EKG R AXIS: -40 DEGREES
EKG T AXIS: 9 DEGREES
EKG VENTRICULAR RATE: 57 BPM
EOSINOPHIL # BLD: 0 K/UL (ref 0–0.6)
EOSINOPHIL NFR BLD: 0.7 %
GFR SERPLBLD CREATININE-BSD FMLA CKD-EPI: >60 ML/MIN/{1.73_M2}
GLUCOSE SERPL-MCNC: 73 MG/DL (ref 70–99)
HCT VFR BLD AUTO: 30.5 % (ref 36–48)
HGB BLD-MCNC: 10.1 G/DL (ref 12–16)
LYMPHOCYTES # BLD: 0.2 K/UL (ref 1–5.1)
LYMPHOCYTES NFR BLD: 9.8 %
MCH RBC QN AUTO: 30.4 PG (ref 26–34)
MCHC RBC AUTO-ENTMCNC: 33.1 G/DL (ref 31–36)
MCV RBC AUTO: 92 FL (ref 80–100)
MONOCYTES # BLD: 0.3 K/UL (ref 0–1.3)
MONOCYTES NFR BLD: 11.8 %
NEUTROPHILS # BLD: 1.7 K/UL (ref 1.7–7.7)
NEUTROPHILS NFR BLD: 77.4 %
ORGANISM: ABNORMAL
ORGANISM: ABNORMAL
PLATELET # BLD AUTO: 77 K/UL (ref 135–450)
PMV BLD AUTO: 9.7 FL (ref 5–10.5)
POTASSIUM SERPL-SCNC: 3.6 MMOL/L (ref 3.5–5.1)
PROT SERPL-MCNC: 4.2 G/DL (ref 6.4–8.2)
RBC # BLD AUTO: 3.31 M/UL (ref 4–5.2)
SODIUM SERPL-SCNC: 132 MMOL/L (ref 136–145)
WBC # BLD AUTO: 2.2 K/UL (ref 4–11)

## 2023-03-15 PROCEDURE — 2580000003 HC RX 258: Performed by: INTERNAL MEDICINE

## 2023-03-15 PROCEDURE — 36415 COLL VENOUS BLD VENIPUNCTURE: CPT

## 2023-03-15 PROCEDURE — 6370000000 HC RX 637 (ALT 250 FOR IP): Performed by: INTERNAL MEDICINE

## 2023-03-15 PROCEDURE — 2700000000 HC OXYGEN THERAPY PER DAY

## 2023-03-15 PROCEDURE — 85025 COMPLETE CBC W/AUTO DIFF WBC: CPT

## 2023-03-15 PROCEDURE — 92526 ORAL FUNCTION THERAPY: CPT

## 2023-03-15 PROCEDURE — 2580000003 HC RX 258: Performed by: HOSPITALIST

## 2023-03-15 PROCEDURE — 92610 EVALUATE SWALLOWING FUNCTION: CPT

## 2023-03-15 PROCEDURE — 74230 X-RAY XM SWLNG FUNCJ C+: CPT

## 2023-03-15 PROCEDURE — 93010 ELECTROCARDIOGRAM REPORT: CPT | Performed by: INTERNAL MEDICINE

## 2023-03-15 PROCEDURE — 80076 HEPATIC FUNCTION PANEL: CPT

## 2023-03-15 PROCEDURE — 6360000002 HC RX W HCPCS: Performed by: HOSPITALIST

## 2023-03-15 PROCEDURE — 80048 BASIC METABOLIC PNL TOTAL CA: CPT

## 2023-03-15 PROCEDURE — 94761 N-INVAS EAR/PLS OXIMETRY MLT: CPT

## 2023-03-15 PROCEDURE — 6360000002 HC RX W HCPCS: Performed by: INTERNAL MEDICINE

## 2023-03-15 PROCEDURE — 92611 MOTION FLUOROSCOPY/SWALLOW: CPT

## 2023-03-15 RX ORDER — CEFDINIR 300 MG/1
300 CAPSULE ORAL 2 TIMES DAILY
Qty: 14 CAPSULE | Refills: 0 | DISCHARGE
Start: 2023-03-16 | End: 2023-03-23

## 2023-03-15 RX ADMIN — ENOXAPARIN SODIUM 30 MG: 100 INJECTION SUBCUTANEOUS at 09:12

## 2023-03-15 RX ADMIN — CARBIDOPA AND LEVODOPA 1 TABLET: 25; 100 TABLET ORAL at 09:12

## 2023-03-15 RX ADMIN — CEFTRIAXONE 2000 MG: 2 INJECTION, POWDER, FOR SOLUTION INTRAMUSCULAR; INTRAVENOUS at 09:20

## 2023-03-15 RX ADMIN — SODIUM CHLORIDE: 9 INJECTION, SOLUTION INTRAVENOUS at 09:20

## 2023-03-15 RX ADMIN — SODIUM CHLORIDE, PRESERVATIVE FREE 10 ML: 5 INJECTION INTRAVENOUS at 09:15

## 2023-03-15 NOTE — PROCEDURES
Speech Language Pathology  Modified Barium Swallow Study  Facility/Department: [facility] B3 - MED SURG      Recommendations:  Diet recommendation: IDDSI 5 Minced and moist Solids; IDDSI 3 Moderately Thick (honey) Liquids; Meds whole or crushed in puree  Risk management: upright for all intake, stay upright for at least 30 mins after intake, small bites/sips, assist feed, 1:1 supervision with intake, oral care 2-3x/day to reduce adverse affects in the event of aspiration, alternate bites/sips, slow rate of intake, general GERD precautions, general aspiration precautions, and hold PO and contact SLP if s/s of aspiration or worsening respiratory status develop. Lady Young  : 1935 (80 y.o.)   MRN: [id number]  ROOM: [address] [place] [address]  ADMISSION DATE: 3/12/2023  PATIENT DIAGNOSIS(ES): Septicemia ([facility] [A41.9]  Transaminitis [R74.01]  Elevated troponin [R77.8]  Urinary tract infection with hematuria, site unspecified [N39.0, S95.9]  Acute metabolic encephalopathy [T65.03]  Pneumonia due to infectious organism, unspecified laterality, unspecified part of lung [J18.9]  Chief Complaint   Patient presents with    Altered Mental Status     From home, hx of parkinson, possibly dehydrated. Altered per family.   Continues to ask for water appears very dry, very poor skin turgor         Patient Active Problem List    Diagnosis Date Noted    Acute metabolic encephalopathy 8056    Numbness 2022    Polyneuropathy 2022    Demyelinating disease ([facility] [place] [facility] 2022    MS (multiple sclerosis) ([facility] [place] [facility] 2022    History of rectal bleeding 06/10/2021    Sensorineural hearing loss, bilateral 2021    Iron deficiency anemia 2021    Parkinsonian tremor ([facility] [place] [facility] 10/06/2020    HTN (hypertension), benign 10/06/2020    BRBPR (bright red blood per rectum)     Acute blood loss anemia     Parkinson disease, symptomatic (HCC)     NSAID long-term use     Acute pain of right shoulder     Blood per rectum 06/27/2020    Moderate malnutrition ([address] 03/11/2020    Status post total right knee replacement 03/10/2020    S/P total knee arthroplasty, right 03/10/2020    Weakness generalized 05/30/2019    Environmental and seasonal allergies 04/03/2019    Irritable bowel syndrome with constipation 08/29/2018    Primary osteoarthritis of both knees 03/31/2017    B12 deficiency 10/28/2014    History of nonadherence to medical treatment 10/11/2014    Incomplete tear of right rotator cuff 10/10/2014    Biceps rupture, proximal 07/21/2014    Staghorn calculus 08/30/2013    Vitamin D deficiency 07/24/2013    Carpal tunnel syndrome of left wrist 10/06/2011    Mild intermittent asthma 10/06/2011    Goiter 10/06/2011     Past Medical History:   Diagnosis Date    Anxiety 7/20/2012    Arthritis     Asthma     Carpal tunnel syndrome of left wrist 10/6/2011    Depression 5/2/2012    GERD (gastroesophageal reflux disease) 9/4/2013    Hematuria     chronic from stone    History of nonadherence to medical treatment 10/11/2014    Hypertension     Kidney stone     saw Dr Jerome - 6 cm stone    Thyroid disease     goiter    Tight ring on finger     unable to remove - left hand    Wears dentures     full upper    Wears glasses     for reading     Past Surgical History:   Procedure Laterality Date    CARPAL TUNNEL RELEASE Right 09/14/2011    OPEN REDUCTION INTERNAL FIXATION RIGHT WRIST (RUSSO'S FRACTURE)    COLONOSCOPY N/A 7/1/2020    COLON (2:00) performed by Nikki [facility] DO at [facility] (CERVIX STATUS UNKNOWN)      SIGMOIDOSCOPY N/A 6/30/2020    FLEX SIG W/ANES.  performed by Nikki Chase DO at [address] [facility] Right 3/10/2020    RIGHT TOTAL KNEE REPLACEMENT       KANG & NEPHEW performed by Daniel Chan MD at [place] [address] 6/30/2020    EGD BIOPSY performed by Nikki Chase DO at [facility] 6/30/2020    EGD DILATION BALLOON performed by Krystina Teresa DO at [facility]     Allergies   Allergen Reactions    Hydrochlorothiazide      Constipation and severe leg cramps         Current Diet Consistency: Minced and moist diet with thin liquids and meds whole or crushed in puree recommended after BSE earlier this date pending MBSS results/recs    Date of Prior Study: n/a  Type of Study: n/a  Results of Prior Study: n/a    Recent CXR (3/14/23): Impression   Mild right lower lobe infiltrate. COPD changes. Gaseous distention of the bowel in the upper abdomen, incompletely imaged. Superior mediastinal goiter. Patient Complaints/Reason for Referral:  Demar Parks was referred for a MBS to assess the efficiency of his/her swallow function, assess for aspiration, and to make recommendations regarding safe dietary consistencies, effective compensatory strategies, and safe eating environment. Pain   Patient Currently in Pain: No    General Comments:   Per MD H&P: \"Danae Escalera is a 80 y.o. female with a PMHx of HTN, who presented to the [facility] ED for evaluation of      Pt brought in by son today for increased lethargy and altered mental status. Pt notes increased fatigue today with episode of diarrhea yesterday along with urinary incontinence, which is unusual for her. Her PO intake has been poor. She denies abdominal pain. Took 2 tabs of Tylenol for pain to the neck, feels she pulled a muscle getting into bed several days ago. She does have a hx of chronic upper extremity resting tremor and difficulty with extension of the elbows and fingers. In the ER, mildly hypertensive and febrile to 100.7 °F.  Labs show sodium 131, elevated transaminases ALT//395 respectively, alk phos 573, and bili 1.7. INR 1.32. Acetaminophen level is negative. Troponin mildly elevated 0.07. No leukocytosis, lactic WNL. UA w/ mod blood, without evidence of infection. Case discussed with ED BRIAN for admission. External medical records reviewed\". Medical record review/interview:   Predisposing dysphagia risk factors: Parkinson's Disease and Age  Clinical signs of possible chronic dysphagia: N/A  Precipitating dysphagia risk factors: reduced physical mobility, increased O2 demands, and suspected disuse atrophy       Impressions:  Treatment Dx: oropharyngeal dysphagia  Radiologist: Dr. Eileen Jones  Referring MD: Dr. Dodie Ortiz:     Oral Preparation / Oral Phase  Impaired  Oral Phase - Major Contributing Deficits  Poor Mastication: Regular   Weak Lingual Manipulation: All  Lingual Pumping: Puree, Regular   Reduced Posterior Propulsion: Puree, Regular   Holding Of Bolus: Noted with Puree x1  Reduced Bolus Control: All  Decreased Bolus Cohesion: All  Premature Bolus Loss to Pharynx: All  Oral phase comment: Weak lingual manipulation and reduced bolus control and cohesion resulted in premature bolus loss to the pharynx, particularly with liquids. Pt demonstrated reduced A-P bolus transit with regular solid trials, resulting in mildly prolonged mastication. Pharyngeal Phase  Impaired  Pharyngeal Phase - Major Contributing Deficits  Delayed Swallow Initiation: All  Premature Spillage to Valleculae: All  Premature Spillage to Pyriform: Thin cup sip, Thin straw , Mildly (nectar) thick cup sip , Mildly (nectar) thick straw   Reduced Pharyngeal Peristalsis: All  Reduced Laryngeal Elevation:  All; although suspect age appropriate  Reduced Anterior Laryngeal Movement: All; although suspect age appropriate  Pooling Valleculae: All  Pooling Pyriform: Thin cup sip, Thin straw , Mildly (nectar) thick cup sip , Mildly (nectar) thick straw   Reduced Airway/laryngeal Closure: All  Reduced Tongue Base Retraction:  All; mildly reduced  Shallow Penetration During:  Moderately (honey) thick cup sip  Parital Self-clearing (shallow): Mildly (nectar) thick cup sip   Deep Penetration During: Thin cup sip, Thin straw , Mildly (nectar) thick cup, Mildly (nectar) thick straw   Partial Retrieval (deep): Thin cup sip, Thin straw , Mildly (nectar) thick cup sip , Mildly (nectar) thick straw   Aspiration After: Thin cup sip, Thin straw , Mildly (nectar) thick cup, Mildly (nectar) thick straw   No Cough Reflex: Thin cup sip, Thin straw , Mildly (nectar) thick straw, Mildly (nectar) thick cup  Pharyngeal Residue - Valleculae: All; increased to moderate initially post-swallow with solid PO  Pharyngeal Residue - Pyriform: All; minimal  Fatigue of Mechanism: All  Pharyngeal phase comment: Delayed swallow initiation, reduced hyolaryngeal excursion, and reduced airway/laryngeal closure results in deep penetration during the swallow (*to level of the vocal folds; fills laryngeal vestibule at times) with thin and mildly thick (nectar) liquids that only partially clears (with and without use of chin tuck strategy). Eventual silent aspiration observed after the swallow. Aspirated material does not successfully clear despite cued cough. X1 instance of shallow penetration observed during the swallow with moderately thick (honey) liquid via cup. Penetration partially cleared. No penetration/aspiration noted with solid PO trials. Consistent minimal pharyngeal residue noted post-swallow with all PO trials, increased to moderate initially with solid PO. Majority of this residue successfully cleared with cued second swallow.       Esophageal Phase  Appears WFL when viewed at the cervical level throughout the duration of the study      Aspiration Scale   1 Material does not enter the airway    2 Material enters the airway, remains above the vocal folds, and is ejected from the airway    3 Material enters the airway, remains above the vocal folds, and is not ejected from the airway    4 Material enters the airway, contacts the vocal folds, an is ejected from the airway    5 Material enters the airway, contacts the vocal folds, and is not ejected from the airway    6 Material enters the airway, passes below the vocal folds and is ejected into the larynx or out of the airway    7 Material enters the airway, passes below the vocal folds, and is not ejected from the trachea despite effort   X 8 Material enters the airway, passes below the vocal folds, and no effort is made to eject. Compensatory Swallowing Strategies Attempted: small bites/sips, upright positioning with PO, total assist, alternate bites/sips, occasional double swallow  Postural Changes and/or Swallow Maneuvers Trialed: chin tuck  Patient Position: Lateral and Patient Degrees: 90 degrees, Seated upright in [facility] chair  Consistencies Administered: Thin-cup, straw; Mildly thick (nectar)-cup, straw; Moderately thick (honey)-cup; Puree; Reg Solid      Recommendations:  Diet recommendation: IDDSI 5 Minced and moist Solids; IDDSI 3 Moderately Thick (honey) Liquids; Meds whole or crushed in puree  Risk management: upright for all intake, stay upright for at least 30 mins after intake, small bites/sips, assist feed, 1:1 supervision with intake, oral care 2-3x/day to reduce adverse affects in the event of aspiration, alternate bites/sips, slow rate of intake, general GERD precautions, general aspiration precautions, and hold PO and contact SLP if s/s of aspiration or worsening respiratory status develop.     Safe Swallow Protocol:  Supervision: Close; pt will likely require assistance with meals  Compensatory Swallowing Strategies: HOB 90* and 30\" after meals; small bites/sips; alternate solids/liquids every 3-5 bites; oral care after every meal      Behavior/Cognition/Vision/Hearing:  Behavior/Cognition: alert, cooperative, pleasant  Vision: appears [facility] for evaluation  Hearing: appears [facility] for evaluation    Recommendations/Treatment  Requires SLP Intervention: Yes  D/C Recommendations: SNF; continued ST for dysphagia tx recommended  Postural Changes and/or Swallow Maneuvers: n/a  Referral To: n/a    Recommended Exercises: laryngeal/pharyngeal strengthening exercises  Therapeutic Interventions: diet tolerance monitoring, patient/family education, oral care     Education: Images and recommendations were reviewed with pt following this exam.   Patient Education Response: pt verbalized understanding, would benefit from ongoing reinforcement.      Prognosis for safe diet advancement: fair  Barriers to reach goals: Parkinson's disease, age, generalized weakness, severity of pharyngeal dysphagia  Duration/Frequency of Treatment: 3-5x/week for LOS  Safety Devices in place: Yes  Type of devices: Pt left MBSS in no distress; left with transport      Goals:    Short Term Goals:  Timeframe for Short Term Goals: (5 days, 3/20/23)  Goal 1: The patient will tolerate recommended diet with no clinical s/s of aspiration 5/5  Goal 2: The patient/caregiver will demonstrate understanding of compensatory swallow strategies, for improved swallow safety  Goal 3: The patient will tolerate instrumental assessment when able      Long Term Goals:   Timeframe for Long Term Goals: (7 days, 3/22/23)  Goal 1: The patient will tolerate least restrictive diet with no clinical s/s of aspiration or worsening respiratory/pulmonary status        Therapy Time:   Individual Concurrent Group Co-treatment   Time In 1025         Time Out 1055         Minutes 30; MBSS procedure and dysphagia tx               Signature:  Nimesh Nava M.S. [id number] [facility]  Speech-language pathologist  [id number]

## 2023-03-15 NOTE — PROGRESS NOTES
Pt d/c'd 03/15/23. Removed 1 IV and stopped bleeding. Catheter intact. Pt tolerated well. No redness noted at site. Notified CMU and removed tele box. Reviewed d/c instructions, home meds, and  f/u information utilizing teach-back method.

## 2023-03-15 NOTE — PROGRESS NOTES
Physician Progress Note      PATIENT:               Ricarda Baez  CSN #:                  [id number]  :                       1935  ADMIT DATE:       3/12/2023 6:49 PM  [address] [place] DATE:  RESPONDING  PROVIDER #:        Sam Hudson MD          QUERY TEXT:    Pt admitted with AMS and diarrhea. Pt noted to have E. Coli bacteremia   presumed GI source and viral ilness. If possible, please document in the   progress notes and discharge summary if you are evaluating and /or treating   any of the following: The medical record reflects the following:  Risk Factors: Age, diarrhea, metabolic encephalopathy, parkinsons  Clinical Indicators: WBC 2.2, RR 28, T 100.7, BCx with E. Coli  PN -  \"-presumed Viral illness . fever/cough/report of diarrhea prior to   admission--clinically improving. Loreatha Press COVID and influenza negative  Addendum 2. 03PM  Patient has E. coli bacteremia suspected dt GI source-poa. .. Received 1 dose   of IV Rocephin and azithromycin 3/12. ...... continue IV Rocephin and follow-up   on cultures. \"  ED -  \"Chest x-ray here does show concern for pneumonia and urinalysis concerning   for UTI. Treated with ceftriaxone and azithromycin. \"  CLINICAL IMPRESSION :  1. Pneumonia due to infectious organism, unspecified laterality, unspecified   part of lung  2. Elevated troponin  3. Urinary tract infection with hematuria, site unspecified  4.  Septicemia ([facility]  Treatment: NS Bolus, Rocephin, azithromycin, BCx, serial labs and supportive   care    Thank you,  Buck Montesinos, RN, BSN, CRCR  Options provided:  -- Sepsis secondary to E. coli bacteremia presumed GI source and viral   illness, present on arrival  -- E. coli bacteremia presumed GI source and viral illness without Sepsis  -- Other - I will add my own diagnosis  -- Disagree - Not applicable / Not valid  -- Disagree - Clinically unable to determine / Unknown  -- Refer to Clinical Documentation Reviewer    PROVIDER RESPONSE TEXT:    This patient has sepsis secondary to E. coli bacteremia presumed GI source and   viral illness which was present on arrival.    Query created by: Nadira Lynch on 3/15/2023 10:35 AM      Electronically signed by:   Dangelo Regan MD 3/15/2023 12:10 PM

## 2023-03-15 NOTE — CARE COORDINATION
CM received call from Mariam Oviedo at [facility], precert is back. They are able to accept pt when medically ready for discharge. CM will discuss with provider. Lakesha Garcia RN     Addendum 10:03  [facility] tentatively set up for 15:00. MD, RN and facility aware. Pt currently off the unit having MBS. Discharge will depend on results. CM will follow.  Lakesha Garcia RN

## 2023-03-15 NOTE — CARE COORDINATION
CASE MANAGEMENT DISCHARGE SUMMARY      Discharge to: [facility]    Precertification completed: yes  Hospital Exemption Notification (HENS) completed: yes, Document ID : [id number]    IMM given: (date) 3/15/23    New Durable Medical Equipment ordered/agency:     Transportation:       Medical Transport explained to [employer]. Pt/family voice no agency preference. Agency used: [facility]   time: 15:00   Ambulance form completed: Yes    Confirmed discharge plan with:     Patient: yes     Family:  yes/Jerardo, azeb     Facility/Agency, name:  [facility] faxed   Phone number for report to facility: [phone]     RN, name: Griffin    Note: Discharging nurse to complete [facility], reconcile [facility], and place final copy with patient's discharge packet. RN to ensure that written prescriptions for  Level II medications are sent with patient to the facility as per protocol.

## 2023-03-15 NOTE — DISCHARGE INSTR - COC
Continuity of Care Form    Patient Name: Yahir Matute   :  1935  MRN:  [id number]    Admit date:  3/12/2023  Discharge date:  03/15/23    Code Status Order: Full Code   Advance Directives:     Admitting Physician:  Florentino Staton MD  PCP: Tatianna Nieves MD    Discharging Nurse: [facility] Unit/Room#: 6517/4361-57  Discharging Unit Phone Number: [phone]    Emergency Contact:   Extended Emergency Contact Information  Primary Emergency Contact: Ignacio Stewart Phone: [phone]  Mobile Phone: [phone]  Relation: Grandchild  Preferred language: English   needed? No  Secondary Emergency Contact: mitzi he  Home Phone: [phone]  Relation: Grandchild   needed? No    Past Surgical History:  Past Surgical History:   Procedure Laterality Date    CARPAL TUNNEL RELEASE Right 2011    OPEN REDUCTION INTERNAL FIXATION RIGHT WRIST (RUSSO'S FRACTURE)    COLONOSCOPY N/A 2020    COLON (2:00) performed by Melodie Ibrahim DO at [facility] (CERVIX STATUS UNKNOWN)      SIGMOIDOSCOPY N/A 2020    FLEX SIG W/ANES.  performed by Melodie Ibrahim DO at [address] [place] [address] Right 3/10/2020    RIGHT TOTAL KNEE REPLACEMENT       KANG & NEPHEW performed by Kenia Kumar MD at [facility] 2020    EGD BIOPSY performed by Melodie Ibrahim DO at [facility]  2020    EGD DILATION BALLOON performed by Melodie Ibrahim DO at [address] [facility]       Immunization History:   Immunization History   Administered Date(s) Administered    Influenza, Triv, inactivated, subunit, adjuvanted, IM (Fluad 65 yrs and older) 2019    PPD Test 2008    Pneumococcal Conjugate 13-valent ([facility] [place]) 10/18/2017    Pneumococcal Polysaccharide ([id number]) 1996, 2008    Td, unspecified formulation 2008 Active Problems:  Patient Active Problem List   Diagnosis Code    Carpal tunnel syndrome of left wrist G56.02    Mild intermittent asthma J45.20    Goiter E04.9    Vitamin D deficiency E55.9    Staghorn calculus N20.0    Biceps rupture, proximal S46.119A    Incomplete tear of right rotator cuff M75.111    History of nonadherence to medical treatment Z91.199    B12 deficiency E53.8    Primary osteoarthritis of both knees M17.0    Irritable bowel syndrome with constipation K58.1    Environmental and seasonal allergies J30.89    Weakness generalized R53.1    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    S/P total knee arthroplasty, right Z96.651    Moderate malnutrition (HCC) E44.0    Blood per rectum K62.5    BRBPR (bright red blood per rectum) K62.5    Acute blood loss anemia D62    Parkinson disease, symptomatic (HCC) G20    NSAID long-term use Z79.1    Acute pain of right shoulder M25.511    Parkinsonian tremor (HCC) G20    HTN (hypertension), benign I10    Iron deficiency anemia D50.9    Sensorineural hearing loss, bilateral H90.3    History of rectal bleeding Z87.19    Numbness R20.0    Polyneuropathy G62.9    Demyelinating disease (HCC) G37.9    MS (multiple sclerosis) ([facility] B89    Acute metabolic encephalopathy A15.38       Isolation/Infection:   Isolation            No Isolation          Patient Infection Status       Infection Onset Added Last Indicated Last Indicated By Review Planned Expiration Resolved Resolved By    None active    Resolved    COVID-19 (Rule Out) 03/12/23 03/12/23 03/12/23 COVID-19 & Influenza Combo (Ordered)   03/12/23 Rule-Out Test Resulted    COVID-19 (Rule Out) 06/30/20 06/30/20 06/30/20 COVID-19 (Ordered)   06/30/20 Rule-Out Test Resulted            Nurse Assessment:  Last Vital Signs: BP (!) 148/85   Pulse 62   Temp 97.8 °F (36.6 °C) (Oral)   Resp 18   Ht 5' 7\" (1.702 m)   Wt 111 lb 8.8 oz (50.6 kg)   SpO2 94%   BMI 17.47 kg/m²     Last documented pain score (0-10 scale):    Last Weight:   Wt Readings from Last 1 Encounters:   03/15/23 111 lb 8.8 oz (50.6 kg)     Mental Status:  oriented and alert    IV Access:  - None    Nursing Mobility/ADLs:  Walking   Assisted  Transfer  Assisted  Bathing  Assisted  Dressing  Assisted  Toileting  Assisted  Feeding  Assisted  Med Admin  Assisted  Med Delivery   whole and w/applesauce    Wound Care Documentation and Therapy:  Incision 03/10/20 Knee Anterior;Right (Active)   Number of days: 1100        Elimination:  Continence: Bowel: No  Bladder: No  Urinary Catheter: None   Colostomy/Ileostomy/Ileal Conduit: No       Date of Last BM: 03/14/23    Intake/Output Summary (Last 24 hours) at 3/15/2023 1128  Last data filed at 3/14/2023 1323  Gross per 24 hour   Intake 120 ml   Output 200 ml   Net -80 ml     I/O last 3 completed shifts: In: 800.2 [P.O.:480; I.V.:320.2]  Out: 200 [Urine:200]    Safety Concerns: At Risk for Falls    Impairments/Disabilities:      Speech    Nutrition Therapy:  Current Nutrition Therapy:   - Oral Diet:  Dysphagia - Minced and Moist    Routes of Feeding: Oral  Liquids: Honey Thick Liquids  Daily Fluid Restriction: no  Last Modified Barium Swallow with Video (Video Swallowing Test): done on 03/15/23/     Treatments at the Time of Hospital Discharge:   Respiratory Treatments: N/A  Oxygen Therapy:  is not on home oxygen therapy.   Ventilator:    - No ventilator support    Rehab Therapies: Physical Therapy Occupational Therapy  Weight Bearing Status/Restrictions: No weight bearing restrictions  Other Medical Equipment (for information only, NOT a DME order):  Lift  Other Treatments: N/A    Patient's personal belongings (please select all that are sent with patient):  Dentures upper    RN SIGNATURE:  Electronically signed by Mahesh Umana RN on 3/15/23 at 2:26 PM EDT    CASE MANAGEMENT/SOCIAL WORK SECTION    Inpatient Status Date: 3/12/23    Readmission Risk Assessment Score:  Readmission Risk              Risk of Unplanned Readmission:  14           Discharging to Facility/ Agency   [facility] [address] [place]     / signature: Electronically signed by Jose Lopez RN on 3/15/23 at 2:06 PM EDT    PHYSICIAN SECTION    Prognosis: Fair    Condition at Discharge: Stable    Rehab Potential (if transferring to Rehab): Fair    Recommended Labs or Other Treatments After Discharge:   Saud Sheikh in 1 week    Physician Certification: I certify the above information and transfer of Benedict Restrepo  is necessary for the continuing treatment of the diagnosis listed and that she requires [facility] for greater 30 days.      Update Admission H&P: No change in H&P    PHYSICIAN SIGNATURE:  Electronically signed by Tatum Foster MD on 3/15/23 at 11:28 AM EDT

## 2023-03-15 NOTE — DISCHARGE SUMMARY
Hospital Discharge Summary    Patient's PCP: Mery Reinoso MD  Admit Date: 3/12/2023   Discharge Date: 3/15/2023    Admitting Physician: Dr. Ely Castro MD  Discharge Physician: Dr. Carlo Alejo MD   Consults: GI    Brief HPI:     80 y.o. female with a PMHx of HTN, who presented to the [facility] ED for evaluation of      Pt brought in by son today for increased lethargy and altered mental status. Pt notes increased fatigue today with episode of diarrhea yesterday along with urinary incontinence, which is unusual for her. Her PO intake has been poor. She denies abdominal pain. Took 2 tabs of Tylenol for pain to the neck, feels she pulled a muscle getting into bed several days ago. She does have a hx of chronic upper extremity resting tremor and difficulty with extension of the elbows and fingers. In the ER, mildly hypertensive and febrile to 100.7 °F.  Labs show sodium 131, elevated transaminases ALT//395 respectively, alk phos 573, and bili 1.7. INR 1.32. Acetaminophen level is negative. Troponin mildly elevated 0.07. No leukocytosis, lactic WNL. UA w/ mod blood, without evidence of infection. Brief hospital course:     Treated for the following    -Acute metabolic encephalopathy dt infection. .  resolved     -E. coli bacteremia with sepsis--presumed GI source--treated with IV Rocephin, dc'd on cefnidir x 7 days--     -Transaminitis--improved-likely dt infection vs DILI--acute hepatitis panel negative-liver and biliary ducts normal on CT although not completely visualized --- Tylenol level not elevated---  GI consult appreciated--     -presumed Viral illness . fever/cough/report of diarrhea prior to admission--clinically improved.   COVID and influenza negative     -Pancytopenia,acute--suspect due to infection--continue to monitor     -Physical deconditioning--continue PT and OT     -Dehydration-improved with IV fluids     -Parkinson's disease--continue Sinemet     -GERD--continue PPI -AAA 3.7 cm-incidental finding on CT abdomen-follow outpatient with routine imaging every 2 years    -Oropharyngeal dysphagia--on modified diet per speech    -Underweight with a BMI of 17.4--nutritional supplementation       Invasive procedures:  improved    Discharge Diagnoses:   Principal Problem:    Acute metabolic encephalopathy  Resolved Problems:    * No resolved hospital problems. *      Physical Exam: /85   Pulse 58   Temp 98.8 °F (37.1 °C) (Oral)   Resp 18   Ht 5' 7\" (1.702 m)   Wt 111 lb 8.8 oz (50.6 kg)   SpO2 95%   BMI 17.47 kg/m²   Gen/overall appearance: Not in acute distress. Alert. Head: Normocephalic, atraumatic  Eyes: EOMI, good acuity  ENT:- Oral mucosa moist  Neck: No JVD, thyromegaly  CVS: Nml S1S2, no MRG, RRR  Pulm: Clear bilaterally. No crackles/wheezes  Gastrointestinal: Soft, NT/ND, +BS  Musculoskeletal: No edema. Warm  Neuro: No focal deficit. Moves extremity spontaneously. Psychiatry: Appropriate affect. Not agitated. Skin: Warm, dry with normal turgor. No rash        Significant Diagnostic Studies:    See above      Treatments: As above. Discharge Medications:     Medication List        START taking these medications      cefdinir 300 MG capsule  Commonly known as: OMNICEF  Take 1 capsule by mouth 2 times daily for 7 days  Start taking on: March 16, 2023            CHANGE how you take these medications      carbidopa-levodopa  MG per tablet  Commonly known as: SINEMET  TAKE 1 TABLET BY MOUTH THREE TIMES A DAY  What changed: See the new instructions.             CONTINUE taking these medications      acetaminophen 500 MG tablet  Commonly known as: TYLENOL     albuterol sulfate  (90 Base) MCG/ACT inhaler  Commonly known as: PROVENTIL;VENTOLIN;PROAIR  Inhale 2 puffs into the lungs every 6 hours as needed for Wheezing or Shortness of Breath     Lift Chair Misc  by Does not apply route     pantoprazole 40 MG tablet  Commonly known as: PROTONIX  Take 1 tablet by mouth every morning (before breakfast)     SENNA CO            STOP taking these medications      Calcium Carb-Cholecalciferol 500-5 MG-MCG Tabs  Commonly known as: Oyster Shell Calcium Plus D     meloxicam 15 MG tablet  Commonly known as: Mobic               Where to Get Your Medications        Information about where to get these medications is not yet available    Ask your nurse or doctor about these medications  cefdinir 300 MG capsule         Activity: activity as tolerated  Diet: ADULT DIET; Dysphagia - Minced and Moist; Moderately Thick (Honey)  ADULT ORAL NUTRITION SUPPLEMENT; Breakfast, Lunch, Dinner; Standard High Calorie/High Protein Oral Supplement      Disposition: SNF  Discharged Condition: Stable  Follow Up:   No follow-up provider specified. Code status:  Full Code         Total time spent on discharge, finalizing medications, referrals and arranging outpatient follow up was more than 45 minutes      Thank you Dr. Lucero Alvarado MD for the opportunity to be involved in this patients care.

## 2023-03-15 NOTE — PLAN OF CARE
Bedside swallow evaluation completed this date.     Libby Mauro M.S. [facility]  Speech-language pathologist  [id number]

## 2023-03-15 NOTE — PLAN OF CARE
Problem: Discharge Planning  Goal: Discharge to home or other facility with appropriate resources  Outcome: Progressing  Flowsheets (Taken 3/15/2023 0916)  Discharge to home or other facility with appropriate resources:   Identify barriers to discharge with patient and caregiver   Arrange for needed discharge resources and transportation as appropriate   Identify discharge learning needs (meds, wound care, etc)     Problem: Skin/Tissue Integrity  Goal: Absence of new skin breakdown  Description: 1. Monitor for areas of redness and/or skin breakdown  2. Assess vascular access sites hourly  3. Every 4-6 hours minimum:  Change oxygen saturation probe site  4. Every 4-6 hours:  If on nasal continuous positive airway pressure, respiratory therapy assess nares and determine need for appliance change or resting period.   Outcome: Progressing     Problem: Safety - Adult  Goal: Free from fall injury  Outcome: Progressing     Problem: Skin/Tissue Integrity - Adult  Goal: Skin integrity remains intact  Outcome: Progressing  Flowsheets (Taken 3/15/2023 0916)  Skin Integrity Remains Intact: Monitor for areas of redness and/or skin breakdown     Problem: Musculoskeletal - Adult  Goal: Return mobility to safest level of function  Outcome: Progressing  Flowsheets (Taken 3/15/2023 0916)  Return Mobility to Safest Level of Function: Assess patient stability and activity tolerance for standing, transferring and ambulating with or without assistive devices     Problem: Infection - Adult  Goal: Absence of infection at discharge  Outcome: Progressing  Flowsheets (Taken 3/15/2023 0916)  Absence of infection at discharge: Assess and monitor for signs and symptoms of infection

## 2023-03-15 NOTE — PROGRESS NOTES
Speech Language Pathology  MBSS    MBSS completed -- silent aspiration with thin and mildly thick (nectar) liquids during study. Pt unable to successfully clear aspirated material despite cued cough. Recommend Minced and moist diet (IDDSI 5) with moderately thick (honey) thick liquids (IDDSI 3), meds whole or crushed in puree. Full report to follow.     Nimesh Nava M.S. [id number] [facility]  Speech-language pathologist  [id number]

## 2023-03-15 NOTE — PROGRESS NOTES
Speech Language Pathology    Speech Language Pathology  Clinical Bedside Swallow Assessment  Facility/Department: [facility] B3 - MED SURG      Recommendations:  Diet recommendation: IDDSI 5 Minced and moist Solids; IDDSI 0 Thin Liquids; Meds whole or crushed in puree  Instrumentation: MBSS recommended to correlate clinical findings. SLP sent Perfect Serve message to MD to request order for MBSS. RN aware of recs. Risk management: upright for all intake, stay upright for at least 30 mins after intake, small bites/sips, assist feed, 1:1 supervision with intake, oral care q4 hrs to reduce adverse affects in the event of aspiration, alternate bites/sips, slow rate of intake, general GERD precautions, general aspiration precautions, and hold PO and contact SLP if s/s of aspiration or worsening respiratory status develop. Harinder Spann  : 1935 (80 y.o.)   MRN: [id number]  ROOM: [address] [place] [address]-  ADMISSION DATE: 3/12/2023  PATIENT DIAGNOSIS(ES): Septicemia ([facility] [address] [A41.9]  Transaminitis [R74.01]  Elevated troponin [R77.8]  Urinary tract infection with hematuria, site unspecified [N39.0, I78.5]  Acute metabolic encephalopathy [J84.63]  Pneumonia due to infectious organism, unspecified laterality, unspecified part of lung [J18.9]  Chief Complaint   Patient presents with    Altered Mental Status     From home, hx of parkinson, possibly dehydrated. Altered per family.   Continues to ask for water appears very dry, very poor skin turgor         Patient Active Problem List    Diagnosis Date Noted    Acute metabolic encephalopathy     Numbness 2022    Polyneuropathy 2022    Demyelinating disease ([facility] [address] 2022    MS (multiple sclerosis) ([facility] [address] 2022    History of rectal bleeding 06/10/2021    Sensorineural hearing loss, bilateral 2021    Iron deficiency anemia 2021    Parkinsonian tremor ([facility] [address] 10/06/2020    HTN (hypertension), benign 10/06/2020    BRBPR (bright red blood per rectum)     Acute blood loss anemia     Parkinson disease, symptomatic (HCC)     NSAID long-term use     Acute pain of right shoulder     Blood per rectum 06/27/2020    Moderate malnutrition (HCC) 03/11/2020    Status post total right knee replacement 03/10/2020    S/P total knee arthroplasty, right 03/10/2020    Weakness generalized 05/30/2019    Environmental and seasonal allergies 04/03/2019    Irritable bowel syndrome with constipation 08/29/2018    Primary osteoarthritis of both knees 03/31/2017    B12 deficiency 10/28/2014    History of nonadherence to medical treatment 10/11/2014    Incomplete tear of right rotator cuff 10/10/2014    Biceps rupture, proximal 07/21/2014    Staghorn calculus 08/30/2013    Vitamin D deficiency 07/24/2013    Carpal tunnel syndrome of left wrist 10/06/2011    Mild intermittent asthma 10/06/2011    Goiter 10/06/2011     Past Medical History:   Diagnosis Date    Anxiety 7/20/2012    Arthritis     Asthma     Carpal tunnel syndrome of left wrist 10/6/2011    Depression 5/2/2012    GERD (gastroesophageal reflux disease) 9/4/2013    Hematuria     chronic from stone    History of nonadherence to medical treatment 10/11/2014    Hypertension     Kidney stone     saw Dr Jeni Pimentel - 6 cm stone    Thyroid disease     goiter    Tight ring on finger     unable to remove - left hand    Wears dentures     full upper    Wears glasses     for reading     Past Surgical History:   Procedure Laterality Date    CARPAL TUNNEL RELEASE Right 09/14/2011    OPEN REDUCTION INTERNAL FIXATION RIGHT WRIST (RUSSO'S FRACTURE)    COLONOSCOPY N/A 7/1/2020    [facility] (2:00) [facility] (CERVIX STATUS UNKNOWN)      SIGMOIDOSCOPY N/A 6/30/2020    FLEX SIG W/ANES.  performed by Katia Hernández DO at [facility] Right 3/10/2020    RIGHT TOTAL KNEE REPLACEMENT       KANG & NEPHEW performed by Pro Armas MD at [address] [facility] OR    UPPER GASTROINTESTINAL ENDOSCOPY N/A 6/30/2020    EGD BIOPSY performed by Lashay Veras DO at [facility]  6/30/2020    EGD DILATION BALLOON performed by Lashay Veras DO at [facility]   Allergen Reactions    Hydrochlorothiazide      Constipation and severe leg cramps       DATE ONSET: Pt admitted to [facility] on 3/12/23    Date of Evaluation: 3/15/2023   Evaluating Therapist: AJITH Paniagua    Chart Reviewed: : [x] Yes [] No    Current Diet: Diet NPO    Recent Chest Radiography: [x] Chest XR   [] CT of Chest  Date: 3/14/23  Impressions  Impression   Mild right lower lobe infiltrate. COPD changes. Gaseous distention of the bowel in the upper abdomen, incompletely imaged. Superior mediastinal goiter. Pain: no c/o pain    Reason for Referral  Andrés Wallis was referred for a bedside swallow evaluation to assess the efficiency of their swallow function, identify signs and symptoms of aspiration and make recommendations regarding safe dietary consistencies, effective compensatory strategies, and safe eating environment. Assessment    Medical record review/interview: Per MD H&P: \"Danae Almeida is a 80 y.o. female with a PMHx of HTN, who presented to the [address] [facility] ED for evaluation of      Pt brought in by son today for increased lethargy and altered mental status. Pt notes increased fatigue today with episode of diarrhea yesterday along with urinary incontinence, which is unusual for her. Her PO intake has been poor. She denies abdominal pain. Took 2 tabs of Tylenol for pain to the neck, feels she pulled a muscle getting into bed several days ago. She does have a hx of chronic upper extremity resting tremor and difficulty with extension of the elbows and fingers.        In the ER, mildly hypertensive and febrile to 100.7 °F.  Labs show sodium 131, elevated transaminases ALT//395 respectively, alk phos 573, and bili 1.7. INR 1.32. Acetaminophen level is negative. Troponin mildly elevated 0.07. No leukocytosis, lactic WNL. UA w/ mod blood, without evidence of infection. Case discussed with ED BRIAN for admission. External medical records reviewed\". Predisposing dysphagia risk factors: Parkinson's Disease and Age  Clinical signs of possible chronic dysphagia: N/A  Precipitating dysphagia risk factors: reduced physical mobility, increased O2 demands, and suspected disuse atrophy      Patient Complaints:  Odynophagia: [] Yes [x] No  Globus Sensation: [x] Yes [] No  SOB with PO intake: [] Yes [x] No  Increased WOB with PO intake: [] Yes [x] No  Reflux Sx's: [] Yes [x] No  Weight loss: [] Yes [x] No  Coughing/Choking with PO intake: [] Yes [x] No  Reduced Appetite: [] Yes [x] No    Additional Reported Symptoms/Complaints/Hospital Course: Pt admitted d/t PNA, UTI, and septicemia. Pt has PMHx of Parkinson's disease and GERD. RN reported \"choking event\" yesterday and pt was downgraded to NPO pending completion of BSE. No hx of dysphagia per chart review, pt denied dysphagia.      Vitals/labs:   Temp: n/a  SpO2: 93-95%  RR: 24-27/min  BP: 148/85  HR: 62  O2 device: 1 lpm O2 NC    CBC:   Recent Labs     03/15/23  0603   WBC 2.2*   HGB 10.1*   PLT 77*      BMP:  Recent Labs     03/15/23  0603   *   K 3.6      CO2 25   BUN 16   CREATININE 0.6   GLUCOSE 73          Cranial nerve exam:   CN V (trigeminal): ophthalmic, maxillary, and mandibular facial sensation- WFL  CN VII (facial): Reduced  CN IX/X (glossopharyngeal/vagus): MPT: DNT; pitch range: DNT; vocal quality: weak; cough: Weak- perceptually, Non-Productive, and Dry  CN XII (hypoglossal): Reduced and Lingual fasciculations upon protrusion    Laryngeal function exam:   Secretions: n/a  Vocal quality: See CN exam above  MPT: See CN exam above  S/Z ratio: DNT  Pitch range: See CN exam above  Cough: See CN exam above    Oral Care Status: [] Oral Care [facility]  [] Poor oral care status  [] Edentulous  [x] Upper Dentures (*ill fitting)  [] Lower Dentures  [] Missing/Broken Teeth  [] Evidence of dental cavities/carries    PO trials:   IDDSI 0 (thin): ice chip x2, cup and straw sips. Grossly timely swallow initiation. Change in vocal quality post-swallow with TL x2, however, not wet in quality. No change in O2 sats, RR, coughing, or throat clearing throughout. IDDSI 4 (puree): adequate A-P bolus transit and oral clearance. No clinical s/s of aspiration. Pt did endorse globus sensation post-swallow with puree in 2/2 trials, successfully cleared with cued double swallow and liquid wash. IDDSI 7 (regular): mildly reduced A-P bolus transit (again, upper dentures appear ill-fitting which suspect may negatively impact mastication), no significant oral/lingual residue post-swallow. No clinical s/s of aspiration. Pt denied globus sensation post-swallow. 3 oz water: DNT    Impressions:  Pt oriented x4, seen upright in bed and alert and agreeable to evaluation. Pt denied dysphagia at baseline although reported coughing with liquids yesterday (pt currently NPO pending BSE). Noted ill-fitting upper dentures, pt requested to leave in place for PO. No clinical s/s of aspiration observed with PO trials during BSE. Pt did endorse globus sensation post-swallow with puree in 2/2 trials, successfully cleared with cued double swallow and liquid wash. She required assistance with PO from SLP. Instrumental swallow study via MBSS to correlate clinical findings is recommended. A minced and moist diet with thin liquids, meds whole or crushed in puree recommended pending MBSS results/recs. RN notified of recs. SLP sent Perfect Serve message to MD to request order for MBSS. Recommendations:  Diet recommendation: IDDSI 5 Minced and moist Solids; IDDSI 0 Thin Liquids;  Meds whole or crushed in puree  Instrumentation: MBSS recommended to correlate clinical findings. SLP sent Perfect Serve message to MD to request order for MBSS. RN aware of recs. Risk management: upright for all intake, stay upright for at least 30 mins after intake, small bites/sips, assist feed, 1:1 supervision with intake, oral care q4 hrs to reduce adverse affects in the event of aspiration, alternate bites/sips, slow rate of intake, general GERD precautions, general aspiration precautions, and hold PO and contact SLP if s/s of aspiration or worsening respiratory status develop.     Prognosis: Fair - Good    Recommended Intervention:   [x] Dysphagia tx  [] Videostroboscopy                      [] NPO   [x] MBS       [] Speech/Cog Eval    [] Therapeutic PO Trials     [] Ice Chips   [] Other:  [] FEES                                                 Dysphagia Therapeutic Intervention:   []  Bolus control Exercises  []  Oral Motor Exercises  []  [employer] Protocol  []  Thermal Stimulation  [x]  Oral Care    []  Vital Stim/NMES  []  Laryngeal Exercises  [x]  Patient/Family Education  []  Pharyngeal Exercises  []  Therapeutic PO trials with SLP  [x]  Diet tolerance monitoring  []  Other:     Referrals:  [] ENT    [] PT  [] Pulmonology [] GI  [] Neurology  [] RD  [] OT   []     Goals:  Short Term Goals:  Timeframe for Short Term Goals: (5 days, 3/20/23)  Goal 1: The patient will tolerate recommended diet with no clinical s/s of aspiration 5/5  Goal 2: The patient/caregiver will demonstrate understanding of compensatory swallow strategies, for improved swallow safety  Goal 3: The patient will tolerate instrumental assessment when able     Long Term Goals:   Timeframe for Long Term Goals: (7 days, 3/22/23)  Goal 1: The patient will tolerate least restrictive diet with no clinical s/s of aspiration or worsening respiratory/pulmonary status      Treatment:  Skilled instruction completed with patient re: evidenced based practice regarding recommendations and POC, importance of oral care to reduce adverse affects in the event of aspiration, and instruction of recommended compensatory strategies developed based upon clinical exam. Pt able to recall/demonstrate compensatory strategies with min cues. Pt Education: SLP educated the patient re: Role of SLP, rationale for completion of assessment, results of assessment, recommendations, POC, and rationale for MBS  Pt Education Response: verbalized understanding, would benefit from ongoing education, and RN aware    Duration/Frequency of Tx: TBD pending MBSS results/recs    Individuals Consulted:   [x]  Patient     []  NP         [x]  RN   []  RD                   []  MD      []  Family Member                        []  PA    []  Other:      Safety Devices / Report:  [x]  All fall risk precautions in place [x]  Safety handoff completed with RN  [x]  Bed alarm in place  [x]  Left in bed     []  Chair alarm in place  []  Left in chair   [x]  Call light in reach   []  Other:          Total Treatment Time / Charges       Time in Time out Total Time / units   Swallow Eval/Tx Time  0812 0835 23 min / 2 units (DE/DT)       Signature:  BRUCE PerkinsSDiego [address] [facility]  Speech-language pathologist  [id number]

## 2023-03-16 LAB — BACTERIA BLD CULT ORG #2: NORMAL

## 2023-04-18 ENCOUNTER — TELEPHONE (OUTPATIENT)
Dept: INTERNAL MEDICINE CLINIC | Age: 88
End: 2023-04-18

## 2023-04-18 NOTE — TELEPHONE ENCOUNTER
Spoke with azeb Montoya to set up for virtual visit today, starting now. They forgot about appointment today. Trevor Montoya states that patient is in the process of transferring to a long term care facility, and they may not reschedule at this time.

## 2024-02-27 ENCOUNTER — OFFICE VISIT (OUTPATIENT)
Dept: NEUROLOGY | Age: 89
End: 2024-02-27
Payer: COMMERCIAL

## 2024-02-27 VITALS — WEIGHT: 115 LBS | HEIGHT: 67 IN | BODY MASS INDEX: 18.05 KG/M2

## 2024-02-27 DIAGNOSIS — G20.A1 PARKINSON DISEASE, SYMPTOMATIC: Primary | ICD-10-CM

## 2024-02-27 DIAGNOSIS — I10 HTN (HYPERTENSION), BENIGN: ICD-10-CM

## 2024-02-27 DIAGNOSIS — E44.0 MODERATE MALNUTRITION (HCC): ICD-10-CM

## 2024-02-27 DIAGNOSIS — G20.A1 PARKINSON'S DISEASE WITHOUT DYSKINESIA OR FLUCTUATING MANIFESTATIONS: ICD-10-CM

## 2024-02-27 PROCEDURE — 99213 OFFICE O/P EST LOW 20 MIN: CPT | Performed by: PSYCHIATRY & NEUROLOGY

## 2024-02-27 PROCEDURE — 1123F ACP DISCUSS/DSCN MKR DOCD: CPT | Performed by: PSYCHIATRY & NEUROLOGY

## 2024-02-27 RX ORDER — FERROUS SULFATE 325(65) MG
325 TABLET ORAL
COMMUNITY

## 2024-02-27 NOTE — PROGRESS NOTES
The patient came today for follow up regarding: Parkinsonian tremors and Parkinson disease      The patient came today with her caregiver.  No change since last visit.  She is on Sinemet  mg, 3 times daily.  Continues to need assistant in most of her ADLs.  She is nonambulatory.  Gets around with a wheelchair.  Still the same rigidity and mild tremors.  She denies any hallucination at night.  No recent fall or injury.  Weight is about the same.  No significant insomnia or parasomnia according to nursing home staff.  No recent fever or chills.  Other review of system was unremarkable    Exam:   Constitutional:   Vitals:    02/27/24 1127   Weight: 52.2 kg (115 lb)   Height: 1.702 m (5' 7.01\")       General appearance:  NAD. No chnages  Mental Status: Malnourished  Oriented to person, place, problem, and time.    Memory: Impaired immediate recall.  Intact remote memory  Normal attention span and concentration.  Language: intact naming, repeating and fluency but spastic speech  Good fund of Knowledge. Aware of current events and vocabulary   Cranial Nerves:   II: Pupils: equal, round, reactive to light  III,IV,VI: Extra Ocular Movements are intact. No nystagmus  V: Facial sensation is intact   VII: Facial strength and movements: intact and symmetric  XII: Tongue movements are normal  Musculoskeletal: Decreased rang of motion of both shoulder more right than left and decreased range of motion her neck muscles bilaterally.  Resting tremors with pill-rolling tremors, cogwheeling rigidity in both arms.  Poor REM.  No changes today  No sensory loss  Gait: Nonambulatory      ROS : A 10-14 system review of constitutional, cardiovascular, respiratory, GI, eyes, , ENT, musculoskeletal, endocrine, hematological, skin, SHEENT, genitourinary, psychiatric and neurologic systems was obtained and updated today which is unremarkable except as mentioned in my HPI      Medical decision making:  I personally reviewed and updated

## (undated) DEVICE — ELECTRODE ECG MONITR FOAM TEAR DROP ADLT RED

## (undated) DEVICE — PENCIL SMK EVAC ALL IN 1 DSGN ENH VISIBILITY IMPROVED AIR

## (undated) DEVICE — CONMED SCOPE SAVER BITE BLOCK, 20X27 MM: Brand: SCOPE SAVER

## (undated) DEVICE — STERILE LATEX POWDER-FREE SURGICAL GLOVESWITH NITRILE COATING: Brand: PROTEXIS

## (undated) DEVICE — SMARTGOWN SURGICAL GOWN, LARGE: Brand: CONVERTORS

## (undated) DEVICE — FORCEP BX STD CAP 240CM RAD JAW 4

## (undated) DEVICE — Device

## (undated) DEVICE — RIMMED SPEED PIN 45MM STERILE

## (undated) DEVICE — SUTURE MCRYL + SZ 4-0 L18IN ABSRB UD L19MM PS-2 3/8 CIR MCP496G

## (undated) DEVICE — SUTURE VCRL + SZ 2-0 L18IN ABSRB UD CT1 L36MM 1/2 CIR VCP839D

## (undated) DEVICE — DRILL BIT 3.2MM (1/8'') X 128.0MM

## (undated) DEVICE — OPTIFOAM GENTLE SA, POSTOP, 4X12: Brand: MEDLINE

## (undated) DEVICE — SMARTGOWN SURGICAL GOWN, XL: Brand: CONVERTORS

## (undated) DEVICE — SOLUTION IRRIG 2000ML 0.9% SOD CHL USP UROMATIC PLAS CONT

## (undated) DEVICE — SPONGE LAP W18XL18IN WHT COT 4 PLY FLD STRUNG RADPQ DISP ST

## (undated) DEVICE — 3 BONE CEMENT MIXER: Brand: MIXEVAC

## (undated) DEVICE — 3M™ COBAN™ SELF-ADHERENT WRAP, 1586S, STERILE, 6 IN X 5 YD (15 CM X 4,5 M), 12 ROLLS/CASE: Brand: 3M™ COBAN™

## (undated) DEVICE — BLADE SURG SAW SAG S STL AGG 905MM LEN 185MM W 127MM THCK

## (undated) DEVICE — Z CONVERTED USE 2271377 BANDAGE COMPR W6INXL5YD EC E REB

## (undated) DEVICE — ENDO CARRY-ON PROCEDURE KIT INCLUDES SUCTION TUBING, LUBRICANT, GAUZE, BIOHAZARD STICKER, TRANSPORT PAD AND INTERCEPT BEDSIDE KIT.: Brand: ENDO CARRY-ON PROCEDURE KIT

## (undated) DEVICE — CANNULA,OXY,ADULT,SUPERSOFT,W/7'TUB,SC: Brand: MEDLINE INDUSTRIES, INC.

## (undated) DEVICE — COVER,TABLE,HEAVY DUTY,50"X90",STRL: Brand: MEDLINE

## (undated) DEVICE — SMARTGOWN BREATHABLE SURGICAL GOWN: Brand: CONVERTORS

## (undated) DEVICE — HOOD, PEEL-AWAY: Brand: FLYTE

## (undated) DEVICE — STERILE POLYISOPRENE POWDER-FREE SURGICAL GLOVES: Brand: PROTEXIS

## (undated) DEVICE — SUTURE SURGLON SZ 1 L18IN NONABSORBABLE BLK GS 21 L37MM 1 2 8886196272

## (undated) DEVICE — NEEDLE HYPO 20GA L1.5IN YEL POLYPR HUB S STL REG BVL STR

## (undated) DEVICE — SPLINT KNEE UNIV FOR LESS THAN 36IN L20IN FOAM LAM E CNTCT

## (undated) DEVICE — SYSTEM SKIN CLSR 22CM DERMBND PRINEO

## (undated) DEVICE — TUBE IRRIG HNDPC HI FLO TP INTRPULS W/SUCTION TUBE

## (undated) DEVICE — ESOPHAGEAL/PYLORIC/COLONIC/BILIARY WIREGUIDED BALLOON DILATATION CATHETER: Brand: CRE™ PRO

## (undated) DEVICE — HANDPIECE SET WITH HIGH FLOW TIP AND SUCTION TUBE: Brand: INTERPULSE

## (undated) DEVICE — GENESIS TROCHLEAR PIN 1/8 X 3: Brand: GENESIS